# Patient Record
Sex: FEMALE | Race: WHITE | Employment: OTHER | URBAN - METROPOLITAN AREA
[De-identification: names, ages, dates, MRNs, and addresses within clinical notes are randomized per-mention and may not be internally consistent; named-entity substitution may affect disease eponyms.]

---

## 2017-04-25 PROBLEM — D31.40: Status: ACTIVE | Noted: 2017-04-25

## 2017-06-16 ENCOUNTER — APPOINTMENT (OUTPATIENT)
Dept: GENERAL RADIOLOGY | Age: 57
End: 2017-06-16
Attending: EMERGENCY MEDICINE
Payer: COMMERCIAL

## 2017-06-16 ENCOUNTER — TELEPHONE (OUTPATIENT)
Dept: FAMILY MEDICINE CLINIC | Age: 57
End: 2017-06-16

## 2017-06-16 ENCOUNTER — HOSPITAL ENCOUNTER (EMERGENCY)
Age: 57
Discharge: HOME | End: 2017-06-16
Attending: EMERGENCY MEDICINE | Admitting: EMERGENCY MEDICINE
Payer: COMMERCIAL

## 2017-06-16 VITALS
RESPIRATION RATE: 13 BRPM | HEIGHT: 70 IN | OXYGEN SATURATION: 98 % | SYSTOLIC BLOOD PRESSURE: 120 MMHG | TEMPERATURE: 98.5 F | HEART RATE: 71 BPM | DIASTOLIC BLOOD PRESSURE: 73 MMHG | WEIGHT: 165 LBS | BODY MASS INDEX: 23.62 KG/M2

## 2017-06-16 DIAGNOSIS — R00.2 PALPITATIONS: Primary | ICD-10-CM

## 2017-06-16 DIAGNOSIS — R06.09 DOE (DYSPNEA ON EXERTION): ICD-10-CM

## 2017-06-16 LAB
ALBUMIN SERPL BCP-MCNC: 3.9 G/DL (ref 3.5–5)
ALBUMIN/GLOB SERPL: 1.1 {RATIO} (ref 1.1–2.2)
ALP SERPL-CCNC: 92 U/L (ref 45–117)
ALT SERPL-CCNC: 38 U/L (ref 12–78)
ANION GAP BLD CALC-SCNC: 6 MMOL/L (ref 5–15)
AST SERPL W P-5'-P-CCNC: 23 U/L (ref 15–37)
ATRIAL RATE: 83 BPM
ATTENDING PHYSICIAN, CST07: NORMAL
BASOPHILS # BLD AUTO: 0 K/UL (ref 0–0.1)
BASOPHILS # BLD: 1 % (ref 0–1)
BILIRUB SERPL-MCNC: 0.4 MG/DL (ref 0.2–1)
BUN SERPL-MCNC: 15 MG/DL (ref 6–20)
BUN/CREAT SERPL: 16 (ref 12–20)
CALCIUM SERPL-MCNC: 8.8 MG/DL (ref 8.5–10.1)
CALCULATED P AXIS, ECG09: 85 DEGREES
CALCULATED R AXIS, ECG10: 86 DEGREES
CALCULATED T AXIS, ECG11: 65 DEGREES
CHLORIDE SERPL-SCNC: 106 MMOL/L (ref 97–108)
CK SERPL-CCNC: 174 U/L (ref 26–192)
CO2 SERPL-SCNC: 29 MMOL/L (ref 21–32)
CREAT SERPL-MCNC: 0.94 MG/DL (ref 0.55–1.02)
D DIMER PPP FEU-MCNC: 0.23 MG/L FEU (ref 0–0.65)
DIAGNOSIS, 93000: NORMAL
DIAGNOSIS, 93000: NORMAL
DUKE TM SCORE RESULT, CST14: NORMAL
DUKE TREADMILL SCORE, CST13: NORMAL
ECG INTERP BEFORE EX, CST11: NORMAL
ECG INTERP DURING EX, CST12: NORMAL
EOSINOPHIL # BLD: 0.1 K/UL (ref 0–0.4)
EOSINOPHIL NFR BLD: 1 % (ref 0–7)
ERYTHROCYTE [DISTWIDTH] IN BLOOD BY AUTOMATED COUNT: 12.7 % (ref 11.5–14.5)
FUNCTIONAL CAPACITY, CST17: NORMAL
GLOBULIN SER CALC-MCNC: 3.6 G/DL (ref 2–4)
GLUCOSE SERPL-MCNC: 103 MG/DL (ref 65–100)
HCT VFR BLD AUTO: 43.4 % (ref 35–47)
HGB BLD-MCNC: 14 G/DL (ref 11.5–16)
KNOWN CARDIAC CONDITION, CST08: NORMAL
LYMPHOCYTES # BLD AUTO: 29 % (ref 12–49)
LYMPHOCYTES # BLD: 1.4 K/UL (ref 0.8–3.5)
MAGNESIUM SERPL-MCNC: 2.3 MG/DL (ref 1.6–2.4)
MAX. DIASTOLIC BP, CST04: 80 MMHG
MAX. HEART RATE, CST05: 160 BPM
MAX. SYSTOLIC BP, CST03: 170 MMHG
MCH RBC QN AUTO: 29.5 PG (ref 26–34)
MCHC RBC AUTO-ENTMCNC: 32.3 G/DL (ref 30–36.5)
MCV RBC AUTO: 91.6 FL (ref 80–99)
MONOCYTES # BLD: 0.4 K/UL (ref 0–1)
MONOCYTES NFR BLD AUTO: 8 % (ref 5–13)
NEUTS SEG # BLD: 3 K/UL (ref 1.8–8)
NEUTS SEG NFR BLD AUTO: 61 % (ref 32–75)
OVERALL BP RESPONSE TO EXERCISE, CST16: NORMAL
OVERALL HR RESPONSE TO EXERCISE, CST15: NORMAL
P-R INTERVAL, ECG05: 124 MS
PEAK EX METS, CST10: 10.9 METS
PLATELET # BLD AUTO: 212 K/UL (ref 150–400)
POTASSIUM SERPL-SCNC: 3.9 MMOL/L (ref 3.5–5.1)
PROT SERPL-MCNC: 7.5 G/DL (ref 6.4–8.2)
PROTOCOL NAME, CST01: NORMAL
Q-T INTERVAL, ECG07: 370 MS
QRS DURATION, ECG06: 94 MS
QTC CALCULATION (BEZET), ECG08: 434 MS
RBC # BLD AUTO: 4.74 M/UL (ref 3.8–5.2)
SODIUM SERPL-SCNC: 141 MMOL/L (ref 136–145)
TEST INDICATION, CST09: NORMAL
TROPONIN I BLD-MCNC: <0.04 NG/ML (ref 0–0.08)
TROPONIN I SERPL-MCNC: <0.04 NG/ML
TSH SERPL DL<=0.05 MIU/L-ACNC: 1.47 UIU/ML (ref 0.36–3.74)
VENTRICULAR RATE, ECG03: 83 BPM
WBC # BLD AUTO: 4.9 K/UL (ref 3.6–11)

## 2017-06-16 PROCEDURE — 80053 COMPREHEN METABOLIC PANEL: CPT | Performed by: EMERGENCY MEDICINE

## 2017-06-16 PROCEDURE — 93005 ELECTROCARDIOGRAM TRACING: CPT

## 2017-06-16 PROCEDURE — 84443 ASSAY THYROID STIM HORMONE: CPT | Performed by: EMERGENCY MEDICINE

## 2017-06-16 PROCEDURE — 84484 ASSAY OF TROPONIN QUANT: CPT | Performed by: EMERGENCY MEDICINE

## 2017-06-16 PROCEDURE — 71020 XR CHEST PA LAT: CPT

## 2017-06-16 PROCEDURE — 93351 STRESS TTE COMPLETE: CPT

## 2017-06-16 PROCEDURE — 82550 ASSAY OF CK (CPK): CPT | Performed by: EMERGENCY MEDICINE

## 2017-06-16 PROCEDURE — 99285 EMERGENCY DEPT VISIT HI MDM: CPT

## 2017-06-16 PROCEDURE — 85379 FIBRIN DEGRADATION QUANT: CPT | Performed by: EMERGENCY MEDICINE

## 2017-06-16 PROCEDURE — 83735 ASSAY OF MAGNESIUM: CPT | Performed by: EMERGENCY MEDICINE

## 2017-06-16 PROCEDURE — 85025 COMPLETE CBC W/AUTO DIFF WBC: CPT | Performed by: EMERGENCY MEDICINE

## 2017-06-16 PROCEDURE — 36415 COLL VENOUS BLD VENIPUNCTURE: CPT | Performed by: EMERGENCY MEDICINE

## 2017-06-16 NOTE — DISCHARGE INSTRUCTIONS
Palpitations: Care Instructions  Your Care Instructions    Heart palpitations are the uncomfortable sensation that your heart is beating fast or irregularly. You might feel pounding or fluttering in your chest. It might feel like your heart is skipping a beat. Although palpitations may be caused by a heart problem, they also occur because of stress, fatigue, or use of alcohol, caffeine, or nicotine. Many medicines, including diet pills, antihistamines, decongestants, and some herbal products, can cause heart palpitations. Nearly everyone has palpitations from time to time. Depending on your symptoms, your doctor may need to do more tests to try to find the cause of your palpitations. Follow-up care is a key part of your treatment and safety. Be sure to make and go to all appointments, and call your doctor if you are having problems. It's also a good idea to know your test results and keep a list of the medicines you take. How can you care for yourself at home? · Avoid caffeine, nicotine, and excess alcohol. · Do not take illegal drugs, such as methamphetamines and cocaine. · Do not take weight loss or diet medicines unless you talk with your doctor first.  · Get plenty of sleep. · Do not overeat. · If you have palpitations again, take deep breaths and try to relax. This may slow a racing heart. · If you start to feel lightheaded, lie down to avoid injuries that might result if you pass out and fall down. · Keep a record of your palpitations and bring it to your next doctor's appointment. Write down:  ¨ The date and time. ¨ Your pulse. (If your heart is beating fast, it may be hard to count your pulse.)  ¨ What you were doing when the palpitations started. ¨ How long the palpitations lasted. ¨ Any other symptoms. · If an activity causes palpitations, slow down or stop. Talk to your doctor before you do that activity again. · Take your medicines exactly as prescribed.  Call your doctor if you think you are having a problem with your medicine. When should you call for help? Call 911 anytime you think you may need emergency care. For example, call if:  · You passed out (lost consciousness). · You have symptoms of a heart attack. These may include:  ¨ Chest pain or pressure, or a strange feeling in the chest.  ¨ Sweating. ¨ Shortness of breath. ¨ Pain, pressure, or a strange feeling in the back, neck, jaw, or upper belly or in one or both shoulders or arms. ¨ Lightheadedness or sudden weakness. ¨ A fast or irregular heartbeat. After you call 911, the  may tell you to chew 1 adult-strength or 2 to 4 low-dose aspirin. Wait for an ambulance. Do not try to drive yourself. · You have symptoms of a stroke. These may include:  ¨ Sudden numbness, tingling, weakness, or loss of movement in your face, arm, or leg, especially on only one side of your body. ¨ Sudden vision changes. ¨ Sudden trouble speaking. ¨ Sudden confusion or trouble understanding simple statements. ¨ Sudden problems with walking or balance. ¨ A sudden, severe headache that is different from past headaches. Call your doctor now or seek immediate medical care if:  · You have heart palpitations and:  ¨ Are dizzy or lightheaded, or you feel like you may faint. ¨ Have new or increased shortness of breath. Watch closely for changes in your health, and be sure to contact your doctor if:  · You continue to have heart palpitations. Where can you learn more? Go to http://lenny-gage.info/. Enter R508 in the search box to learn more about \"Palpitations: Care Instructions. \"  Current as of: January 27, 2016  Content Version: 11.2  © 2851-9663 [employer]. Care instructions adapted under license by [employer] (which disclaims liability or warranty for this information).  If you have questions about a medical condition or this instruction, always ask your healthcare professional. [employer] disclaims any warranty or liability for your use of this information. We hope that we have addressed all of your medical concerns. The examination and treatment you received in the Emergency Department were for an emergent problem and were not intended as complete care. It is important that you follow up with your healthcare provider(s) for ongoing care. If your symptoms worsen or do not improve as expected, and you are unable to reach your usual health care provider(s), you should return to the Emergency Department. Today's healthcare is undergoing tremendous change, and patient satisfaction surveys are one of the many tools to assess the quality of medical care. You may receive a survey from the [employer] regarding your experience in the Emergency Department. I hope that your experience has been completely positive, particularly the medical care that I provided. As such, please participate in the survey; anything less than excellent does not meet my expectations or intentions. [facility] and [address] [place] [address] participate in nationally recognized quality of care measures. If your blood pressure is greater than 120/80, as reported below, we urge that you seek medical care to address the potential of high blood pressure, commonly known as hypertension. Hypertension can be hereditary or can be caused by certain medical conditions, pain, stress, or \"white coat syndrome. \"       Please make an appointment with your health care provider(s) for follow up of your Emergency Department visit.        VITALS:   Patient Vitals for the past 8 hrs:   Temp Pulse Resp BP SpO2   06/16/17 1115 - 71 13 124/78 95 %   06/16/17 1100 - 70 15 115/68 98 %   06/16/17 1045 - 74 16 108/89 98 %   06/16/17 1030 - 67 18 (!) 85/67 99 %   06/16/17 1015 - 73 12 117/72 98 %   06/16/17 0915 - 81 19 118/70 99 %   06/16/17 0857 98.5 °F (36.9 °C) 80 16 126/75 98 % Thank you for allowing us to provide you with medical care today. We realize that you have many choices for your emergency care needs. Please choose us in the future for any continued health care needs. Amanda Logan, [address] [place], [place] [address]y [address].   Office: [phone]            Recent Results (from the past 24 hour(s))   EKG, 12 LEAD, INITIAL    Collection Time: 06/16/17  9:04 AM   Result Value Ref Range    Ventricular Rate 83 BPM    Atrial Rate 83 BPM    P-R Interval 124 ms    QRS Duration 94 ms    Q-T Interval 370 ms    QTC Calculation (Bezet) 434 ms    Calculated P Axis 85 degrees    Calculated R Axis 86 degrees    Calculated T Axis 65 degrees    Diagnosis       Normal sinus rhythm  When compared with ECG of 28-JAN-2014 10:29,  No significant change was found  Confirmed by Eben Sheppard MD, Cinda Welsh ([id number]) on 6/16/2017 11:38:57 AM     CBC WITH AUTOMATED DIFF    Collection Time: 06/16/17  9:12 AM   Result Value Ref Range    WBC 4.9 3.6 - 11.0 K/uL    RBC 4.74 3.80 - 5.20 M/uL    HGB 14.0 11.5 - 16.0 g/dL    HCT 43.4 35.0 - 47.0 %    MCV 91.6 80.0 - 99.0 FL    MCH 29.5 26.0 - 34.0 PG    MCHC 32.3 30.0 - 36.5 g/dL    RDW 12.7 11.5 - 14.5 %    PLATELET 892 857 - 274 K/uL    NEUTROPHILS 61 32 - 75 %    LYMPHOCYTES 29 12 - 49 %    MONOCYTES 8 5 - 13 %    EOSINOPHILS 1 0 - 7 %    BASOPHILS 1 0 - 1 %    ABS. NEUTROPHILS 3.0 1.8 - 8.0 K/UL    ABS. LYMPHOCYTES 1.4 0.8 - 3.5 K/UL    ABS. MONOCYTES 0.4 0.0 - 1.0 K/UL    ABS. EOSINOPHILS 0.1 0.0 - 0.4 K/UL    ABS.  BASOPHILS 0.0 0.0 - 0.1 K/UL   METABOLIC PANEL, COMPREHENSIVE    Collection Time: 06/16/17  9:12 AM   Result Value Ref Range    Sodium 141 136 - 145 mmol/L    Potassium 3.9 3.5 - 5.1 mmol/L    Chloride 106 97 - 108 mmol/L    CO2 29 21 - 32 mmol/L    Anion gap 6 5 - 15 mmol/L    Glucose 103 (H) 65 - 100 mg/dL    BUN 15 6 - 20 MG/DL    Creatinine 0.94 0.55 - 1.02 MG/DL    BUN/Creatinine ratio 16 12 - 20      GFR est AA >60 >60 ml/min/1.73m2    GFR est non-AA >60 >60 ml/min/1.73m2    Calcium 8.8 8.5 - 10.1 MG/DL    Bilirubin, total 0.4 0.2 - 1.0 MG/DL    ALT (SGPT) 38 12 - 78 U/L    AST (SGOT) 23 15 - 37 U/L    Alk. phosphatase 92 45 - 117 U/L    Protein, total 7.5 6.4 - 8.2 g/dL    Albumin 3.9 3.5 - 5.0 g/dL    Globulin 3.6 2.0 - 4.0 g/dL    A-G Ratio 1.1 1.1 - 2.2     TROPONIN I    Collection Time: 06/16/17  9:12 AM   Result Value Ref Range    Troponin-I, Qt. <0.04 <0.05 ng/mL   CK W/ REFLX CKMB    Collection Time: 06/16/17  9:12 AM   Result Value Ref Range     26 - 192 U/L   TSH 3RD GENERATION    Collection Time: 06/16/17  9:12 AM   Result Value Ref Range    TSH 1.47 0.36 - 3.74 uIU/mL   D DIMER    Collection Time: 06/16/17  9:12 AM   Result Value Ref Range    D-dimer 0.23 0.00 - 0.65 mg/L FEU   MAGNESIUM    Collection Time: 06/16/17  9:12 AM   Result Value Ref Range    Magnesium 2.3 1.6 - 2.4 mg/dL   ECHO TTE STRESS EXERCISE TREADMILL COMP    Collection Time: 06/16/17 11:40 AM   Result Value Ref Range    Diagnosis       NORMAL STRESS ECHO. Confirmed by Eris Yang M.D., Alice Graham ([id number]),  Teena Rousseau   ([id number]) on 6/16/2017 12:16:24 PM      Test indication Chest Discomfort     Functional capacity Normal     ECG Interp. Before Exercise Normal     ECG Interp. During Exercise none     Overall HR response to exercise appropriate     Overall BP response to exercise      Max. Systolic  mmHg    Max. Diastolic BP 80 mmHg    Max. Heart rate 160 BPM    Duke treadmill score      Goldman TM score result      Peak Ex METs 10.9 METS    Protocol name ANITRA                Known cardiac condition      Attending physician Jose Greco MD    POC TROPONIN-I    Collection Time: 06/16/17 12:50 PM   Result Value Ref Range    Troponin-I (POC) <0.04 0.00 - 0.08 ng/mL       Xr Chest Pa Lat    Result Date: 6/16/2017  Exam:  2 view chest Indication: Shortness of breath and rapid heart rate Comparison to 1/28/2014.  PA and lateral views demonstrate normal heart size. There is no acute process in the lung fields. The osseous structures are unremarkable.      Impression: No acute process or change compared to the prior exam.

## 2017-06-16 NOTE — TELEPHONE ENCOUNTER
----- Message from Eve Rasmussen sent at 6/16/2017  7:28 AM EDT -----  Regarding: Dr. salazar/ telephone  Contact: [phone]  Pt is requesting a call back regarding rapid heart beat, shortness of breath and heat palpitations. (no pain) Pt needs appt ASAP. Advised of on call physician and urgent care.  Pt's best contact number is [phone]

## 2017-06-16 NOTE — ED PROVIDER NOTES
HPI Comments: 56 y.o. female with no significant past medical history who presents from home by private vehicle with chief complaint of palpitations and SOB. Pt reports that yesterday she was mowing the lawn with a push mower yesterday and began to have palpitations that felt like her heart was racing and had associated SOB. She reports that the subsided in the evening, but then this morning while walking her dog she had a similar episode of palpitations and SOB that prompted her visit to the ED this morning. She reports that she had similar Sx 4 years ago that she was evaluated for in the ED and had subsequent stress test both with normal evaluations. Pt denies CP, nausea, vomiting, abdominal pain, fever, chills, cough. There are no other acute medical concerns at this time. PCP: Ned Cortez MD  Note written by edilson Cuenca, as dictated by Helen Rudolph MD 9:42 AM        The history is provided by the patient. Past Medical History:   Diagnosis Date    Advance directive discussed with patient 09/13/2016    has info    Endometriosis     Murmur     Tongue lesion 4/27/16    area removed per note from [facility]    Vertigo     Vitamin D deficiency     Vocal cord dysfunction     due to significant allergies       Past Surgical History:   Procedure Laterality Date    ABDOMEN SURGERY PROC UNLISTED  1989    cyst removed with right ovary    HX ABDOMINAL LAPAROSCOPY      x 2    HX COLONOSCOPY  2007    HX COLONOSCOPY  7/26/16    10 yr repeat Annamarie Longs    HX HYSTERECTOMY  2004    HX OTHER SURGICAL  4/27/16 path pend    lesion on left lateral border of tongue    HX OTHER SURGICAL      [facility] face-2005(in [facility])    HX RETINAL DETACHMENT REPAIR      HX RIGHT SALPINGO-OOPHORECTOMY  1980's    secondary to mass         History reviewed. No pertinent family history.     Social History     Social History    Marital status: SINGLE     Spouse name: N/A    Number of children: N/A    Years of education: N/A     Occupational History    Not on file. Social History Main Topics    Smoking status: Never Smoker    Smokeless tobacco: Not on file    Alcohol use No    Drug use: No    Sexual activity: Not on file     Other Topics Concern    Not on file     Social History Narrative         ALLERGIES: Morphine and Penicillins    Review of Systems   Constitutional: Negative for chills and fever. HENT: Negative for rhinorrhea and sore throat. Respiratory: Positive for shortness of breath. Negative for cough. Cardiovascular: Positive for palpitations. Negative for chest pain. Gastrointestinal: Negative for abdominal pain, diarrhea, nausea and vomiting. Genitourinary: Negative for dysuria and urgency. Musculoskeletal: Negative for arthralgias and back pain. Skin: Negative for rash. Neurological: Negative for dizziness, weakness and light-headedness. All other systems reviewed and are negative.       Vitals:    06/16/17 0857   BP: 126/75   Pulse: 80   Resp: 16   Temp: 98.5 °F (36.9 °C)   SpO2: 98%   Weight: 74.8 kg (165 lb)   Height: 5' 10\" (1.778 m)            Physical Exam   Const:  No acute distress, well developed, well nourished  Head:  Atraumatic, normocephalic  Eyes:  PERRL, conjunctiva normal, no scleral icterus  Neck:  Supple, trachea midline  Cardiovascular:  RRR, no murmurs, no gallops, no rubs  Resp:  No resp distress, no increased work of breathing, no wheezes, no rhonchi, no rales,  Abd:  Soft, non-tender, non-distended, no rebound, no guarding, no CVA tenderness  :  Deferred  MSK:  No pedal edema, normal ROM  Neuro:  Alert and oriented x3, no cranial nerve defect  Skin:  Warm, dry, intact  Psych: normal mood and affect, behavior is normal, judgement and thought content is normal  Note written by edilson Jack, as dictated by Jose Mon MD 10:16 AM      MDM  Number of Diagnoses or Management Options  GILLIS (dyspnea on exertion):   Palpitations:      Amount and/or Complexity of Data Reviewed  Clinical lab tests: ordered and reviewed  Tests in the radiology section of CPT®: ordered and reviewed  Review and summarize past medical records: yes    Patient Progress  Patient progress: stable    ED Course     Pt. Presents to the ER with palpitations and GILLIS. She is well appearing in the ER. Negative cardiac enzymes. No pneumonia on xray. Negative d-dimer (pt. Is low risk for PE). Pt. Seen by cardiology and had a negative stress test.  Pt. To f/u with her PCP and cardiology or return to the ER with worsening sx. Procedures      EKG interpretation: (Preliminary)  Rhythm: normal sinus rhythm; and regular .  Rate (approx.): 83; Axis: normal; P wave: normal; QRS interval: normal ; ST/T wave: normal; Other findings: normal.

## 2017-06-16 NOTE — CONSULTS
CARDIOLOGY CONSULT NOTE           [address] [facility], [place] [address]   [phone] fax [phone]    Name: Sahra Gautam  [id number]  6/19/2017 11:43 AM     Assessment/Plan:  1. Chest pain:  Troponin <0.04.  12 lead EKG:  NSR with no ST/T wave changes. No chest pain now. D-Dimer NL, CXR NL. TSH NL  -Stress echo this a.m.      2. Palpitations: Lytes WNL. TSH WNL No palpitations currently. 3. Hx of ?seasonal allergies per pt report:  F/u with PCP- if stress test negative, seasonal allergies may be contributing to symptoms. 4. Hx Vitamin D deficiency              Admit Date: 6/16/2017     Admit Diagnosis:   Primary Care Physician:German Angulo MD     Attending Provider: No att. providers found  Primary Cardiologist: Dr. Adelfo Chang MD  Consulting Cardiologist: Dr. Adelfo Chang MD    REASON FOR CONSULT: chest pain, palpitations  Requesting Physician: Dr. Sanket Streeter MD    Subjective: Ms. Lori Quijano is a 56 y.o. female with PMH of heart palpitations (last evaluated in 2013 and had NL stress test at that time), vit D deficiency, . She presented today to ER with chief complaint of palpitations and SOB. Pt reports that yesterday she was mowing the lawn and began to have palpitations that felt like her heart was racing and had associated SOB. She reports that the subsided in the evening, but then this morning while walking her dog she had a similar episode of palpitations and SOB that prompted her visit to the ED this morning. States she felt that her heart was \"pounding\" and the chest pain was more like bandlike pressure around chest, had difficulty taking a breath. Laying back seemed to improve the symptoms. . No c/o dizziness. Currently pt denies any chest pain, SOB, palpitations although she did experience palpitations earlier in ER when she ambulated to bathroom.   No recent illness but does report problems with seasonal allergies recently and inability to take Claritin as it caused palpitations. No nausea or recent hx of reflux, indigestion. She was evaluated 4 years ago for palpitations and dizziness, had NL nuclear stress test and advised to avoid caffeine, increase carbs. Pt states she has increased her carbohydrate intake recently. Review of Symptoms:  Pertinent items are noted in HPI./subjective    Previous treatment/evaluation includes nuclear stress test .  Cardiac risk factors: post-menopausal.    Past Medical History:   Diagnosis Date    Advance directive discussed with patient 09/13/2016    has info    Endometriosis     Murmur     Tongue lesion 4/27/16    area removed [facility] note from [facility]    Vertigo     Vitamin D deficiency     Vocal cord dysfunction     due to significant allergies     Past Surgical History:   Procedure Laterality Date    ABDOMEN SURGERY PROC UNLISTED  1989    cyst removed with right ovary    HX ABDOMINAL LAPAROSCOPY      x 2    HX COLONOSCOPY  2007    HX COLONOSCOPY  7/26/16    10 yr repeat Leonila High    HX HYSTERECTOMY  2004    HX OTHER SURGICAL  4/27/16 path pend    lesion on left lateral border of tongue    HX OTHER SURGICAL      bas[facility] cell face-2005(in [facility])    HX RETINAL DETACHMENT REPAIR      HX RIGHT SALPINGO-OOPHORECTOMY  1980's    secondary to mass     No current facility-administered medications for this encounter. Current Outpatient Prescriptions   Medication Sig    diphenhydrAMINE (BENADRYL) 25 mg capsule Take 12.5 mg by mouth as needed. Allergies   Allergen Reactions    Morphine Rash     Leg rash    Penicillins Hives      History reviewed. No pertinent family history.    Social History     Social History    Marital status: SINGLE     Spouse name: N/A    Number of children: N/A    Years of education: N/A     Social History Main Topics    Smoking status: Never Smoker    Smokeless tobacco: None    Alcohol use No    Drug use: No    Sexual activity: Not Asked     Other Topics Concern    None Social History Narrative   Family history:  father :HTN and CVA at age 80, mother passed from cancer at age 71,     Objective:      Physical Exam  Vitals:    06/16/17 1045 06/16/17 1100 06/16/17 1115 06/16/17 1300   BP: 108/89 115/68 124/78 120/73   Pulse: 74 70 71    Resp: 16 15 13    Temp:       SpO2: 98% 98% 95% 98%   Weight:       Height:           General:   Alert, cooperative, no distress, appears stated age. Eyes:      Conjunctivae/corneas clear. Ears:     Normal external ear canals both ears. Nose:  Nares normal. No drainage    Mouth/Throat:  Moist mucous membranes. Dentition normal.    Neck:  Supple, symmetrical, trachea midline, no carotid bruit and no JVD. Back:    Symmetric, no curvature. ROM normal.    Lungs:    Clear to auscultation bilaterally. No use of accessory muscles noted. Heart:   Regular rate and rhythm, S1, S2 normal, no murmur, click, rub or  gallop. Abdomen:    Soft, non-tender. Bowel sounds normal. No masses,  No  organomegaly. Extremities:  Extremities normal, atraumatic, no cyanosis or edema. Vascular:  2+ and symmetric all extremities. Skin:  Skin color normal. No rashes or lesions   Lymph nodes:  Not assessed   Neurologic:  CNII-XII grossly intact. CRISOSTOMO. Telemetry: normal sinus rhythm    ECG: NSR, no ST/T wave changee    Data Review:     No results for input(s): CPK, TROIQ in the last 72 hours. No lab exists for component: CKQMB, CPKMB, BMPP  No results for input(s): NA, K, CL, CO2, BUN, CREA, GLU, PHOS, CA in the last 72 hours. No results for input(s): WBC, HGB, HCT, PLT, HGBEXT, HCTEXT, PLTEXT, HGBEXT, HCTEXT, PLTEXT in the last 72 hours. No results for input(s): PTP, INR, GPT, SGOT, AP in the last 72 hours. No lab exists for component: PTTP, INREXT, INREXT  No results for input(s): CHOL, LDLC in the last 72 hours. No lab exists for component: TGL, HDLC,  HBA1C  No results for input(s): CRP, TSH, TSHEXT, TSHEXT in the last 72 hours.     No lab exists for component: ESR    Thank you very much for this referral. I appreciate the opportunity to participate in this patient's care. I will follow along with above stated plan.     Brandin Kee MD  [facility] [address] [place] [address], [address] [place] [address],[address]  [place], [facility]  [phone]    Osiel Schmitz MD

## 2017-06-16 NOTE — ED NOTES
Pt is refusing to be placed back on monitor, stating she is hungry and would like to know what the plan is. Dr Denise Merlos made aware.

## 2017-06-16 NOTE — TELEPHONE ENCOUNTER
I spoke to patient and advised to the ED this am for evaluation. Advised to have someone transport her-she is not to drive herself. Patient agrees with this plan.

## 2017-06-19 ENCOUNTER — TELEPHONE (OUTPATIENT)
Dept: FAMILY MEDICINE CLINIC | Age: 57
End: 2017-06-19

## 2017-06-19 NOTE — TELEPHONE ENCOUNTER
Patient is requesting a return call to discuss a medication she is taking since her visit to the er. Her contact # is [phone].

## 2017-06-19 NOTE — TELEPHONE ENCOUNTER
I spoke to patient and she was seen in the ED by her Cardio (Dr Aminata Bae supervised her stress test) and she doesn't need follow up with her.

## 2017-06-19 NOTE — TELEPHONE ENCOUNTER
----- Message from Jitendra Garcia sent at 6/19/2017  8:16 AM EDT -----  Regarding: Reads/Telephone  Pt called requesting a appt for today 06/19/2017 . Pt has been experiencing heart palpitation and some shortness of breath. Pt visit the ER at [facility] and ER did not find any heart issues. Pt best contact number is the cell [phone].

## 2017-06-19 NOTE — TELEPHONE ENCOUNTER
Spoke to ID verified patient and she had a negative cardiac work up. She was seen by Dr Atiya Nash in the hospital during her stress test and since this was negative she was told did not have to follow up with Dr Atiya Nash in her office. Patient read info on line about silent reflux and several of her sx fit the bill. She has been taking Pepcid with good benefit. Patient wonders if needs to see GI. Advised to keep follow up appt here this week and she can discuss with MD face to face. She does have a pinpoint spot of soreness on her chest at the area her bra band would touch-no rash but can not tolerate pressure to this spot. Advised Dr Luis Marcelo will check out at her appt.

## 2017-06-21 ENCOUNTER — OFFICE VISIT (OUTPATIENT)
Dept: FAMILY MEDICINE CLINIC | Age: 57
End: 2017-06-21

## 2017-06-21 VITALS
WEIGHT: 168.3 LBS | DIASTOLIC BLOOD PRESSURE: 79 MMHG | HEART RATE: 78 BPM | BODY MASS INDEX: 24.09 KG/M2 | HEIGHT: 70 IN | TEMPERATURE: 99.1 F | SYSTOLIC BLOOD PRESSURE: 101 MMHG | RESPIRATION RATE: 16 BRPM | OXYGEN SATURATION: 98 %

## 2017-06-21 DIAGNOSIS — K21.9 GASTROESOPHAGEAL REFLUX DISEASE, ESOPHAGITIS PRESENCE NOT SPECIFIED: Primary | ICD-10-CM

## 2017-06-21 RX ORDER — FAMOTIDINE 20 MG/1
20 TABLET, FILM COATED ORAL
COMMUNITY
End: 2019-03-28

## 2017-06-21 NOTE — PROGRESS NOTES
Chief Complaint   Patient presents with   [facility] Follow Up     6/16/17 [facility]. Patient was tested for heart problems and found none. Patient thinks that she has silent reflux and started Pepcid on Saturday twice a day and that has helped some of the symptoms. Painful to have anything tight on her abdomen. 1. Have you been to the ER, urgent care clinic since your last visit? Hospitalized since your last visit? Yes When: 6/16/17 Where: [facility] Reason for visit: SOB and palpitations. 2. Have you seen or consulted any other health care providers outside of the [address] [place] [address] since your last visit? Include any pap smears or colon screening. No       The patient was counseled on the dangers of tobacco use, and Patient is a non smoker. Reviewed strategies to maximize success, including Continue not to smoke. I have reviewed Health Maintenance with the patient and updated. Advance Care Planning information reviewed and given to the patient at previous visit.

## 2017-06-21 NOTE — PROGRESS NOTES
Subxiphoid pain with palpation. Thinks sxs going on for years. Coughing spells if bends over sink for years. Feels like vocal cords go into spasm causing cough, also sneezing. Knows has allergies from prior testing. Last Thursday mowing lawn chest felt tight. Next AM felt like rapid palp but pulse was in 70's, felt SOB. Also had diarrhea that AM.  Went to ER b/o sxs. Card told issues allergy related. Sat AM felt heart pounding, SOB while eating breakfast.  Googled and came up with \"silent reflux\". Started on Pepcid BID with benefit. Changed diet. Told in past had vocal cord dysfcn but told no by ENT. Visit Vitals    /79 (BP 1 Location: Left arm, BP Patient Position: Sitting)    Pulse 78    Temp 99.1 °F (37.3 °C) (Oral)    Resp 16    Ht 5' 10\" (1.778 m)    Wt 168 lb 4.8 oz (76.3 kg)    SpO2 98%    BMI 24.15 kg/m2       Patient alert and cooperative. Reviewed above. Assessment:  Symptoms likely related to GERD as benefit from H2 blocker. Plan:  1. Because of chronicity will set up GI referral for upper endoscopy to be sure no evidence of Tan's. 2. Advised to use liquid Tums if breakthrough symptoms on Pepcid. 3. Recommended to check pulse if episodes of recurrent heart pounding. 4. Follow otherwise here prn.

## 2017-06-21 NOTE — MR AVS SNAPSHOT
Visit Information Date & Time Provider Department Dept. Phone Encounter #  
 6/21/2017 11:00 AM Héctor Sanchez MD [facility] [phone] [id number] Follow-up Instructions Return if symptoms worsen or fail to improve. Upcoming Health Maintenance Date Due DTaP/Tdap/Td series (1 - Tdap) 1/27/1983 BREAST CANCER SCRN MAMMOGRAM 2/4/2016 INFLUENZA AGE 9 TO ADULT 8/1/2017 COLONOSCOPY 7/26/2026 Allergies as of 6/21/2017  Review Complete On: 6/21/2017 By: Gage Chisholm RN Severity Noted Reaction Type Reactions Morphine  06/27/2016   Side Effect Rash Leg rash Penicillins  09/03/2013   Side Effect Hives Current Immunizations  Reviewed on 6/21/2017 No immunizations on file. Reviewed by Gage Chisholm RN on 6/21/2017 at 11:10 AM  
You Were Diagnosed With   
  
 Codes Comments Gastroesophageal reflux disease, esophagitis presence not specified    -  Primary ICD-10-CM: K21.9 ICD-9-CM: 530.81 Vitals BP Pulse Temp Resp Height(growth percentile) Weight(growth percentile) 101/79 (BP 1 Location: Left arm, BP Patient Position: Sitting) 78 99.1 °F (37.3 °C) (Oral) 16 5' 10\" (1.778 m) 168 lb 4.8 oz (76.3 kg) SpO2 BMI OB Status Smoking Status 98% 24.15 kg/m2 Hysterectomy Never Smoker Vitals History BMI and BSA Data Body Mass Index Body Surface Area  
 24.15 kg/m 2 1.94 m 2 Preferred Pharmacy Pharmacy Name Phone [facility] [phone] Your Updated Medication List  
  
   
This list is accurate as of: 6/21/17 11:34 AM.  Always use your most recent med list.  
  
  
  
  
 diphenhydrAMINE 25 mg capsule Commonly known as:  BENADRYL Take 12.5 mg by mouth as needed. PEPCID 20 mg tablet Generic drug:  famotidine Take 20 mg by mouth two (2) times a day. We Performed the Following REFERRAL TO GASTROENTEROLOGY [XAR55 Custom] Comments:  
 Please evaluate patient for chronic reflux. Follow-up Instructions Return if symptoms worsen or fail to improve. Referral Information Referral ID Referred By Referred To  
  
 [id number] Leti BELLA MD   
   [address] [place] [address] [address] Romie Lanza Phone: [phone] Fax: [phone] Visits Status Start Date End Date 1 New Request 6/21/17 6/21/18 If your referral has a status of pending review or denied, additional information will be sent to support the outcome of this decision. Introducing hospitals & HEALTH SERVICES! Dear Prachi Carolina: Thank you for requesting a whoactually account. Our records indicate that you already have an active whoactually account. You can access your account anytime at [email or web address] Did you know that you can access your hospital and ER discharge instructions at any time in whoactually? You can also review all of your test results from your hospital stay or ER visit. Additional Information If you have questions, please visit the Frequently Asked Questions section of the whoactually website at [email or web address] Remember, whoactually is NOT to be used for urgent needs. For medical emergencies, dial 911. Now available from your iPhone and Android! Please provide this summary of care documentation to your next provider. Your primary care clinician is listed as [id number] If you have any questions after today's visit, please call [phone].

## 2017-09-30 NOTE — ED TRIAGE NOTES
Pt reports having rapid HR that began yesterday while mowing the grass. Pt states she felt like she could not catch her breath and her bra felt tight around her chest.  Pt denies chest pain. good

## 2018-10-04 ENCOUNTER — OFFICE VISIT (OUTPATIENT)
Dept: FAMILY MEDICINE CLINIC | Age: 58
End: 2018-10-04

## 2018-10-04 VITALS
BODY MASS INDEX: 25.21 KG/M2 | HEART RATE: 75 BPM | SYSTOLIC BLOOD PRESSURE: 116 MMHG | TEMPERATURE: 97.7 F | WEIGHT: 176.1 LBS | DIASTOLIC BLOOD PRESSURE: 78 MMHG | HEIGHT: 70 IN | OXYGEN SATURATION: 97 % | RESPIRATION RATE: 18 BRPM

## 2018-10-04 DIAGNOSIS — Z00.00 WELL ADULT HEALTH CHECK: Primary | ICD-10-CM

## 2018-10-04 DIAGNOSIS — Z00.00 LABORATORY EXAM ORDERED AS PART OF ROUTINE GENERAL MEDICAL EXAMINATION: ICD-10-CM

## 2018-10-04 DIAGNOSIS — Z12.31 BREAST CANCER SCREENING BY MAMMOGRAM: ICD-10-CM

## 2018-10-04 DIAGNOSIS — E55.9 VITAMIN D DEFICIENCY: ICD-10-CM

## 2018-10-04 RX ORDER — CLINDAMYCIN PHOSPHATE 10 MG/ML
SOLUTION TOPICAL
Refills: 2 | COMMUNITY
Start: 2018-10-01 | End: 2019-02-04

## 2018-10-04 NOTE — MR AVS SNAPSHOT
[facility] 
 
 
 [address] [facility] [place] [place] 
[address] [place] [address] 
[phone] Patient: Aaliyah Christina MRN: [id number] :1960 Visit Information Date & Time Provider Department Dept. Phone Encounter #  
 10/4/2018  4:00 PM Kelsi May MD [facility] [phone] [id number] Follow-up Instructions Return if symptoms worsen or fail to improve. Upcoming Health Maintenance Date Due  
 BREAST CANCER SCRN MAMMOGRAM 2016 DTaP/Tdap/Td series (1 - Tdap) 3/31/2019* Shingrix Vaccine Age 50> (1 of 2) 3/31/2019* Influenza Age 5 to Adult 3/31/2019* COLONOSCOPY 2026 *Topic was postponed. The date shown is not the original due date. Allergies as of 10/4/2018  Review Complete On: 10/4/2018 By: Kelsi May MD  
  
 Severity Noted Reaction Type Reactions Morphine  2016   Side Effect Rash Leg rash Penicillins  2013   Side Effect Hives Current Immunizations  Reviewed on 2017 No immunizations on file. Not reviewed this visit You Were Diagnosed With   
  
 Codes Comments Well adult health check    -  Primary ICD-10-CM: Z00.00 ICD-9-CM: V70.0 Breast cancer screening by mammogram     ICD-10-CM: Z12.31 
ICD-9-CM: V76.12 Vitamin D deficiency     ICD-10-CM: E55.9 ICD-9-CM: 268.9 Laboratory exam ordered as part of routine general medical examination     ICD-10-CM: Z00.00 ICD-9-CM: V72.62 Vitals BP Pulse Temp Resp Height(growth percentile) Weight(growth percentile) 116/78 (BP 1 Location: Right arm, BP Patient Position: Sitting) 75 97.7 °F (36.5 °C) (Oral) 18 5' 9.75\" (1.772 m) 176 lb 1.6 oz (79.9 kg) SpO2 BMI OB Status Smoking Status 97% 25.45 kg/m2 Hysterectomy Never Smoker BMI and BSA Data Body Mass Index Body Surface Area  
 25.45 kg/m 2 1.98 m 2 Your Updated Medication List  
  
   
 This list is accurate as of 10/4/18  4:45 PM.  Always use your most recent med list.  
  
  
  
  
 clindamycin phosphate 1 % Swab  
USE 1 SWAB TO AFFECTED AREA 3 TIMES A DAY AS DIRECTED PEPCID 20 mg tablet Generic drug:  famotidine Take 20 mg by mouth two (2) times a day. We Performed the Following CBC WITH AUTOMATED DIFF [74127 CPT(R)] LIPID PANEL [74975 CPT(R)] METABOLIC PANEL, COMPREHENSIVE [11142 CPT(R)] TSH 3RD GENERATION [67924 CPT(R)] URINALYSIS W/ RFLX MICROSCOPIC [94232 CPT(R)] VITAMIN D, 25 HYDROXY [id number] CPT(R)] Follow-up Instructions Return if symptoms worsen or fail to improve. To-Do List   
 12/04/2018 Imaging:  SHE MAMMO BI SCREENING INCL CAD Introducing [facility] [place] [facility]! Dear Estefania Bangura: Thank you for requesting a "[employer] account. Our records indicate that you already have an active "[employer] account. You can access your account anytime at [email or web address] Did you know that you can access your hospital and ER discharge instructions at any time in "[employer]? You can also review all of your test results from your hospital stay or ER visit. Additional Information If you have questions, please visit the Frequently Asked Questions section of the "[employer] website at [email or web address] Remember, "[employer] is NOT to be used for urgent needs. For medical emergencies, dial 911. Now available from your iPhone and Android! Please provide this summary of care documentation to your next provider. Your primary care clinician is listed as [id number]. If you have any questions after today's visit, please call [phone].

## 2018-10-04 NOTE — PROGRESS NOTES
Breana Burden  Identified pt with two pt identifiers(name and ). Chief Complaint Patient presents with  
 Other  
  referral for mammogram/  
 
 
1. Have you been to the ER, urgent care clinic since your last visit? no Hospitalized since your last visit? no 
 
2. Have you seen or consulted any other health care providers outside of the [address] [place] [address] since your last visit? Include any pap smears or colon screening. no 
 
 
Advance Care Planning In the event something were to happen to you and you were unable to speak on your behalf, do you have an Advance Directive/ Living Will in place stating your wishes? no If yes, do we have a copy on file? no 
 
If no, would you like information? yes Medication reconciliation up to date and corrected with patient at this time. Today's provider has been notified of reason for visit, vitals and flowsheets obtained on patients. Reviewed record in preparation for visit, huddled with provider and have obtained necessary documentation. Health Maintenance Due Topic  BREAST CANCER SCRN MAMMOGRAM   
 
 
Wt Readings from Last 3 Encounters:  
10/04/18 176 lb 1.6 oz (79.9 kg) 17 168 lb 4.8 oz (76.3 kg) 17 165 lb (74.8 kg) Temp Readings from Last 3 Encounters:  
10/04/18 97.7 °F (36.5 °C) (Oral) 17 99.1 °F (37.3 °C) (Oral) 17 98.5 °F (36.9 °C) BP Readings from Last 3 Encounters:  
10/04/18 116/78  
17 101/79  
17 120/73 Pulse Readings from Last 3 Encounters:  
10/04/18 75  
17 78  
17 71 Vitals:  
 10/04/18 1619 BP: 116/78 Pulse: 75 Resp: 18 Temp: 97.7 °F (36.5 °C) TempSrc: Oral  
SpO2: 97% Weight: 176 lb 1.6 oz (79.9 kg) Height: 5' 9.75\" (1.772 m) PainSc:   0 - No pain Learning Assessment: 
:  
 
Learning Assessment 2016 PRIMARY LEARNER Patient PRIMARY LANGUAGE ENGLISH  
LEARNER PREFERENCE PRIMARY OTHER (COMMENT) ANSWERED BY patient RELATIONSHIP SELF Depression Screening: 
:  
 
PHQ over the last two weeks 10/4/2018 Little interest or pleasure in doing things Not at all Feeling down, depressed, irritable, or hopeless Not at all Total Score PHQ 2 0 Fall Risk Assessment: 
:  
 
No flowsheet data found. Abuse Screening: 
:  
 
Abuse Screening Questionnaire 10/4/2018 Do you ever feel afraid of your partner? Mitesh Distance Are you in a relationship with someone who physically or mentally threatens you? Mitesh Distance Is it safe for you to go home? Y  
 
 
ADL Screening: 
:  
 
No flowsheet data found. Patient declines influenza, and tdap at this time. Medication reconciliation up to date and corrected with patient at this time.

## 2018-10-04 NOTE — PROGRESS NOTES
Addicted to carbs. Lives alone. Wants to weigh 155. Declines having CHP. Wants to do mamm, annual labs. Declines immun. Visit Vitals  /78 (BP 1 Location: Right arm, BP Patient Position: Sitting)  Pulse 75  Temp 97.7 °F (36.5 °C) (Oral)  Resp 18  Ht 5' 9.75\" (1.772 m)  Wt 176 lb 1.6 oz (79.9 kg)  SpO2 97%  BMI 25.45 kg/m2 Patient alert and cooperative. Reviewed above. Assessment: 1. Here for annual health check. Plan: 1. Ordered annual labs to do elsewhere. 2. Ordered mammogram. 
3. Follow up here prn. 
4. Discussed routine immunizations.

## 2018-10-12 ENCOUNTER — HOSPITAL ENCOUNTER (OUTPATIENT)
Dept: MAMMOGRAPHY | Age: 58
Discharge: HOME | End: 2018-10-12
Attending: FAMILY MEDICINE
Payer: COMMERCIAL

## 2018-10-12 DIAGNOSIS — Z12.31 BREAST CANCER SCREENING BY MAMMOGRAM: ICD-10-CM

## 2018-10-12 PROCEDURE — 77067 SCR MAMMO BI INCL CAD: CPT

## 2018-10-23 ENCOUNTER — HOSPITAL ENCOUNTER (OUTPATIENT)
Dept: MAMMOGRAPHY | Age: 58
Discharge: HOME | End: 2018-10-23
Attending: FAMILY MEDICINE
Payer: COMMERCIAL

## 2018-10-23 DIAGNOSIS — R92.8 ABNORMAL MAMMOGRAM OF LEFT BREAST: ICD-10-CM

## 2018-10-23 PROCEDURE — 76642 ULTRASOUND BREAST LIMITED: CPT

## 2018-10-23 PROCEDURE — 77065 DX MAMMO INCL CAD UNI: CPT

## 2018-10-31 NOTE — PROGRESS NOTES
Verified two paint identifiers, name and . Patient stated that she had additional mammogram views done on 10/23/2018.

## 2018-12-13 ENCOUNTER — OFFICE VISIT (OUTPATIENT)
Dept: FAMILY MEDICINE CLINIC | Age: 58
End: 2018-12-13

## 2018-12-13 VITALS
BODY MASS INDEX: 25.43 KG/M2 | HEIGHT: 70 IN | RESPIRATION RATE: 16 BRPM | DIASTOLIC BLOOD PRESSURE: 73 MMHG | WEIGHT: 177.6 LBS | TEMPERATURE: 98.2 F | OXYGEN SATURATION: 100 % | SYSTOLIC BLOOD PRESSURE: 113 MMHG | HEART RATE: 72 BPM

## 2018-12-13 DIAGNOSIS — M25.511 ACUTE PAIN OF RIGHT SHOULDER: Primary | ICD-10-CM

## 2018-12-13 DIAGNOSIS — M79.671 RIGHT FOOT PAIN: ICD-10-CM

## 2018-12-13 RX ORDER — DIPHENHYDRAMINE HCL 25 MG
25 CAPSULE ORAL
COMMUNITY
End: 2019-02-04

## 2018-12-13 RX ORDER — MOMETASONE FUROATE 50 UG/1
2 SPRAY, METERED NASAL
COMMUNITY
End: 2019-12-17

## 2018-12-13 RX ORDER — LORATADINE 10 MG/1
10 TABLET ORAL
COMMUNITY

## 2018-12-13 NOTE — PROGRESS NOTES
Breana Burden  Identified pt with two pt identifiers(name and ). Chief Complaint   Patient presents with    Shoulder Pain     right    Foot Pain     right       1. Have you been to the ER, urgent care clinic since your last visit? Hospitalized since your last visit? No    2. Have you seen or consulted any other health care providers outside of the [address] [place] [address] since your last visit? Include any pap smears or colon screening. No    In the event something were to happen to you and you were unable to speak on your behalf, do you have an Advance Directive/ Living Will in place stating your wishes? NO    If yes, do we have a copy on file NO    If no, would you like information:            Would you like to sign up for MyChart today, if you have not already done so? Patient has a mychart  If not, would you like information on MyChart, and how to sign up at a later time? No      Medication reconciliation up to date and corrected with patient at this time. Today's provider has been notified of reason for visit, vitals and flowsheets obtained on patients. Reviewed record in preparation for visit, huddled with provider and have obtained necessary documentation. There are no preventive care reminders to display for this patient.     Wt Readings from Last 3 Encounters:   10/04/18 176 lb 1.6 oz (79.9 kg)   17 168 lb 4.8 oz (76.3 kg)   17 165 lb (74.8 kg)     Temp Readings from Last 3 Encounters:   10/04/18 97.7 °F (36.5 °C) (Oral)   17 99.1 °F (37.3 °C) (Oral)   17 98.5 °F (36.9 °C)     BP Readings from Last 3 Encounters:   10/04/18 116/78   17 101/79   17 120/73     Pulse Readings from Last 3 Encounters:   10/04/18 75   17 78   17 71     Vitals:    18 1313   BP: 113/73   Pulse: 72   Resp: 16   Temp: 98.2 °F (36.8 °C)   TempSrc: Oral   SpO2: 100%   Weight: 177 lb 9.6 oz (80.6 kg)   Height: 5' 9.69\" (1.77 m)   PainSc:   3   PainLoc: Generalized         Learning Assessment:  :     Learning Assessment 6/27/2016   PRIMARY LEARNER Patient   PRIMARY LANGUAGE ENGLISH   LEARNER PREFERENCE PRIMARY OTHER (COMMENT)   ANSWERED BY patient   RELATIONSHIP SELF       Depression Screening:  :     PHQ over the last two weeks 10/4/2018   Little interest or pleasure in doing things Not at all   Feeling down, depressed, irritable, or hopeless Not at all   Total Score PHQ 2 0       Fall Risk Assessment:  :     Fall Risk Assessment, last 12 mths 12/13/2018   Able to walk? Yes   Fall in past 12 months? No       Abuse Screening:  :     Abuse Screening Questionnaire 10/4/2018   Do you ever feel afraid of your partner? N   Are you in a relationship with someone who physically or mentally threatens you? N   Is it safe for you to go home?  Y       ADL Screening:  :     ADL Assessment 12/13/2018   Feeding yourself No Help Needed   Getting from bed to chair No Help Needed   Getting dressed No Help Needed   Bathing or showering No Help Needed   Walk across the room (includes cane/walker) No Help Needed   Using the telphone No Help Needed   Taking your medications No Help Needed   Preparing meals No Help Needed   Managing money (expenses/bills) No Help Needed   Moderately strenuous housework (laundry) No Help Needed   Shopping for personal items (toiletries/medicines) No Help Needed   Shopping for groceries No Help Needed   Driving No Help Needed   Climbing a flight of stairs No Help Needed   Getting to places beyond walking distances No Help Needed

## 2018-12-13 NOTE — PROGRESS NOTES
Right shoulder sxs for over a year. Has been to chiro. Never been to ortho. Had 2 episodes of sharp pain right post thoracic area triggered by reaching over head. Tried different car seat positions. Worse in past month related to more long distance driving. Right foot sxs began month ago. No recalled injury. 3rd toe occas numbness. Gets right arm numbness if laying on back holding book down to 5th digit. Father passed last week. Visit Vitals  /73 (BP 1 Location: Left arm, BP Patient Position: Sitting)   Pulse 72   Temp 98.2 °F (36.8 °C) (Oral)   Resp 16   Ht 5' 9.69\" (1.77 m)   Wt 177 lb 9.6 oz (80.6 kg)   SpO2 100%   BMI 25.71 kg/m²     Patient alert and cooperative. Right shoulder with guarded motion. Unable to put arm behind head. Unable to get arm to 180 degrees. Unable to take arm behind the back. Guarded horizontal arm across the chest.  Has palpation tenderness and spasticity of the thoracic paraspinals around the shoulder musculature. Right foot with no swelling, warmth, erythema appreciated. Has moderate tenderness overlying the mid foot dorsum. No symptoms with compression of the metatarsal heads. Assessment:  1. Right shoulder pain, decreased motion. Set up ortho referral for further evaluation. May eventually need PT for the tight musculature. 2. Right foot pain, question Benavides's neuroma. Plan:  1. Ice, moist heat, shoe insert. If does not improve over the next two to three weeks follow up for podiatrist versus ortho referral.  2. Recheck here otherwise prn.

## 2019-02-04 ENCOUNTER — HOSPITAL ENCOUNTER (EMERGENCY)
Age: 59
Discharge: HOME | End: 2019-02-04
Attending: EMERGENCY MEDICINE
Payer: COMMERCIAL

## 2019-02-04 ENCOUNTER — TELEPHONE (OUTPATIENT)
Dept: FAMILY MEDICINE CLINIC | Age: 59
End: 2019-02-04

## 2019-02-04 VITALS
WEIGHT: 170 LBS | TEMPERATURE: 98.2 F | DIASTOLIC BLOOD PRESSURE: 77 MMHG | RESPIRATION RATE: 14 BRPM | HEART RATE: 96 BPM | OXYGEN SATURATION: 95 % | HEIGHT: 70 IN | BODY MASS INDEX: 24.34 KG/M2 | SYSTOLIC BLOOD PRESSURE: 124 MMHG

## 2019-02-04 DIAGNOSIS — I48.92 ATRIAL FLUTTER WITH RAPID VENTRICULAR RESPONSE (HCC): Primary | ICD-10-CM

## 2019-02-04 LAB
ALBUMIN SERPL-MCNC: 3.8 G/DL (ref 3.5–5)
ALBUMIN/GLOB SERPL: 1 {RATIO} (ref 1.1–2.2)
ALP SERPL-CCNC: 88 U/L (ref 45–117)
ALT SERPL-CCNC: 34 U/L (ref 12–78)
ANION GAP SERPL CALC-SCNC: 8 MMOL/L (ref 5–15)
AST SERPL-CCNC: 25 U/L (ref 15–37)
BASOPHILS # BLD: 0 K/UL (ref 0–0.1)
BASOPHILS NFR BLD: 0 % (ref 0–1)
BILIRUB SERPL-MCNC: 0.2 MG/DL (ref 0.2–1)
BNP SERPL-MCNC: 55 PG/ML (ref 0–125)
BUN SERPL-MCNC: 16 MG/DL (ref 6–20)
BUN/CREAT SERPL: 17 (ref 12–20)
CALCIUM SERPL-MCNC: 9.4 MG/DL (ref 8.5–10.1)
CHLORIDE SERPL-SCNC: 107 MMOL/L (ref 97–108)
CO2 SERPL-SCNC: 27 MMOL/L (ref 21–32)
COMMENT, HOLDF: NORMAL
CREAT SERPL-MCNC: 0.95 MG/DL (ref 0.55–1.02)
DIFFERENTIAL METHOD BLD: NORMAL
EOSINOPHIL # BLD: 0.1 K/UL (ref 0–0.4)
EOSINOPHIL NFR BLD: 1 % (ref 0–7)
ERYTHROCYTE [DISTWIDTH] IN BLOOD BY AUTOMATED COUNT: 12.2 % (ref 11.5–14.5)
GLOBULIN SER CALC-MCNC: 3.8 G/DL (ref 2–4)
GLUCOSE SERPL-MCNC: 119 MG/DL (ref 65–100)
HCT VFR BLD AUTO: 42 % (ref 35–47)
HGB BLD-MCNC: 13.5 G/DL (ref 11.5–16)
IMM GRANULOCYTES # BLD AUTO: 0 K/UL (ref 0–0.04)
IMM GRANULOCYTES NFR BLD AUTO: 0 % (ref 0–0.5)
LYMPHOCYTES # BLD: 2.5 K/UL (ref 0.8–3.5)
LYMPHOCYTES NFR BLD: 35 % (ref 12–49)
MAGNESIUM SERPL-MCNC: 2.3 MG/DL (ref 1.6–2.4)
MCH RBC QN AUTO: 30.3 PG (ref 26–34)
MCHC RBC AUTO-ENTMCNC: 32.1 G/DL (ref 30–36.5)
MCV RBC AUTO: 94.2 FL (ref 80–99)
MONOCYTES # BLD: 0.6 K/UL (ref 0–1)
MONOCYTES NFR BLD: 9 % (ref 5–13)
NEUTS SEG # BLD: 4 K/UL (ref 1.8–8)
NEUTS SEG NFR BLD: 55 % (ref 32–75)
NRBC # BLD: 0 K/UL (ref 0–0.01)
NRBC BLD-RTO: 0 PER 100 WBC
PLATELET # BLD AUTO: 200 K/UL (ref 150–400)
PMV BLD AUTO: 10.7 FL (ref 8.9–12.9)
POTASSIUM SERPL-SCNC: 3.6 MMOL/L (ref 3.5–5.1)
PROT SERPL-MCNC: 7.6 G/DL (ref 6.4–8.2)
RBC # BLD AUTO: 4.46 M/UL (ref 3.8–5.2)
SAMPLES BEING HELD,HOLD: NORMAL
SODIUM SERPL-SCNC: 142 MMOL/L (ref 136–145)
TROPONIN I SERPL-MCNC: <0.05 NG/ML
TSH SERPL DL<=0.05 MIU/L-ACNC: 2.67 UIU/ML (ref 0.36–3.74)
WBC # BLD AUTO: 7.3 K/UL (ref 3.6–11)

## 2019-02-04 PROCEDURE — 93005 ELECTROCARDIOGRAM TRACING: CPT

## 2019-02-04 PROCEDURE — 83735 ASSAY OF MAGNESIUM: CPT

## 2019-02-04 PROCEDURE — 85025 COMPLETE CBC W/AUTO DIFF WBC: CPT

## 2019-02-04 PROCEDURE — 84484 ASSAY OF TROPONIN QUANT: CPT

## 2019-02-04 PROCEDURE — 36415 COLL VENOUS BLD VENIPUNCTURE: CPT

## 2019-02-04 PROCEDURE — 99285 EMERGENCY DEPT VISIT HI MDM: CPT

## 2019-02-04 PROCEDURE — 83880 ASSAY OF NATRIURETIC PEPTIDE: CPT

## 2019-02-04 PROCEDURE — 84443 ASSAY THYROID STIM HORMONE: CPT

## 2019-02-04 PROCEDURE — 80053 COMPREHEN METABOLIC PANEL: CPT

## 2019-02-04 PROCEDURE — 74011250637 HC RX REV CODE- 250/637: Performed by: EMERGENCY MEDICINE

## 2019-02-04 RX ORDER — METOPROLOL SUCCINATE 50 MG/1
50 TABLET, EXTENDED RELEASE ORAL
Status: COMPLETED | OUTPATIENT
Start: 2019-02-04 | End: 2019-02-04

## 2019-02-04 RX ORDER — METOPROLOL SUCCINATE 50 MG/1
50 TABLET, EXTENDED RELEASE ORAL DAILY
Qty: 30 TAB | Refills: 0 | Status: SHIPPED | OUTPATIENT
Start: 2019-02-04 | End: 2019-02-05

## 2019-02-04 RX ADMIN — METOPROLOL SUCCINATE 50 MG: 50 TABLET, EXTENDED RELEASE ORAL at 18:28

## 2019-02-04 NOTE — ED TRIAGE NOTES
Pt arrives from home with c/o of chest palpitations that started this evening. Patient has no history of a.fibb and states that she was SOB.

## 2019-02-04 NOTE — TELEPHONE ENCOUNTER
Patient called office, verified two patient identifiers, name and , patient stated that her heart feels like it is playing leap frog in her chest, she can look at her chest and see her heart actually beating, patient denies numbness or tingling in arm/jaws, denies visual disturbances/dizziness/or sweating. Patient stated that it is not skipping beats just pounding, no SOB. Writer advised patient to have someone either drive her to ER or call EMS to transport, Patient asked could she just go to patient first writer advised ER to eval and treat, patient understands not to drive herself.

## 2019-02-04 NOTE — ED PROVIDER NOTES
64 y.o. female with past medical history significant for endometriosis, murmur, vocal cord dysfunction, vitamin D deficiency, tongue lesion, vertigo, tick bite, GERD, reflux esophagitis, and menopause who presents from home with chief complaint of palpitations. Patient states that she began experiencing palpitations about 1 hour ago. She notes that her heart began \"pounding hard and fast.\"  She adds that she sat down after onset of her symptoms and could feel her heart \"flip flopping in her chest.\"  Patient claims that she has had similar episodes in the past but notes that her symptoms typically resolve shortly after onset. This episode, however, patient states that her symptoms have persisted longer than usual.  She denies any episodes within the past month but states that she was evaluated for similar symptoms about 2 years ago. She also states that she has been evaluated at the ED for SOB but claims that her symptoms at that time may have been related to [place]. \"  Patient reports that she has taken turmeric for right shoulder inflammation and claritin but has not used these medications within the past abdullahi weeks. She does admit to taking pepcid. Of note, patient's sister states that she has had similar symptoms of palpitations in the past and notes that her symptoms were resolved with \"beta blocker medications. \"  Patient denies chest pain, SOB, recent travel, and past h/o blood clots, diabetes, and HTN. There are no other acute medical concerns at this time. Social hx: negative tobacco, alcohol, and drug use Cardiologist: Dr. Bridgett Patel PCP: Chitra Grant MD 
 
Note written by Kaitlynn Barry, as dictated by Aj Bains MD 5:07 PM 
 
 
 
The history is provided by the patient and a relative. No  was used. Past Medical History:  
Diagnosis Date  Advance directive discussed with patient 09/13/2016  
 has info  Endometriosis  GERD (gastroesophageal reflux disease) 07/17/2017 GI eval note Bob Thomas post visit to ED for atypical chest pains  Menopause FBP-  Murmur  Reflux esophagitis 07/27/2017 initial eval note 7/27/17 M[facility] Degree note and 8/1/17 EGD report  Tick bite 06/19/2017 Removed with no problems.  Tongue lesion 4/27/16  
 area removed per note from [facility]  Vertigo  Vitamin D deficiency  Vocal cord dysfunction   
 due to significant allergies Past Surgical History:  
Procedure Laterality Date  ABDOMEN SURGERY PROC UNLISTED  1989  
 cyst removed with right ovary  HX ABDOMINAL LAPAROSCOPY    
 x 2  
 HX COLONOSCOPY  2007  HX COLONOSCOPY  7/26/16  
 10 yr repeat Seabron Gourd  HX ENDOSCOPY  08/01/2017 Seabron Gourd  path report rec'd  HX HYSTERECTOMY  2004  HX OTHER SURGICAL  4/27/16 path pend  
 lesion on left lateral border of tongue  HX OTHER SURGICAL    
 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    
 HX RIGHT SALPINGO-OOPHORECTOMY  1980's  
 secondary to mass No family history on file. Social History Socioeconomic History  Marital status: SINGLE Spouse name: Not on file  Number of children: Not on file  Years of education: Not on file  Highest education level: Not on file Social Needs  Financial resource strain: Not on file  Food insecurity - worry: Not on file  Food insecurity - inability: Not on file  Transportation needs - medical: Not on file  Transportation needs - non-medical: Not on file Occupational History  Not on file Tobacco Use  Smoking status: Never Smoker  Smokeless tobacco: Never Used Substance and Sexual Activity  Alcohol use: No  
 Drug use: No  
 Sexual activity: Not on file Other Topics Concern  Not on file Social History Narrative  Not on file ALLERGIES: Morphine and Penicillins Review of Systems Constitutional: Negative for chills and fever. Respiratory: Negative for chest tightness and shortness of breath. Cardiovascular: Positive for palpitations. Negative for chest pain. Gastrointestinal: Negative for abdominal pain, diarrhea, nausea and vomiting. Neurological: Negative for headaches. All other systems reviewed and are negative. Vitals:  
 02/04/19 1718 BP: 125/89 Pulse: (!) 105 Resp: 17 Temp: 97.9 °F (36.6 °C) SpO2: 98% Weight: 77.1 kg (170 lb) Height: 5' 10\" (1.778 m) Physical Exam  
Constitutional: She is oriented to person, place, and time. She appears well-developed and well-nourished. HENT:  
Head: Normocephalic and atraumatic. Eyes: Conjunctivae are normal.  
Neck: Normal range of motion. Cardiovascular: Regular rhythm, normal heart sounds and intact distal pulses. No murmur heard. Tachycardic Pulmonary/Chest: Effort normal and breath sounds normal. No stridor. No respiratory distress. She has no wheezes. Abdominal: Soft. Bowel sounds are normal. She exhibits no distension and no mass. There is no tenderness. There is no guarding. Musculoskeletal: Normal range of motion. She exhibits no edema. Neurological: She is alert and oriented to person, place, and time. No cranial nerve deficit. Coordination normal.  
Skin: Skin is warm and dry. Nursing note and vitals reviewed. MDM Number of Diagnoses or Management Options Atrial flutter with rapid ventricular response [facility]):  
Diagnosis management comments: Atrial flutter- new onset- check electrolytes, tsh, monitor Amount and/or Complexity of Data Reviewed Clinical lab tests: ordered and reviewed Discuss the patient with other providers: yes (cardiology) Independent visualization of images, tracings, or specimens: yes (ekg) Patient Progress Patient progress: stable Procedures ED EKG interpretation: Rhythm: atrial flutter; and irregular. Rate (approx.): 150; Axis: normal;QRS interval: normal ; ST/T wave: non-specific changes;  
EKG documented by Linh Iglesias MD, scribblanca, as interpreted by Ab Hancock MD, ED MD. 
 
 ED EKG interpretation: 
Rhythm: sinus tach; and regular . Rate (approx.): 100; Axis: normal; P wave: normal; QRS interval: normal ; ST/T wave: non-specific changes EKG documented by Linh Iglesias MD, edilson, as interpreted by Ab Hancock MD, ED MD. Pt converted on own CONSULT NOTE: 
6:00 PM Ab Hancock MD spoke with Dr. Alexandrea Molina. Venkat, Consult for Cardiology. Discussed available diagnostic tests and clinical findings. Dr. Laurel Soria recommends starting the patient on 48 of toprol XL and 81 of aspirin. Patient can follow up in the office. Spoke with pt she is aware to take baby aspirin daily and follow up with cardiology. She will return [facility]

## 2019-02-04 NOTE — DISCHARGE INSTRUCTIONS
Patient Education               We hope that we have addressed all of your medical concerns. The examination and treatment you received in the Emergency Department were for an emergent problem and were not intended as complete care. It is important that you follow up with your healthcare provider(s) for ongoing care. If your symptoms worsen or do not improve as expected, and you are unable to reach your usual health care provider(s), you should return to the Emergency Department. Today's healthcare is undergoing tremendous change, and patient satisfaction surveys are one of the many tools to assess the quality of medical care. You may receive a survey from the [employer] organization regarding your experience in the Emergency Department. I hope that your experience has been completely positive, particularly the medical care that I provided. As such, please participate in the survey; anything less than excellent does not meet my expectations or intentions. [facility] and [facility] participate in nationally recognized quality of care measures. If your blood pressure is greater than 120/80, as reported below, we urge that you seek medical care to address the potential of high blood pressure, commonly known as hypertension. Hypertension can be hereditary or can be caused by certain medical conditions, pain, stress, or \"white coat syndrome. \"       Please make an appointment with your health care provider(s) for follow up of your Emergency Department visit. VITALS:   Patient Vitals for the past 8 hrs:   Temp Pulse Resp BP SpO2   02/04/19 1745 98 °F (36.7 °C) 96 15 131/84 96 %   02/04/19 1730 -- (!) 106 11 126/90 94 %   02/04/19 1718 97.9 °F (36.6 °C) (!) 105 17 125/89 98 %          Thank you for allowing us to provide you with medical care today. We realize that you have many choices for your emergency care needs.   Please choose us in the future for any continued health care needs. Alexis Szymanski MD    [facility], [facility].   Office: [phone]            Recent Results (from the past 24 hour(s))   CBC WITH AUTOMATED DIFF    Collection Time: 02/04/19  5:12 PM   Result Value Ref Range    WBC 7.3 3.6 - 11.0 K/uL    RBC 4.46 3.80 - 5.20 M/uL    HGB 13.5 11.5 - 16.0 g/dL    HCT 42.0 35.0 - 47.0 %    MCV 94.2 80.0 - 99.0 FL    MCH 30.3 26.0 - 34.0 PG    MCHC 32.1 30.0 - 36.5 g/dL    RDW 12.2 11.5 - 14.5 %    PLATELET 069 437 - 430 K/uL    MPV 10.7 8.9 - 12.9 FL    NRBC 0.0 0  WBC    ABSOLUTE NRBC 0.00 0.00 - 0.01 K/uL    NEUTROPHILS 55 32 - 75 %    LYMPHOCYTES 35 12 - 49 %    MONOCYTES 9 5 - 13 %    EOSINOPHILS 1 0 - 7 %    BASOPHILS 0 0 - 1 %    IMMATURE GRANULOCYTES 0 0.0 - 0.5 %    ABS. NEUTROPHILS 4.0 1.8 - 8.0 K/UL    ABS. LYMPHOCYTES 2.5 0.8 - 3.5 K/UL    ABS. MONOCYTES 0.6 0.0 - 1.0 K/UL    ABS. EOSINOPHILS 0.1 0.0 - 0.4 K/UL    ABS. BASOPHILS 0.0 0.0 - 0.1 K/UL    ABS. IMM. GRANS. 0.0 0.00 - 0.04 K/UL    DF AUTOMATED     METABOLIC PANEL, COMPREHENSIVE    Collection Time: 02/04/19  5:12 PM   Result Value Ref Range    Sodium 142 136 - 145 mmol/L    Potassium 3.6 3.5 - 5.1 mmol/L    Chloride 107 97 - 108 mmol/L    CO2 27 21 - 32 mmol/L    Anion gap 8 5 - 15 mmol/L    Glucose 119 (H) 65 - 100 mg/dL    BUN 16 6 - 20 MG/DL    Creatinine 0.95 0.55 - 1.02 MG/DL    BUN/Creatinine ratio 17 12 - 20      GFR est AA >60 >60 ml/min/1.73m2    GFR est non-AA >60 >60 ml/min/1.73m2    Calcium 9.4 8.5 - 10.1 MG/DL    Bilirubin, total 0.2 0.2 - 1.0 MG/DL    ALT (SGPT) 34 12 - 78 U/L    AST (SGOT) 25 15 - 37 U/L    Alk.  phosphatase 88 45 - 117 U/L    Protein, total 7.6 6.4 - 8.2 g/dL    Albumin 3.8 3.5 - 5.0 g/dL    Globulin 3.8 2.0 - 4.0 g/dL    A-G Ratio 1.0 (L) 1.1 - 2.2     TROPONIN I    Collection Time: 02/04/19  5:12 PM   Result Value Ref Range    Troponin-I, Qt. <0.05 <0.05 ng/mL   NT-PRO BNP    Collection Time: 02/04/19  5:12 PM   Result Value Ref Range    NT pro-BNP 55 0 - 125 PG/ML   MAGNESIUM    Collection Time: 02/04/19  5:12 PM   Result Value Ref Range    Magnesium 2.3 1.6 - 2.4 mg/dL   TSH 3RD GENERATION    Collection Time: 02/04/19  5:12 PM   Result Value Ref Range    TSH 2.67 0.36 - 3.74 uIU/mL   SAMPLES BEING HELD    Collection Time: 02/04/19  5:12 PM   Result Value Ref Range    SAMPLES BEING HELD 1RED 1BLU     COMMENT        Add-on orders for these samples will be processed based on acceptable specimen integrity and analyte stability, which may vary by analyte. EKG, 12 LEAD, SUBSEQUENT    Collection Time: 02/04/19  5:31 PM   Result Value Ref Range    Ventricular Rate 101 BPM    Atrial Rate 101 BPM    P-R Interval 128 ms    QRS Duration 90 ms    Q-T Interval 344 ms    QTC Calculation (Bezet) 446 ms    Calculated P Axis 87 degrees    Calculated R Axis 83 degrees    Calculated T Axis 46 degrees    Diagnosis       Sinus tachycardia  When compared with ECG of 16-JUN-2017 09:04,  No significant change was found         No results found. Atrial Flutter: Care Instructions  Your Care Instructions    Atrial flutter is a type of heartbeat problem (arrhythmia) that usually causes a fast heart rate. In atrial flutter, a problem with the heart's electrical system causes the two upper parts of the heart (the right atrium and the left atrium) to flutter, or beat very fast. Atrial flutter might be diagnosed using an an electrocardiogram (EKG). An EKG translates the heart's electrical activity into line tracings on paper. Treating atrial flutter is important for several reasons. The change in heartbeat can cause blood clots. The clots can travel from your heart to your brain and cause a stroke. A fast heartbeat can make you feel lightheaded, dizzy, and weak. And over time, it can also increase your risk for heart failure.   Atrial flutter is often the result of another heart condition, such as coronary artery disease or some other heart rhythm problems. Making changes to improve your heart health will help you stay healthy and active. Your doctor may prescribe medicines to help slow down your heartbeat. You may also take medicine to help prevent a stroke. In some cases, a procedure called catheter ablation is done to stop atrial flutter. Follow-up care is a key part of your treatment and safety. Be sure to make and go to all appointments, and call your doctor if you are having problems. It's also a good idea to know your test results and keep a list of the medicines you take. How can you care for yourself at home? Medicines    · Take your medicines exactly as prescribed. Call your doctor if you think you are having a problem with your medicine. You will get more details on the specific medicines your doctor prescribes.     · If your doctor has given you a blood thinner to prevent a stroke, be sure you get instructions about how to take your medicine safely. Blood thinners can cause serious bleeding problems.     · Do not take any vitamins, over-the-counter drugs, or herbal products without talking to your doctor first.    Lifestyle changes    · Do not smoke. Smoking can increase your chance of a stroke and heart attack. If you need help quitting, talk to your doctor about stop-smoking programs and medicines. These can increase your chances of quitting for good.     · Eat a heart-healthy diet.     · Stay at a healthy weight. Lose weight if you need to.     · Limit alcohol to 2 drinks a day for men and 1 drink a day for women. Too much alcohol can cause health problems.     · Avoid colds and flu. Get a pneumococcal vaccine shot. If you have had one before, ask your doctor whether you need another dose. Get a flu shot every year. If you must be around people with colds or flu, wash your hands often. Activity    · Talk to your doctor about what type and level of exercise is safe for you. Start light exercise if your doctor says it is okay. Walking is a good choice. Try for at least 30 minutes on most days of the week. You also may want to swim, bike, or do other activities.     · When you exercise, watch for signs that your heart is working too hard. You are pushing too hard if you can't talk while you exercise. If you become short of breath or dizzy or have chest pain, sit down and rest right away. When should you call for help? Call 911 anytime you think you may need emergency care. For example, call if:    · You have symptoms of a stroke. These may include:  ? Sudden numbness, tingling, weakness, or loss of movement in your face, arm, or leg, especially on only one side of your body. ? Sudden vision changes. ? Sudden trouble speaking. ? Sudden confusion or trouble understanding simple statements. ? Sudden problems with walking or balance. ? A sudden, severe headache that is different from past headaches.     · You passed out (lost consciousness).    Call your doctor now or seek immediate medical care if:    · You have new or increased shortness of breath.     · You feel dizzy or lightheaded, or you feel like you may faint.     · Your heart rate becomes irregular.     · You can feel your heart flutter in your chest or skip heartbeats. Tell your doctor if these symptoms are new or worse.    Watch closely for changes in your health, and be sure to contact your doctor if you have any problems. Where can you learn more? Go to http://lenny-gage.info/. Enter Y957 in the search box to learn more about \"Atrial Flutter: Care Instructions. \"  Current as of: July 22, 2018  Content Version: 11.9  © 2981-1754 [employer]wise, Incorporated. Care instructions adapted under license by [employer] (which disclaims liability or warranty for this information).  If you have questions about a medical condition or this instruction, always ask your healthcare professional. [place] [address] any warranty or liability for your use of this information.

## 2019-02-05 ENCOUNTER — OFFICE VISIT (OUTPATIENT)
Dept: CARDIOLOGY CLINIC | Age: 59
End: 2019-02-05

## 2019-02-05 ENCOUNTER — TELEPHONE (OUTPATIENT)
Dept: CARDIOLOGY CLINIC | Age: 59
End: 2019-02-05

## 2019-02-05 VITALS
OXYGEN SATURATION: 96 % | HEIGHT: 70 IN | RESPIRATION RATE: 12 BRPM | SYSTOLIC BLOOD PRESSURE: 100 MMHG | DIASTOLIC BLOOD PRESSURE: 70 MMHG | BODY MASS INDEX: 24.77 KG/M2 | HEART RATE: 68 BPM | WEIGHT: 173 LBS

## 2019-02-05 DIAGNOSIS — I48.0 PAROXYSMAL ATRIAL FIBRILLATION (HCC): Primary | ICD-10-CM

## 2019-02-05 DIAGNOSIS — I48.91 ATRIAL FIBRILLATION, UNSPECIFIED TYPE (HCC): ICD-10-CM

## 2019-02-05 LAB
ATRIAL RATE: 101 BPM
ATRIAL RATE: 359 BPM
CALCULATED P AXIS, ECG09: 87 DEGREES
CALCULATED R AXIS, ECG10: 83 DEGREES
CALCULATED R AXIS, ECG10: 83 DEGREES
CALCULATED T AXIS, ECG11: -38 DEGREES
CALCULATED T AXIS, ECG11: 46 DEGREES
DIAGNOSIS, 93000: NORMAL
DIAGNOSIS, 93000: NORMAL
P-R INTERVAL, ECG05: 128 MS
Q-T INTERVAL, ECG07: 304 MS
Q-T INTERVAL, ECG07: 344 MS
QRS DURATION, ECG06: 90 MS
QRS DURATION, ECG06: 92 MS
QTC CALCULATION (BEZET), ECG08: 446 MS
QTC CALCULATION (BEZET), ECG08: 494 MS
VENTRICULAR RATE, ECG03: 101 BPM
VENTRICULAR RATE, ECG03: 159 BPM

## 2019-02-05 RX ORDER — GUAIFENESIN 100 MG/5ML
81 LIQUID (ML) ORAL DAILY
COMMUNITY
End: 2019-03-28

## 2019-02-05 RX ORDER — METOPROLOL SUCCINATE 25 MG/1
25 TABLET, EXTENDED RELEASE ORAL DAILY
Qty: 30 TAB | Refills: 11 | Status: SHIPPED | OUTPATIENT
Start: 2019-02-05 | End: 2019-02-13 | Stop reason: SINTOL

## 2019-02-05 RX ORDER — MELATONIN
DAILY
COMMUNITY
End: 2019-11-21

## 2019-02-05 NOTE — PROGRESS NOTES
HISTORY OF PRESENT ILLNESS  Breana Santos is a 64 y.o. female. She is seen in follow up for paroxysmal atrial fibrillation/flutter. She has had palpitations in the past and saw Dr. Caleb Alfaro in 2017 in the emergency department for shortness of breath. A stress echocardiogram was apparently normal at that time although I cannot find a record of the echocardiographic images. Yesterday she was cleaning some windows and then had a feeling of a rapid heart rate. She went to the emergency department where an EKG showed atrial fibrillation/flutter with a heart rate of 159 beats per minute. She was given medication and she had returned to sinus rhythm. It was discussed with Dr. Itzel Suarez who was on call who recommended Toprol XL 50 mg a day. She was given a dose there, but she has not filled the prescription yet. Her blood pressure in the office today is only 100/70 and she feels somewhat weak and has some shortness of breath when walking up stairs. She has no prior history of treated hypertension. She normally can walk 4 miles without difficulty. She does not smoke cigarettes. Her labs done in the ER were reviewed by me and were unremarkable including a TSH and magnesium. 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  TURMERIC PO Take  by mouth daily.  aspirin 81 mg chewable tablet Take 81 mg by mouth daily.  metoprolol succinate (TOPROL-XL) 25 mg XL tablet Take 1 Tab by mouth daily. 30 Tab 11    loratadine (CLARITIN) 10 mg tablet Take 10 mg by mouth daily as needed.  famotidine (PEPCID) 20 mg tablet Take 20 mg by mouth two (2) times daily as needed.  mometasone (NASONEX) 50 mcg/actuation nasal spray 2 Sprays daily as needed. Past Medical History:   Diagnosis Date    Advance directive discussed with patient 09/13/2016    has info    Endometriosis     GERD (gastroesophageal reflux disease) 07/17/2017    GI eval note Lissette Robles post visit to ED for atypical chest pains    Menopause     LMP-    Murmur     Reflux esophagitis 07/27/2017 initial eval note    7/27/17 Sophia Gum note and 8/1/17 EGD report    Tick bite 06/19/2017    Removed with no problems.  Tongue lesion 4/27/16    area removed per note from [facility]    Vertigo     Vitamin D deficiency     Vocal cord dysfunction     due to significant allergies       Past Surgical History:   Procedure Laterality Date    ABDOMEN SURGERY PROC UNLISTED  1989    cyst removed with right ovary    HX ABDOMINAL LAPAROSCOPY      x 2    HX COLONOSCOPY  2007    HX COLONOSCOPY  7/26/16    10 yr repeat Yokasta Snyder    HX ENDOSCOPY  08/01/2017    Yokasta Snyder  path report rec'd    HX HYSTERECTOMY  2004    HX OTHER SURGICAL  4/27/16 path pend    lesion on left lateral border of tongue    HX OTHER SURGICAL      basal cell face-2005(in [facility])    HX RETINAL DETACHMENT REPAIR      HX RIGHT SALPINGO-OOPHORECTOMY  1980's    secondary to mass       Review of Systems   Cardiovascular: Positive for palpitations. Neurological: Positive for weakness. All other systems reviewed and are negative. Visit Vitals  /70 (BP 1 Location: Right arm, BP Patient Position: Sitting)   Pulse 68   Resp 12   Ht 5' 10\" (1.778 m)   Wt 173 lb (78.5 kg)   SpO2 96%   BMI 24.82 kg/m²       Physical Exam   Constitutional: She is oriented to person, place, and time. She appears well-nourished. HENT:   Head: Atraumatic. Eyes: Conjunctivae are normal.   Neck: Neck supple. Cardiovascular: Normal rate, regular rhythm and normal heart sounds. Exam reveals no gallop and no friction rub. No murmur heard. Pulmonary/Chest: Breath sounds normal. She has no wheezes. Abdominal: Bowel sounds are normal.   Musculoskeletal: She exhibits no edema. Neurological: She is oriented to person, place, and time. Skin: Skin is dry. Psychiatric: Her behavior is normal.   Nursing note and vitals reviewed. ASSESSMENT and PLAN  She is in normal sinus rhythm to exam today although another EKG was not repeated it was sinus rhythm before she left the ER. I do not think she can tolerate the Toprol XL 50 mg a day but I do think that she should take something to try to prevent recurrence of the arrhythmia. I do not think she qualifies for any long term anticoagulant given her [id number] score of 0. I am not sure that aspirin would be beneficial. I will, however, send her a prescription for Toprol XL only 25 mg to take once daily. I will have her return to see Dr. Clarence Mayo in several weeks and have an echocardiogram done in the office at that time with further disposition to follow. If she has any recurrence of the atrial fibrillation, she will call us. She might have to see Dr. Kemi Urias for consideration of ablation if that is the case. She does apparently have a family history of atrial fibrillation.

## 2019-02-05 NOTE — PROGRESS NOTES
Chief Complaint   Patient presents with    Irregular Heart Beat     Previous patient of Dr. Nydia Carrillo. Seen in ER last night for a-fib. Denies chest pain/swelling. Complaints of shortness of breath/dizziness (vertigo). Visit Vitals  /70 (BP 1 Location: Right arm, BP Patient Position: Sitting)   Pulse 68   Resp 12   Ht 5' 10\" (1.778 m)   Wt 173 lb (78.5 kg)   SpO2 96%   BMI 24.82 kg/m²   .

## 2019-02-05 NOTE — TELEPHONE ENCOUNTER
Returned patient's call, 2 pt identifiers used  Advised patient she was last seen 2013. She would be a new patient now. Scheduled patient to be seen with an available provider.     Future Appointments   Date Time Provider Albin Amaral   2/5/2019 11:00 AM Clinton Leal  [address]

## 2019-02-05 NOTE — TELEPHONE ENCOUNTER
Pt called insisting on being seen today by Dr. Adi Foss, following an ER visit she had on yesterday for AFIB.   Phone # [phone]  Thanks

## 2019-02-08 ENCOUNTER — TELEPHONE (OUTPATIENT)
Dept: CARDIOLOGY CLINIC | Age: 59
End: 2019-02-08

## 2019-02-08 ENCOUNTER — HOSPITAL ENCOUNTER (EMERGENCY)
Age: 59
Discharge: HOME | End: 2019-02-08
Attending: EMERGENCY MEDICINE
Payer: COMMERCIAL

## 2019-02-08 VITALS
SYSTOLIC BLOOD PRESSURE: 116 MMHG | HEART RATE: 71 BPM | WEIGHT: 173 LBS | OXYGEN SATURATION: 100 % | RESPIRATION RATE: 20 BRPM | TEMPERATURE: 97.4 F | BODY MASS INDEX: 24.77 KG/M2 | HEIGHT: 70 IN | DIASTOLIC BLOOD PRESSURE: 74 MMHG

## 2019-02-08 DIAGNOSIS — I48.0 PAROXYSMAL ATRIAL FIBRILLATION (HCC): ICD-10-CM

## 2019-02-08 DIAGNOSIS — R55 NEAR SYNCOPE: Primary | ICD-10-CM

## 2019-02-08 LAB
ANION GAP SERPL CALC-SCNC: 9 MMOL/L (ref 5–15)
BUN SERPL-MCNC: 16 MG/DL (ref 6–20)
BUN/CREAT SERPL: 17 (ref 12–20)
CALCIUM SERPL-MCNC: 9.1 MG/DL (ref 8.5–10.1)
CHLORIDE SERPL-SCNC: 107 MMOL/L (ref 97–108)
CO2 SERPL-SCNC: 27 MMOL/L (ref 21–32)
COMMENT, HOLDF: NORMAL
CREAT SERPL-MCNC: 0.92 MG/DL (ref 0.55–1.02)
GLUCOSE SERPL-MCNC: 123 MG/DL (ref 65–100)
POTASSIUM SERPL-SCNC: 3.9 MMOL/L (ref 3.5–5.1)
SAMPLES BEING HELD,HOLD: NORMAL
SODIUM SERPL-SCNC: 143 MMOL/L (ref 136–145)
TROPONIN I SERPL-MCNC: <0.05 NG/ML

## 2019-02-08 PROCEDURE — 99285 EMERGENCY DEPT VISIT HI MDM: CPT

## 2019-02-08 PROCEDURE — 84484 ASSAY OF TROPONIN QUANT: CPT

## 2019-02-08 PROCEDURE — 36415 COLL VENOUS BLD VENIPUNCTURE: CPT

## 2019-02-08 PROCEDURE — 93005 ELECTROCARDIOGRAM TRACING: CPT

## 2019-02-08 PROCEDURE — 80048 BASIC METABOLIC PNL TOTAL CA: CPT

## 2019-02-08 NOTE — ED TRIAGE NOTES
Seen in ED on Monday and dx with new onset A-Fib.  While at work, developed dizziness and felt near syncopal.

## 2019-02-08 NOTE — TELEPHONE ENCOUNTER
Patient called. Verified patient's identity with two identifiers. Patient was seen by Dr. Vianney Marshall this past Tuesday 2/5. She stated she has been experiencing SOB and \"still feels bad. \" She stated her BP is 110/70, HR 68. She is concerned she may have something going on causing her SOB. I asked if she had any new sxs because Dr. Vianney Marshall mentioned her having SOB when walking up the stairs in his note. Patient denied new sxs, except stated SOB is now sometimes at rest. Patient asked if Dr. Vianney Marshall was in the office and she would like to be seen. Scheduled patient to come in today. We will do an EKG and she can discuss her concerns further with Dr. Vianney Marshall. Discussed signs and symptoms qualifying urgent care or call to office. Patient verbalized understanding and denied further questions or concerns.       Future Appointments   Date Time Provider Albin Amaral   2/8/2019  1:20 PM Sara Burnham  [address]   3/6/2019 10:00 AM Gogo HOWARD   3/6/2019 10:40 AM Sara Burnham  [address]

## 2019-02-08 NOTE — PROGRESS NOTES
Cardiology Consult Note Patient Name: Crystal Quintana  : 1960 MRN: [id number] Date: 2019  Time: 12:15 PM 
 
Admit Diagnosis: No admission diagnoses are documented for this encounter. Primary Cardiologist: Yojana Castillo. Viral Dawn M.D. Consulting Cardiologist: Raven Walker M.D. 
 
Reason for Consult: Near Syncope with, paroxysmal atrial fibrillation Requesting MD: Jose Carlos Gonzalez HPI: 
Crystal Quintana is a 64 y.o. female admitted on 2019  for No admission diagnoses are documented for this encounter. .   has a past medical history of Advance directive discussed with patient (2016), Endometriosis, GERD (gastroesophageal reflux disease) (2017), Menopause, Murmur, Reflux esophagitis (2017 initial eval note), Tick bite (2017), Tongue lesion (16), Vertigo, Vitamin D deficiency, and Vocal cord dysfunction. Subjective: Patient presents to the ED after having what she calls \" just not feeling right\". She was at work and noticed that she felt like she was going to pass out, but says she never did. She took her BP and states that it was running 110/74, which she says is where she normally sits. She has recently saw Dr. Shimon Russell in the off, Monday for a new set of Afib, and was placed on Metoprolol 50 mg once a day. She was having an increase in headaches, felt weak and fatigued, so her metoprolol was changed to Metoprolol 25 mg once a day. She was to be seen in the office by Dr. Shimon Russell today. While present in the room, MD noticed change in T waves, for possible PVC's. Patient states that she, \"could feel something coming on and she did not feel right, but slowing she started to feel okay\". States that she felt this way, prior to coming in too. She denies any chest discomfort, fevers, and chills. Assessment and Plan 1. Near Syncope with paroxysmal atrial fibrillation  - Stop BB 
 - PVC noted on telemetry - d/w Dr. Piyush Mercedes - ok to discharge and stop BB. 
 - Follow up in office next week for echo. - stop all caffeine use. Future Appointments Date Time Provider Albin Amaral 2/13/2019  8:00 AM VASCULAR, [address]  
2/13/2019  9:00 AM Joseph Baca  [address] Patient Active Problem List  
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  
 Defect, retinal H33.309  Non-allergic rhinitis J31.0  Multiple allergies Z88.9  Osteopenia M85.80  
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 Review of Systems: 
 
 GENERAL Recent weight loss - no Fever -----------------   no 
 Chills -----------------   no 
 
 EYES, VISION Visual Changes - no 
 
 EARS, NOSE, THROAT Hearing loss ----------- no 
 Swallowing difficulties - no CARDIOVASCULAR Chest pain/pressure ---- no 
 Arrhythmia/palpitations - yes RESPIRATORY Cough ------------------ no Shortness of breath - no Wheezing -------------- no  GASTROINTESTINAL Abdominal pain - no Heartburn -------- no 
 Bloody stool ----- no 
 
 GENITOURINARY Frequent urination - no Urgency -------------- no 
 MUSCULOSKELETAL Joint pain/swelling ---- no 
 Musculoskeletal pain - no 
 
 SKIN & INTEGUMENTARY Rashes - no Sores --- no 
 
 
   NEUROLOGICAL Numbness/tingling - no Sensation loss ------ no 
 
 PSYCHIATRIC Nervousness/anxiety - no Depression -------------- no 
 
 ENDOCRINE Heat/cold intolerance - no Excessive thirst -------- no 
 
 HEMATOLOGIC/LYMPHATIC Abnormal bleeding - no ALL/IMMUN Allergic reaction ------ no 
 Recurrent infections - no Previous treatment/evaluation includes None  . Cardiac risk factors: family history, stress, post-menopausal. 
 
Past Medical History:  
Diagnosis Date  Advance directive discussed with patient 09/13/2016  
 has info  Endometriosis  GERD (gastroesophageal reflux disease) 07/17/2017 GI eval note Lindsey Gonzalez post visit to ED for atypical chest pains  Menopause D-  Murmur  Reflux esophagitis 07/27/2017 initial eval note 7/27/17 [place] [place] note and 8/1/17 EGD report  Tick bite 06/19/2017 Removed with no problems.  Tongue lesion 4/27/16  
 area removed per note from [facility]  Vertigo  Vitamin D deficiency  Vocal cord dysfunction   
 due to significant allergies Past Surgical History:  
Procedure Laterality Date  ABDOMEN SURGERY PROC UNLISTED  1989  
 cyst removed with right ovary  HX ABDOMINAL LAPAROSCOPY    
 x 2  
 HX COLONOSCOPY  2007  HX COLONOSCOPY  7/26/16  
 10 yr repeat [facility] [place]  HX ENDOSCOPY  08/01/2017 [facility] [place]  path report rec'd  HX HYSTERECTOMY  2004  HX OTHER SURGICAL  4/27/16 path pend  
 lesion on left lateral border of tongue  HX OTHER SURGICAL    
 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    
 HX RIGHT SALPINGO-OOPHORECTOMY  1980's  
 secondary to mass No current facility-administered medications for this encounter. Current Outpatient Medications Medication Sig  cholecalciferol (VITAMIN D3) 1,000 unit tablet Take  by mouth daily.  TURMERIC PO Take  by mouth daily.  aspirin 81 mg chewable tablet Take 81 mg by mouth daily.  metoprolol succinate (TOPROL-XL) 25 mg XL tablet Take 1 Tab by mouth daily.  loratadine (CLARITIN) 10 mg tablet Take 10 mg by mouth daily as needed.  mometasone (NASONEX) 50 mcg/actuation nasal spray 2 Sprays daily as needed.  famotidine (PEPCID) 20 mg tablet Take 20 mg by mouth two (2) times daily as needed. Allergies Allergen Reactions  Morphine Rash Leg rash  Penicillins Hives History reviewed. No pertinent family history. Social History Socioeconomic History  Marital status: SINGLE Spouse name: Not on file  Number of children: Not on file  Years of education: Not on file  Highest education level: Not on file Tobacco Use  Smoking status: Never Smoker  Smokeless tobacco: Never Used Substance and Sexual Activity  Alcohol use: No  
 Drug use: No  
 
 
Objective: 
  Physical Exam 
 
Vitals:  
Vitals:  
 02/08/19 1014 02/08/19 1016 BP: 124/77 Pulse: 67 Resp: 15 Temp: 97.8 °F (36.6 °C) SpO2: 100% Weight:  173 lb (78.5 kg) Height:  5' 10\" (1.778 m) General:    Alert, cooperative, appears anxious, appears stated age. Neck:   Supple Back:     Symmetric Lungs:     Clear to auscultation bilaterally. Heart[de-identified]    Regular rate and rhythm, S1, S2 normal, no murmur, click, rub or gallop. Abdomen:     Soft, non-tender. Bowel sounds normal.   
Extremities:   Extremities normal, atraumatic, no cyanosis or edema. Vascular:   Pulses - +2 Skin:   Skin color normal. No rashes or lesions Neurologic:   CN II-XII grossly intact. Telemetry: normal sinus rhythm ECG:  
EKG Results Procedure 720 Value Units Date/Time EKG 12 LEAD INITIAL [[id number]] Collected:  02/08/19 1002 Order Status:  Completed Updated:  02/08/19 1005 Ventricular Rate 64 BPM   
  Atrial Rate 64 BPM   
  P-R Interval 126 ms   
  QRS Duration 96 ms   
  Q-T Interval 396 ms QTC Calculation (Bezet) 408 ms Calculated P Axis 84 degrees Calculated R Axis 85 degrees Calculated T Axis 63 degrees Diagnosis --  
  Normal sinus rhythm When compared with ECG of 04-FEB-2019 17:31, 
Vent. rate has decreased BY  37 BPM 
  
  
 
normal EKG, normal sinus rhythm Data Review:  
 
Radiology: XR Results (most recent): 
Results from Hospital Encounter encounter on 06/16/17 XR CHEST PA LAT Narrative Exam:  2 view chest 
 
Indication: Shortness of breath and rapid heart rate Comparison to 1/28/2014. PA and lateral views demonstrate normal heart size. There is no acute process in 
the lung fields. The osseous structures are unremarkable. Impression Impression: No acute process or change compared to the prior exam. 
   
 
 
 
Recent Labs 02/08/19 
1018 TROIQ <0.05 Recent Labs 02/08/19 
1018   
K 3.9  CO2 27 BUN 16  
CREA 0.92  
* CA 9.1 No results for input(s): WBC, HGB, HCT, PLT, HGBEXT, HCTEXT, PLTEXT in the last 72 hours. No results for input(s): PTP, INR, GPT, SGOT, AP in the last 72 hours. No lab exists for component: PTTP, INREXT No results for input(s): CHOL, LDLC in the last 72 hours. No lab exists for component: TGL, HDLC,  HBA1C No results for input(s): CRP, TSH, TSHEXT in the last 72 hours. No lab exists for component: ESR Merryl Conquest. TACHO RichardsC Cardiology Attending:Patient seen and examined. I agree with NP assessment and plans. No evidence for recurrent AF, she seems to be symptomatic with each PVC. Also may feel badly on Toprol. OK to release off beta blocker will restrict caffeine. Yanira Armas MD 2/8/2019 4:14 PM  
 
 
Yanira Armas M.D. [facility], [address] [facility] 
   [phone] Layla Fernandez MD

## 2019-02-08 NOTE — ED PROVIDER NOTES
64 y.o. female with past medical history significant for endometriosis, vertigo, and GERD who presents from work via EMS with chief complaint of lightheadedness. Patient was seen here 4 days ago for palpitations x 1 hour with some SOB, with h/o similar episodes recently. Patient was found to be in new onset atrial flutter with RVR, cardiology recommended starting the patient on 50mg toprol XL and 81 mg ASA. Patient had a F/U office visit the following day with Dr. Hortensia Bauman, who changed the patient's medication to 25mg toprol daily, did not think she was a good candidate for long term anticoagulant given her [id number] score of 0. It is noted the patient should have an ECHO done in the next few weeks, and may see Dr. Tonya Beth for possible ablation if her arrhythmia reoccured. Patient reports she skipped her dose of toprol one day this week, however, took her dose yesterday and today. Patient states yesterday she felt normal, without SOB. Patient states this morning, she had a coughing fit, which is fairly normal for her, but notes her SOB continued while getting to work. Patient called and made a cardiology appointment this afternoon to discuss this reoccurrence of sx. At work, patient was sitting talking with coworkers when she suddenly felt lightheaded, flushed, and had a near syncopal event. Patient reports noting a \"pinching\" sensation to her left chest, otherwise denies any significant pain. Per EMS, the patient has been in NSR en route, blood sugar was 156. Patient denies LOC. There are no other acute medical concerns at this time. Social hx: Nonsmoker; No EtOH use PCP: Valentino Sniff, MD 
Cardiologist: Melanie Bosch MD  
 
Note written by Corrinne Alto, Scribe, as dictated by Mary Butt MD 10:13 AM 
 
 
The history is provided by the patient and medical records. No  was used. Past Medical History:  
Diagnosis Date  Advance directive discussed with patient 09/13/2016  
 has info  Endometriosis  GERD (gastroesophageal reflux disease) 07/17/2017 GI eval note Americo Rubinstein post visit to ED for atypical chest pains  Menopause I-  Murmur  Reflux esophagitis 07/27/2017 initial eval note 7/27/17 [facility] note and 8/1/17 EGD report  Tick bite 06/19/2017 Removed with no problems.  Tongue lesion 4/27/16  
 area removed per note from [facility]  Vertigo  Vitamin D deficiency  Vocal cord dysfunction   
 due to significant allergies Past Surgical History:  
Procedure Laterality Date  ABDOMEN SURGERY PROC UNLISTED  1989  
 cyst removed with right ovary  HX ABDOMINAL LAPAROSCOPY    
 x 2  
 HX COLONOSCOPY  2007  HX COLONOSCOPY  7/26/16  
 10 yr repeat [facility]  HX ENDOSCOPY  08/01/2017 [facility]  path report rec'd  HX HYSTERECTOMY  2004  HX OTHER SURGICAL  4/27/16 path pend  
 lesion on left lateral border of tongue  HX OTHER SURGICAL    
 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    
 HX RIGHT SALPINGO-OOPHORECTOMY  1980's  
 secondary to mass History reviewed. No pertinent family history. Social History Socioeconomic History  Marital status: SINGLE Spouse name: Not on file  Number of children: Not on file  Years of education: Not on file  Highest education level: Not on file Social Needs  Financial resource strain: Not on file  Food insecurity - worry: Not on file  Food insecurity - inability: Not on file  Transportation needs - medical: Not on file  Transportation needs - non-medical: Not on file Occupational History  Not on file Tobacco Use  Smoking status: Never Smoker  Smokeless tobacco: Never Used Substance and Sexual Activity  Alcohol use: No  
 Drug use: No  
 Sexual activity: Not on file Other Topics Concern  Not on file Social History Narrative  Not on file ALLERGIES: Morphine and Penicillins Review of Systems Constitutional: Negative for chills and fever. HENT: Negative for ear pain and sore throat. Eyes: Negative for photophobia and pain. Respiratory: Positive for shortness of breath. Negative for chest tightness. Cardiovascular: Positive for chest pain (\"Pinching\"). Negative for leg swelling. Gastrointestinal: Negative for abdominal pain, nausea and vomiting. Genitourinary: Negative for dysuria and flank pain. Musculoskeletal: Negative for back pain and neck pain. Skin: Negative for rash and wound. Neurological: Positive for light-headedness. Negative for dizziness, syncope and headaches. All other systems reviewed and are negative. Vitals:  
 02/08/19 1014 02/08/19 1016 BP: 124/77 Pulse: 67 Resp: 15 Temp: 97.8 °F (36.6 °C) SpO2: 100% Weight:  78.5 kg (173 lb) Height:  5' 10\" (1.778 m) Physical Exam  
Constitutional: She is oriented to person, place, and time. She appears well-developed and well-nourished. No distress. HENT:  
Head: Normocephalic and atraumatic. Mouth/Throat: Oropharynx is clear and moist.  
Eyes: Conjunctivae and EOM are normal.  
Neck: Normal range of motion. Cardiovascular: Normal rate, regular rhythm, normal heart sounds and intact distal pulses. No murmur heard. Pulmonary/Chest: Effort normal and breath sounds normal. No stridor. No respiratory distress. She has no decreased breath sounds. She has no wheezes. Abdominal: Soft. Bowel sounds are normal. There is no tenderness. Musculoskeletal: Normal range of motion. She exhibits no edema or tenderness. Neurological: She is alert and oriented to person, place, and time. No cranial nerve deficit. Skin: Skin is warm and dry. She is not diaphoretic. Psychiatric: She has a normal mood and affect. Nursing note and vitals reviewed. MDM Number of Diagnoses or Management Options Near syncope: Paroxysmal atrial fibrillation [facility]):  
Diagnosis management comments: Near syncope with palpitations - check labs, contact patient's cardiologist for eval and disposition planning. Symptoms c/w either frequent PVC's or paroxysmal A-fib with RVR Amount and/or Complexity of Data Reviewed Clinical lab tests: ordered and reviewed (Neg trop and normal chemistry) Procedures ED EKG interpretation: 
Rhythm: normal sinus rhythm; and regular . Rate (approx.): 64 bpm; Axis: normal; ST/T wave: normal; No STEMI. Note written by Kaitlynn Moura, as dictated by Nicole Blanton MD 10:15 AM 
  
10:37 AM 
Estevan Leyden, PA-C, Cardiology, in the ED to see the patient. 11:09 AM 
Dr. Eleanor Tavares in the ED, to see the patient. 11:19 AM  
Spoke with Dr. Eleanor Tavares, who would like Dr. Cornelio Lu to evaluate the patient in the ED. 12:49 PM 
Spoke with Estevan Leyden, PA-C, who has been in contact with Dr. Cornelio Lu. Plan is for discharge home with outpatient work up and ECHO.  
 
1:06 PM 
Discussed plan and results with the patient, who is agreeable with plan and discharge.

## 2019-02-08 NOTE — ED NOTES
Patient discharged home by ER MD. Patient verbalized understanding of discharge instructions. Patient denies any chest pain or dizziness upon discharge. Patient ambulatory to the waiting room with friend.

## 2019-02-08 NOTE — DISCHARGE INSTRUCTIONS
Patient Education        Learning About Atrial Fibrillation  What is atrial fibrillation? Atrial fibrillation (say \"AY-tree-marly vsd-medp-GUK-shun\") is the most common type of irregular heartbeat (arrhythmia). Normally, the heart beats in a strong, steady rhythm. In atrial fibrillation, a problem with the heart's electrical system causes the two upper parts of the heart (the atria) to quiver, or fibrillate. Your heart rate also may be faster than normal.  Atrial fibrillation can be dangerous because if the heartbeat isn't strong and steady, blood can collect, or pool, in the atria. And pooled blood is more likely to form clots. Clots can travel to the brain, block blood flow, and cause a stroke. Atrial fibrillation can also lead to heart failure. Treatment for atrial fibrillation helps prevent stroke and heart failure. It also helps relieve symptoms. Atrial fibrillation is often caused by another heart problem. It may happen after heart surgery. It may also be caused by other problems, such as an overactive thyroid gland or lung disease. Many people with atrial fibrillation are able to live full and active lives. What are the symptoms? Some people feel symptoms when they have episodes of atrial fibrillation. But other people don't notice any symptoms. If you have symptoms, you may feel:  · A fluttering, racing, or pounding feeling in your chest called palpitations. · Weak or tired. · Dizzy or lightheaded. · Short of breath. · Chest pain. · Confused. You may notice signs of atrial fibrillation when you check your pulse. Your pulse may seem uneven or fast.  What can you expect when you have atrial fibrillation? At first, spells of atrial fibrillation may come on suddenly and last a short time. It may go away on its own or it goes away after treatment. This is called paroxysmal atrial fibrillation. Over time, the spells may last longer and occur more often. They often don't go away on their own.   How is it treated? Treatments can help you feel better and prevent future problems, especially stroke and heart failure. The main types of treatment slow the heart rate, control the heart rhythm, and help prevent stroke. Your treatment will depend on the cause of your atrial fibrillation, your symptoms, and your risk for stroke. · Heart rate treatment. Medicine may be used to slow your heart rate. Your heartbeat may still be irregular. But these medicines keep your heart from beating too fast. They may also help relieve your symptoms. · Heart rhythm treatment. Different treatments may be used to try to stop atrial fibrillation and keep it from returning. They can also relieve symptoms. These treatments include medicine, electrical cardioversion to shock the heart back to a normal rhythm, a procedure called catheter ablation, and heart surgery. · Stroke prevention. You and your doctor can decide how to lower your risk. You may decide to take a blood-thinning medicine, such as aspirin or an anticoagulant. How can you live well with it? You can live well and help manage atrial fibrillation by having a heart-healthy lifestyle. To have a heart-healthy lifestyle:  · Don't smoke. · Eat heart-healthy foods. · Be active. Talk to your doctor about what type and level of exercise is safe for you. · Stay at a healthy weight. Lose weight if you need to. · Manage stress. · Avoid alcohol if it triggers symptoms. · Manage other health problems such as high blood pressure, high cholesterol, and diabetes. · Avoid getting sick from the flu. Get a flu shot every year. Where can you learn more? Go to http://lenny-gage.info/. Enter 702-949-6697 in the search box to learn more about \"Learning About Atrial Fibrillation. \"  Current as of: July 22, 2018  Content Version: 11.9  © 8640-9775 [employer].  Care instructions adapted under license by [employer] (which disclaims liability or warranty for this information). If you have questions about a medical condition or this instruction, always ask your healthcare professional. [place] [address] any warranty or liability for your use of this information.

## 2019-02-09 LAB
ATRIAL RATE: 64 BPM
CALCULATED P AXIS, ECG09: 84 DEGREES
CALCULATED R AXIS, ECG10: 85 DEGREES
CALCULATED T AXIS, ECG11: 63 DEGREES
DIAGNOSIS, 93000: NORMAL
P-R INTERVAL, ECG05: 126 MS
Q-T INTERVAL, ECG07: 396 MS
QRS DURATION, ECG06: 96 MS
QTC CALCULATION (BEZET), ECG08: 408 MS
VENTRICULAR RATE, ECG03: 64 BPM

## 2019-02-11 ENCOUNTER — TELEPHONE (OUTPATIENT)
Dept: CARDIOLOGY CLINIC | Age: 59
End: 2019-02-11

## 2019-02-11 ENCOUNTER — TELEPHONE (OUTPATIENT)
Dept: FAMILY MEDICINE CLINIC | Age: 59
End: 2019-02-11

## 2019-02-11 DIAGNOSIS — K21.9 GASTROESOPHAGEAL REFLUX DISEASE WITHOUT ESOPHAGITIS: Primary | ICD-10-CM

## 2019-02-11 NOTE — TELEPHONE ENCOUNTER
Writer called patient back regarding heart related concerns. Patient did not answer. Writer left  requesting phone call back.

## 2019-02-11 NOTE — TELEPHONE ENCOUNTER
Called patient. Verified patient's identity with two identifiers. Patient discussed her ER visit Friday. She said she did not mention to Dr. Melissa Ramos her hx of hiatal hernia, possible allergies, etc. She said she would like to speak with one of them. I told her I would call her back. Called patient back. I reminded her she can send Dr. Melissa Ramos a [employer] message, but otherwise she needs to discuss GI issues with GI doctor or PCP. She asked if Dr. Melissa Ramos would be able to tell if she has any GI issues or if she should make an appointment with a GI doctor. I told her Dr. Melissa Ramos is a cardiologist so he is checking her heart. I said he most likely will not diagnose any gastrointestinal problems, but will diagnose and/or rule out cardiac problems. Patient said she will wait until Wednesday to discuss further with Dr. Melissa Ramos and denied further questions or concerns.

## 2019-02-11 NOTE — TELEPHONE ENCOUNTER
Pt called in stated that she wanted to talk to Dr. Abeba Velasco. Informed patient that I was a nurse and could help her. She stated that she was in the emergency room twice last week and in the office and she still isnt feeling any better. She stated that she had one of these episodes about 3 years ago and saw a GI doctor who told her that she has a hiatal hernia. Pt also stated that she takes pepcid for acid reflux because that is what helped last time but she is still on pepcid. Pt stated she is unsure if the SOB is from afib or the allergies that she has. Pt would like I call back from Dr. Abeba Velasco or his nurse.

## 2019-02-11 NOTE — TELEPHONE ENCOUNTER
Pt called and would like to talk to Dr Rendell Saint her heart issues.  Best contact number is [phone]

## 2019-02-11 NOTE — TELEPHONE ENCOUNTER
Patient called back into office. Writer spoke with patient. Patient verified . Patient stated that she has been to ER twice for heart related issues and SOB. Has followed up with Cardiology, and has called them as well. Patient states that she may have heartburn. Stated that her symptoms are worse in the AM, started taking Pepcid about 3 years ago when it started, and it does make them better. Saw Dr. Andreas Ashford around that time was told that she does not have acid reflux but was told that she has a slight hiatal hernia. Has slight cramping in her upper ABD. Patient would like to know if she should follow up with Dr. Cecy Knox about that, or if she can be referred to Dr. Arianne Chow since Dr. Andreas Ashford is now retired. Writer stated that message would be sent into Dr. Cecy Knox for recommendations, and she will be given a phone call back once he has done so. Patient verbalized understanding and appreciation.

## 2019-02-12 ENCOUNTER — TELEPHONE (OUTPATIENT)
Dept: FAMILY MEDICINE CLINIC | Age: 59
End: 2019-02-12

## 2019-02-12 NOTE — TELEPHONE ENCOUNTER
----- Message from Tiny Izquierdo sent at 2/12/2019  9:16 AM EST -----  Regarding: Read/Telephone  Pt requesting a call back regarding discussing further treatment options (decline stating more details).  Best [phone]

## 2019-02-12 NOTE — TELEPHONE ENCOUNTER
Writer called patient back regarding referral. Leonor Espana spoke with patient. Patient verified . Patient stated that she just read writer's [employer] message regarding the referral. Patient asked if there was any other information from Dr. Cb Burnham. Writer verbalized that he did not give any other information, just wants her to follow up with Dr. Bigg Arechiga regarding her symptoms. Referral was sent to [facility], and when she is done working on it and has gotten patient an appointment. Patient will be given a phone call. Patient verbalized understanding and appreciation.

## 2019-02-13 ENCOUNTER — OFFICE VISIT (OUTPATIENT)
Dept: CARDIOLOGY CLINIC | Age: 59
End: 2019-02-13

## 2019-02-13 ENCOUNTER — CLINICAL SUPPORT (OUTPATIENT)
Dept: CARDIOLOGY CLINIC | Age: 59
End: 2019-02-13

## 2019-02-13 VITALS
BODY MASS INDEX: 25.17 KG/M2 | OXYGEN SATURATION: 98 % | WEIGHT: 175.8 LBS | HEIGHT: 70 IN | RESPIRATION RATE: 18 BRPM | SYSTOLIC BLOOD PRESSURE: 120 MMHG | DIASTOLIC BLOOD PRESSURE: 72 MMHG | HEART RATE: 75 BPM

## 2019-02-13 DIAGNOSIS — K21.9 GASTROESOPHAGEAL REFLUX DISEASE WITHOUT ESOPHAGITIS: ICD-10-CM

## 2019-02-13 DIAGNOSIS — R00.2 PALPITATIONS: ICD-10-CM

## 2019-02-13 DIAGNOSIS — I48.0 PAROXYSMAL ATRIAL FIBRILLATION (HCC): ICD-10-CM

## 2019-02-13 DIAGNOSIS — I48.0 PAROXYSMAL ATRIAL FIBRILLATION (HCC): Primary | ICD-10-CM

## 2019-02-13 DIAGNOSIS — Z88.9 MULTIPLE ALLERGIES: ICD-10-CM

## 2019-02-13 DIAGNOSIS — T50.905D DRUG SIDE EFFECTS, SUBSEQUENT ENCOUNTER: ICD-10-CM

## 2019-02-13 LAB
ECHO AO ASC DIAM: 2.91 CM
ECHO AO ROOT DIAM: 3.33 CM
ECHO LA AREA 4C: 14.9 CM2
ECHO LA MAJOR AXIS: 3.07 CM
ECHO LA TO AORTIC ROOT RATIO: 0.92
ECHO LA VOL 2C: 31.61 ML (ref 22–52)
ECHO LA VOL 4C: 33.55 ML (ref 22–52)
ECHO LA VOL BP: 38.03 ML (ref 22–52)
ECHO LA VOL/BSA BIPLANE: 19.25 ML/M2 (ref 16–28)
ECHO LA VOLUME INDEX A2C: 16 ML/M2 (ref 16–28)
ECHO LA VOLUME INDEX A4C: 16.98 ML/M2 (ref 16–28)
ECHO LV E' LATERAL VELOCITY: 13.34 CM/S
ECHO LV E' SEPTAL VELOCITY: 9.62 CM/S
ECHO LV INTERNAL DIMENSION DIASTOLIC: 4.43 CM (ref 3.9–5.3)
ECHO LV INTERNAL DIMENSION SYSTOLIC: 2.99 CM
ECHO LV IVSD: 1.14 CM (ref 0.6–0.9)
ECHO LV MASS 2D: 165.2 G (ref 67–162)
ECHO LV MASS INDEX 2D: 83.6 G/M2 (ref 43–95)
ECHO LV POSTERIOR WALL DIASTOLIC: 0.8 CM (ref 0.6–0.9)
ECHO LVOT DIAM: 2.08 CM
ECHO PULMONARY ARTERY SYSTOLIC PRESSURE (PASP): 20 MMHG
ECHO RV INTERNAL DIMENSION: 2.91 CM
ECHO TV REGURGITANT MAX VELOCITY: 203.08 CM/S
ECHO TV REGURGITANT PEAK GRADIENT: 16.5 MMHG

## 2019-02-13 NOTE — PROGRESS NOTES
HISTORY OF PRESENT ILLNESS Andra Adrian is a 64 y.o. female. She recently had an episode of paroxysmal atrial fibrillation which resolved with medical treatment in a rapid fashion. She went to the ER but was seen in the office and started on low dose Toprol XL 25 mg. Plans were made for her to return for an echocardiogram, but she subsequently went back to the ER with palpitations. She was in sinus rhythm but there had been occasional ventricular ectopic beats which she apparently perceived as a pounding. She had been drinking a fair amount of caffeine. It was decided she had side effects from the beta-blocker and could not tolerate it. She felt quite fatigued so the Toprol was stopped. She also runs a low blood pressure. She has restricted her caffeine. She also has symptoms of reflux for which she takes OTC Pepcid. She has seen a gastroenterologist in the past. She prefers a more natural approach with less pharmaceutical medications. An echocardiogram in the office today was normal. She is starting to feel better since her last trip to the ER. HPI Patient Active Problem List  
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  
 Defect, retinal H33.309  Non-allergic rhinitis J31.0  Multiple allergies Z88.9  Osteopenia M85.80  
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  TURMERIC PO Take  by mouth daily.  aspirin 81 mg chewable tablet Take 81 mg by mouth daily.  loratadine (CLARITIN) 10 mg tablet Take 10 mg by mouth daily as needed.  mometasone (NASONEX) 50 mcg/actuation nasal spray 2 Sprays daily as needed.  famotidine (PEPCID) 20 mg tablet Take 20 mg by mouth two (2) times daily as needed. Past Medical History: Diagnosis Date  Advance directive discussed with patient 09/13/2016  
 has info  Endometriosis  GERD (gastroesophageal reflux disease) 07/17/2017 GI eval note [facility] post visit to ED for atypical chest pains  Menopause YNQ-  Murmur  Reflux esophagitis 07/27/2017 initial eval note 7/27/17 [facility] note and 8/1/17 EGD report  Tick bite 06/19/2017 Removed with no problems.  Tongue lesion 4/27/16  
 area removed per note from [facility]  Vertigo  Vitamin D deficiency  Vocal cord dysfunction   
 due to significant allergies Past Surgical History:  
Procedure Laterality Date  ABDOMEN SURGERY PROC UNLISTED  1989  
 cyst removed with right ovary  HX ABDOMINAL LAPAROSCOPY    
 x 2  
 HX COLONOSCOPY  2007  HX COLONOSCOPY  7/26/16  
 10 yr repeat [facility]  HX ENDOSCOPY  08/01/2017 [facility]  path report rec'd  HX HYSTERECTOMY  2004  HX OTHER SURGICAL  4/27/16 path pend  
 lesion on left lateral border of tongue  HX OTHER SURGICAL    
 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    
 HX RIGHT SALPINGO-OOPHORECTOMY  1980's  
 secondary to mass Review of Systems Constitutional: Positive for malaise/fatigue. Cardiovascular: Positive for palpitations. All other systems reviewed and are negative. Visit Vitals /72 (BP 1 Location: Right arm, BP Patient Position: Sitting) Pulse 75 Resp 18 Ht 5' 10\" (1.778 m) Wt 175 lb 12.8 oz (79.7 kg) SpO2 98% BMI 25.22 kg/m² Physical Exam  
Constitutional: She is oriented to person, place, and time. She appears well-nourished. HENT:  
Head: Atraumatic. Eyes: Conjunctivae are normal.  
Neck: Neck supple. Cardiovascular: Normal rate, regular rhythm and normal heart sounds. Exam reveals no gallop and no friction rub. No murmur heard. Pulmonary/Chest: Breath sounds normal. She has no wheezes.   
Abdominal: Bowel sounds are normal.  
 Musculoskeletal: She exhibits no edema. Neurological: She is oriented to person, place, and time. No cranial nerve deficit. Skin: Skin is dry. Psychiatric: Her behavior is normal.  
Nursing note and vitals reviewed. ASSESSMENT and PLAN She remains in sinus rhythm and seems to be doing better. I do not believe she can tolerate even a low dose of beta-blocker. However since this was her first episode of atrial fibrillation, I also do not think she needs to be considered for ablation at this point. I had suggested several natural remedies to her that may improve her reflux as well as her tendency to have atrial and/or ventricular arrhythmias. She is at no risk for coronary disease since she can walk 4 miles without difficulty. She will try these approaches but if the Afib occurs, then she will be referred to our electrophysiologist. I will plan to see her back in follow up in 3 months.

## 2019-02-13 NOTE — PROGRESS NOTES
Chief Complaint Patient presents with  
[facility] Follow Up 1. Have you been to the ER, urgent care clinic since your last visit? Hospitalized since your last visit? No 
 
2. Have you seen or consulted any other health care providers outside of the [address] [place] [address] since your last visit? Include any pap smears or colon screening. No 
 
3) Do you have an Advance Directive on file? no 
 
Patient is accompanied by self I have received verbal consent from Rickey Shannon to discuss any/all medical information while they are present in the room.

## 2019-02-14 ENCOUNTER — TELEPHONE (OUTPATIENT)
Dept: FAMILY MEDICINE CLINIC | Age: 59
End: 2019-02-14

## 2019-02-14 NOTE — TELEPHONE ENCOUNTER
Patient called into the office stating that she has spoken with the nurse a few days ago and was advised that a GI referral would be placed and the referral coordinator would call the patient to inform the patient when the appointment will be for the specialist. Fitz De La Vegach to the patient and stated that I do not see where a conversation was routed to the referral coordinator but a message will be sent today. Informed patient that the referral coordinator will call the specialist office and schedule an appointment and will give the patient a call back. Patient verbally understood. Asked patient if there were any days or times the patient could not do and patient stated no. Patient's call back number is [phone].

## 2019-02-26 ENCOUNTER — TELEPHONE (OUTPATIENT)
Dept: CARDIOLOGY CLINIC | Age: 59
End: 2019-02-26

## 2019-02-28 ENCOUNTER — CLINICAL SUPPORT (OUTPATIENT)
Dept: CARDIOLOGY CLINIC | Age: 59
End: 2019-02-28

## 2019-02-28 DIAGNOSIS — I49.9 IRREGULAR HEART BEATS: ICD-10-CM

## 2019-03-08 ENCOUNTER — TELEPHONE (OUTPATIENT)
Dept: CARDIOLOGY CLINIC | Age: 59
End: 2019-03-08

## 2019-03-25 NOTE — TELEPHONE ENCOUNTER
Dr. Bryce Garcia said monitor showed some fast and some slow beats, but nothing dangerous. Patient has an appointment with Dr Emmy Torres Thursday and report was given to his nurse. Mogi message was sent to patient with result note.

## 2019-03-28 ENCOUNTER — OFFICE VISIT (OUTPATIENT)
Dept: CARDIOLOGY CLINIC | Age: 59
End: 2019-03-28

## 2019-03-28 VITALS
RESPIRATION RATE: 16 BRPM | BODY MASS INDEX: 24.48 KG/M2 | HEIGHT: 70 IN | HEART RATE: 81 BPM | SYSTOLIC BLOOD PRESSURE: 110 MMHG | OXYGEN SATURATION: 98 % | DIASTOLIC BLOOD PRESSURE: 60 MMHG | WEIGHT: 171 LBS

## 2019-03-28 DIAGNOSIS — I49.9 IRREGULAR HEART BEATS: ICD-10-CM

## 2019-03-28 DIAGNOSIS — K21.9 GASTROESOPHAGEAL REFLUX DISEASE WITHOUT ESOPHAGITIS: ICD-10-CM

## 2019-03-28 DIAGNOSIS — R00.2 PALPITATIONS: ICD-10-CM

## 2019-03-28 DIAGNOSIS — I48.0 PAROXYSMAL ATRIAL FIBRILLATION (HCC): Primary | ICD-10-CM

## 2019-03-28 RX ORDER — OMEPRAZOLE 20 MG/1
10 TABLET, DELAYED RELEASE ORAL DAILY
COMMUNITY
End: 2020-04-16

## 2019-03-29 NOTE — PROGRESS NOTES
Cardiac Electrophysiology OFFICE Consultation Note     Subjective:      Jeanette Samuel is a 64 y.o. patient who is seen for evaluation of Paroxysmal atrial fibrillation. She is kindly referred from Dr Paco Velasquez  The patient also has been treated for [facility] and PVCs. She said that she cannot tolerate much of the Prilosec for her acid reflux. In the past, she felt like acid reflux had triggered more of her palpitations and therefore she underwent EGD, but said there was only mild hiatal hernia. The patient feels like if she takes half a tablet of Prilosec, it controlled her symptoms better. She does not like to take the Toprol regularly. She takes it as needed. So far, she thinks that her symptoms have been improved. Echo 3/2019 normal LVEF and atria. 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  cholecalciferol (VITAMIN D3) 1,000 unit tablet Take  by mouth daily.  TURMERIC PO Take  by mouth daily.  loratadine (CLARITIN) 10 mg tablet Take 10 mg by mouth daily as needed.  mometasone (NASONEX) 50 mcg/actuation nasal spray 2 Sprays daily as needed.        Allergies   Allergen Reactions    Morphine Rash     Leg rash    Penicillins Hives     Past Medical History:   Diagnosis Date    Advance directive discussed with patient 09/13/2016    has info    Endometriosis     GERD (gastroesophageal reflux disease) 07/17/2017    GI eval note [place] post visit to ED for atypical chest pains    Menopause     LMP-    Murmur     Reflux esophagitis 07/27/2017 initial eval note    7/27/17 [place] [facility] note and 8/1/17 EGD report    Tick bite 06/19/2017    Removed with no problems.  Tongue lesion 4/27/16    [place] [facility]    Vertigo     Vitamin D deficiency     Vocal cord dysfunction     due to significant allergies     Past Surgical History:   Procedure Laterality Date    ABDOMEN SURGERY PROC UNLISTED  1989    cyst removed with right ovary    HX ABDOMINAL LAPAROSCOPY      x 2    HX COLONOSCOPY  2007    HX COLONOSCOPY  7/26/16    10 yr repeat [facility]    HX ENDOSCOPY  08/01/2017    [facility]  path report rec'd    HX HYSTERECTOMY  2004    HX OTHER SURGICAL  4/27/16 path pend    lesion on left lateral border of tongue    HX OTHER SURGICAL      basal cell face-2005(in [facility])    HX RETINAL DETACHMENT REPAIR      HX RIGHT SALPINGO-OOPHORECTOMY  1980's    secondary to mass     History reviewed. No arrhythmia or sudden death in family history. Social History     Tobacco Use    Smoking status: Never Smoker    Smokeless tobacco: Never Used   Substance Use Topics    Alcohol use: No        Review of Systems:   Constitutional: Negative for fever, chills, weight loss, malaise/fatigue. HEENT: Negative for nosebleeds, vision changes. Respiratory: Negative for cough, hemoptysis  Cardiovascular: Negative for chest pain, + palpitations, no orthopnea, claudication, leg swelling, syncope, and PND. Gastrointestinal: Negative for nausea, vomiting, diarrhea, blood in stool and melena. +GERD  Genitourinary: Negative for dysuria, and hematuria. Musculoskeletal: Negative for myalgias, arthralgia. Skin: Negative for rash. Heme: Does not bleed or bruise easily.    Neurological: Negative for speech change and focal weakness     Objective:     Visit Vitals  /60 (BP 1 Location: Left arm, BP Patient Position: Sitting)   Pulse 81   Resp 16   Ht 5' 10\" (1.778 m)   Wt 171 lb (77.6 kg)   SpO2 98%   BMI 24.54 kg/m²      Physical Exam: Constitutional: well-developed and well-nourished. No respiratory distress. Head: Normocephalic and atraumatic. Eyes: Pupils are equal, round  ENT: hearing normal  Neck: supple. No JVD present. Cardiovascular: Normal rate, regular rhythm. Exam reveals no gallop and no friction rub. No murmur heard. Pulmonary/Chest: Effort normal and breath sounds normal. No wheezes. Abdominal: Soft, no tenderness. Musculoskeletal: no edema. Neurological: alert, oriented. Skin: Skin is warm and dry  Psychiatric: normal mood and affect. Behavior is normal. Judgment and thought content normal.      Assessment/Plan:       ICD-10-CM ICD-9-CM    1. Paroxysmal atrial fibrillation (HCC) I48.0 427.31    2. Gastroesophageal reflux disease without esophagitis K21.9 530.81    3. Palpitations R00.2 785.1    4. Irregular heart beats I49.9 427.9      reviewed diet, exercise and weight control  reviewed medications and side effects in detail   1/2 dose prilosec causes less palpitation to her  holter 3/4/2019 11 PVC, 16 PAC  HR  bpm  Hx of PAF  Thought toprol could be side effects before but now she said she can use it  If her symptoms are not controllable by beta-blocker, we may have to consider antiarrhythmic medications, such as Multaq or Amiodarone or Tikosyn. She can also consider Sotalol. However I do think sinus bradycardia may cause problem with dizziness and fatigue  Otherwise, EP study and ablation would be indicated. The patient said she is not ready for an ablation at this time. We talked about it    Future Appointments   Date Time Provider Albin Amaral   5/15/2019  9:20 AM Clinton Leal  [address]     Call me if symptoms are worse again or if she cannot tolerate beta blocker      Thank you for involving me in this patient's care and please call with further concerns or questions. Jackeline Smith M.D.   Electrophysiology/Cardiology  [address] [place] [address] , [facility]  [address] [facility], [address]                             [address] [place] [address]  [phone]

## 2019-11-11 ENCOUNTER — OFFICE VISIT (OUTPATIENT)
Dept: FAMILY MEDICINE CLINIC | Age: 59
End: 2019-11-11

## 2019-11-11 VITALS
HEIGHT: 70 IN | OXYGEN SATURATION: 100 % | DIASTOLIC BLOOD PRESSURE: 65 MMHG | TEMPERATURE: 97.5 F | SYSTOLIC BLOOD PRESSURE: 111 MMHG | WEIGHT: 173 LBS | HEART RATE: 75 BPM | BODY MASS INDEX: 24.77 KG/M2 | RESPIRATION RATE: 16 BRPM

## 2019-11-11 DIAGNOSIS — J06.9 VIRAL UPPER RESPIRATORY TRACT INFECTION: ICD-10-CM

## 2019-11-11 DIAGNOSIS — Z12.31 BREAST CANCER SCREENING BY MAMMOGRAM: ICD-10-CM

## 2019-11-11 DIAGNOSIS — D17.21 LIPOMA OF RIGHT UPPER EXTREMITY: ICD-10-CM

## 2019-11-11 DIAGNOSIS — Z23 NEED FOR SHINGLES VACCINE: ICD-10-CM

## 2019-11-11 DIAGNOSIS — Z00.00 WELL WOMAN EXAM (NO GYNECOLOGICAL EXAM): Primary | ICD-10-CM

## 2019-11-11 DIAGNOSIS — M25.512 ACUTE PAIN OF BOTH SHOULDERS: ICD-10-CM

## 2019-11-11 DIAGNOSIS — M25.511 ACUTE PAIN OF BOTH SHOULDERS: ICD-10-CM

## 2019-11-11 DIAGNOSIS — M79.641 PAIN IN BOTH HANDS: ICD-10-CM

## 2019-11-11 DIAGNOSIS — I48.91 ATRIAL FIBRILLATION, UNSPECIFIED TYPE (HCC): ICD-10-CM

## 2019-11-11 DIAGNOSIS — M79.642 PAIN IN BOTH HANDS: ICD-10-CM

## 2019-11-11 NOTE — PATIENT INSTRUCTIONS
Have fasting labs done at [facility] in about 2 weeks (when better from the cold). Schedule a nurse visit for routine flu shot and tetanus update, again in about 2 weeks once over your cold. Have hand xray done at a [address] You should get a call to schedule your mammogram, the scheduling number if you need it is [phone]. Schedule to see your cardiologist about the intermittent Afib, either Dr. Abigail Arzola or Dr. Carmen Mcdermott. Well Visit, Women 48 to 72: Care Instructions  Your Care Instructions    Physical exams can help you stay healthy. Your doctor has checked your overall health and may have suggested ways to take good care of yourself. He or she also may have recommended tests. At home, you can help prevent illness with healthy eating, regular exercise, and other steps. Follow-up care is a key part of your treatment and safety. Be sure to make and go to all appointments, and call your doctor if you are having problems. It's also a good idea to know your test results and keep a list of the medicines you take. How can you care for yourself at home? · Reach and stay at a healthy weight. This will lower your risk for many problems, such as obesity, diabetes, heart disease, and high blood pressure. · Get at least 30 minutes of exercise on most days of the week. Walking is a good choice. You also may want to do other activities, such as running, swimming, cycling, or playing tennis or team sports. · Do not smoke. Smoking can make health problems worse. If you need help quitting, talk to your doctor about stop-smoking programs and medicines. These can increase your chances of quitting for good. · Protect your skin from too much sun. When you're outdoors from 10 a.m. to 4 p.m., stay in the shade or cover up with clothing and a hat with a wide brim. Wear sunglasses that block UV rays. Even when it's cloudy, put broad-spectrum sunscreen (SPF 30 or higher) on any exposed skin.   · See a dentist one or two times a year for checkups and to have your teeth cleaned. · Wear a seat belt in the car. Follow your doctor's advice about when to have certain tests. These tests can spot problems early. · Cholesterol. Your doctor will tell you how often to have this done based on your age, family history, or other things that can increase your risk for heart attack and stroke. · Blood pressure. Have your blood pressure checked during a routine doctor visit. Your doctor will tell you how often to check your blood pressure based on your age, your blood pressure results, and other factors. · Mammogram. Ask your doctor how often you should have a mammogram, which is an X-ray of your breasts. A mammogram can spot breast cancer before it can be felt and when it is easiest to treat. · Pap test and pelvic exam. Ask your doctor how often you should have a Pap test. You may not need to have a Pap test as often as you used to. · Vision. Have your eyes checked every year or two or as often as your doctor suggests. Some experts recommend that you have yearly exams for glaucoma and other age-related eye problems starting at age 48. · Hearing. Tell your doctor if you notice any change in your hearing. You can have tests to find out how well you hear. · Diabetes. Ask your doctor whether you should have tests for diabetes. · Colorectal cancer. Your risk for colorectal cancer gets higher as you get older. Some experts say that adults should start regular screening at age 48 and stop at age 76. Others say to start before age 48 or continue after age 76. Talk with your doctor about your risk and when to start and stop screening. · Thyroid disease. Talk to your doctor about whether to have your thyroid checked as part of a regular physical exam. Women have an increased chance of a thyroid problem. · Osteoporosis. You should begin tests for bone density at age 72.  If you are younger than 72, ask your doctor whether you have factors that may increase your risk for this disease. You may want to have this test before age 72. · Heart attack and stroke risk. At least every 4 to 6 years, you should have your risk for heart attack and stroke assessed. Your doctor uses factors such as your age, blood pressure, cholesterol, and whether you smoke or have diabetes to show what your risk for a heart attack or stroke is over the next 10 years. When should you call for help? Watch closely for changes in your health, and be sure to contact your doctor if you have any problems or symptoms that concern you. Where can you learn more? Go to http://lennySiriusXM Canadagage.info/. Enter G505 in the search box to learn more about \"Well Visit, Women 50 to 72: Care Instructions. \"  Current as of: December 13, 2018  Content Version: 12.2  © 9295-4059 [employer]. Care instructions adapted under license by [employer] (which disclaims liability or warranty for this information). If you have questions about a medical condition or this instruction, always ask your healthcare professional. [place] [address] any warranty or liability for your use of this information. Learning About the [address] Diet  What is the Mediterranean diet? The Mediterranean diet is a style of eating rather than a diet plan. It features foods eaten in [place], [place], [place] and [place], and other countries along the [facility]. It emphasizes eating foods like fish, fruits, vegetables, beans, high-fiber breads and whole grains, nuts, and olive oil. This style of eating includes limited red meat, cheese, and sweets. Why choose the Mediterranean diet? A Mediterranean-style diet may improve heart health. It contains more fat than other heart-healthy diets.  But the fats are mainly from nuts, unsaturated oils (such as fish oils and olive oil), and certain nut or seed oils (such as canola, soybean, or flaxseed oil). These fats may help protect the heart and blood vessels. How can you get started on the Mediterranean diet? Here are some things you can do to switch to a more Mediterranean way of eating. What to eat  · Eat a variety of fruits and vegetables each day, such as grapes, blueberries, tomatoes, broccoli, peppers, figs, olives, spinach, eggplant, beans, lentils, and chickpeas. · Eat a variety of whole-grain foods each day, such as oats, brown rice, and whole wheat bread, pasta, and couscous. · Eat fish at least 2 times a week. Try tuna, salmon, mackerel, lake trout, herring, or sardines. · Eat moderate amounts of low-fat dairy products, such as milk, cheese, or yogurt. · Eat moderate amounts of poultry and eggs. · Choose healthy (unsaturated) fats, such as nuts, olive oil, and certain nut or seed oils like canola, soybean, and flaxseed. · Limit unhealthy (saturated) fats, such as butter, palm oil, and coconut oil. And limit fats found in animal products, such as meat and dairy products made with whole milk. Try to eat red meat only a few times a month in very small amounts. · Limit sweets and desserts to only a few times a week. This includes sugar-sweetened drinks like soda. The Mediterranean diet may also include red wine with your meal--1 glass each day for women and up to 2 glasses a day for men. Tips for eating at home  · Use herbs, spices, garlic, lemon zest, and citrus juice instead of salt to add flavor to foods. · Add avocado slices to your sandwich instead of dumas. · Have fish for lunch or dinner instead of red meat. Brush the fish with olive oil, and broil or grill it. · Sprinkle your salad with seeds or nuts instead of cheese. · Cook with olive or canola oil instead of butter or oils that are high in saturated fat. · Switch from 2% milk or whole milk to 1% or fat-free milk.   · Dip raw vegetables in a vinaigrette dressing or hummus instead of dips made from mayonnaise or sour cream.  · Have a piece of fruit for dessert instead of a piece of cake. Try baked apples, or have some dried fruit. Tips for eating out  · Try broiled, grilled, baked, or poached fish instead of having it fried or breaded. · Ask your  to have your meals prepared with olive oil instead of butter. · Order dishes made with marinara sauce or sauces made from olive oil. Avoid sauces made from cream or mayonnaise. · Choose whole-grain breads, whole wheat pasta and pizza crust, brown rice, beans, and lentils. · Cut back on butter or margarine on bread. Instead, you can dip your bread in a small amount of olive oil. · Ask for a side salad or grilled vegetables instead of french fries or chips. Where can you learn more? Go to http://lenny-gage.info/. Enter 104-297-8800 in the search box to learn more about \"Learning About the Mediterranean Diet. \"  Current as of: November 7, 2018  Content Version: 12.2  © 8999-0256 [employer]. Care instructions adapted under license by [employer] (which disclaims liability or warranty for this information). If you have questions about a medical condition or this instruction, always ask your healthcare professional. [address] any warranty or liability for your use of this information.

## 2019-11-11 NOTE — PROGRESS NOTES
Subjective:   64 y.o. female for Well Woman Check. Her gyne and breast care is done elsewhere by her Ob-Gyne physician. She normally sees Dr. Mohan Bradshaw and is new to me. Health Maintenance   Topic Date Due    DTaP/Tdap/Td series (1 - Tdap) 01/27/1983    Shingrix Vaccine Age 50> (1 of 2) 01/27/2012    Influenza Age 5 to Adult  08/01/2019    BREAST CANCER SCRN MAMMOGRAM  10/23/2020    COLONOSCOPY  07/26/2026    Hepatitis C Screening  Completed    Pneumococcal 0-64 years  Aged Out       She has PAF, and over the last 2 weeks, she has been feeling some \"A-fib sensations\", as in palpitations, lasting a few seconds. She sees cardiology Dr Taylor Hubre and Dr. Susanna Millard. She uses metoprolol 25 mg, but she felt more palpitations when on it daily, so she only takes 1/2 tab if she feels Afib lasting a couple minutes. She has had a cold since last week, so she takes her claritin and ASA OTC remedies that she can tolerate. She knows to avoid decongestants and can't tolerate Allegra or Zyrtec. She retired in June 2019 from being a  working in [address] [place] [address]. She had bilateral shoulder pain that she attributed to her positioning at her desk all day working at computer. She sees a chiropractor/massage therapist who felt a lump on her right arm that may be a fatty cyst, but suggested that she have her inflammatory markers checked, an \"extended thyroid panel\" and \"extended cholesterol panel\". She c/o left 3rd PIP is often painful and swollen, had a flare a few weeks ago that lasted about 1 week. No redness or warmth. Denies h/o gout, but has had a randomly painful and swollen right knee one time a few years ago without injury, states was checked for gout then and told she didn't have it. 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 This is very physically active and may have aggravated her shoulders.     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 Metoprolol Succ. ER 25 mg. Patient reports taking PRN.  omeprazole (PRILOSEC OTC) 20 mg tablet Take 20 mg by mouth daily.  cholecalciferol (VITAMIN D3) 1,000 unit tablet Take  by mouth daily.  loratadine (CLARITIN) 10 mg tablet Take 10 mg by mouth daily as needed.  mometasone (NASONEX) 50 mcg/actuation nasal spray 2 Sprays daily as needed.  TURMERIC PO Take  by mouth daily. Allergies   Allergen Reactions    Morphine Rash     Leg rash    Penicillins Hives     Past Medical History:   Diagnosis Date    Advance directive discussed with patient 09/13/2016    has info    Endometriosis     GERD (gastroesophageal reflux disease) 07/17/2017    GI eval note Deepa Rouse post visit to ED for atypical chest pains    Menopause     LMP-    Murmur     Reflux esophagitis 07/27/2017 initial eval note    7/27/17 Vanessa Heritage note and 8/1/17 EGD report    Tick bite 06/19/2017    Removed with no problems.     Tongue lesion 4/27/16    [place] [facility]    Vertigo     Vitamin D deficiency     Vocal cord dysfunction     due to significant allergies     Past Surgical History:   Procedure Laterality Date    ABDOMEN SURGERY PROC UNLISTED  1989    cyst removed with right ovary    HX ABDOMINAL LAPAROSCOPY      x 2    HX COLONOSCOPY  2007    HX COLONOSCOPY  7/26/16    10 yr repeat Breanne Revel HX ENDOSCOPY  08/01/2017    Ángela Hurley  path report rec'd    HX HYSTERECTOMY  2004    HX OTHER SURGICAL  4/27/16 path pend    lesion on left lateral border of tongue    HX OTHER SURGICAL      basal cell face-2005(in [facility])    HX RETINAL DETACHMENT REPAIR      HX RIGHT SALPINGO-OOPHORECTOMY  1980's    secondary to mass     History reviewed. No pertinent family history.   Social History     Tobacco Use    Smoking status: Never Smoker    Smokeless tobacco: Never Used   Substance Use Topics    Alcohol use: No        Lab Results   Component Value Date/Time    WBC 7.3 02/04/2019 05:12 PM    HGB 13.5 02/04/2019 05:12 PM    HCT 42.0 02/04/2019 05:12 PM    PLATELET 820 95/93/4672 05:12 PM    MCV 94.2 02/04/2019 05:12 PM     Lab Results   Component Value Date/Time    Cholesterol, total 205 (H) 09/01/2016 07:58 AM    HDL Cholesterol 81 09/01/2016 07:58 AM    LDL, calculated 115 (H) 09/01/2016 07:58 AM    Triglyceride 47 09/01/2016 07:58 AM     Lab Results   Component Value Date/Time    TSH 2.67 02/04/2019 05:12 PM      Lab Results   Component Value Date/Time    Sodium 143 02/08/2019 10:18 AM    Potassium 3.9 02/08/2019 10:18 AM    Chloride 107 02/08/2019 10:18 AM    CO2 27 02/08/2019 10:18 AM    Anion gap 9 02/08/2019 10:18 AM    Glucose 123 (H) 02/08/2019 10:18 AM    BUN 16 02/08/2019 10:18 AM    Creatinine 0.92 02/08/2019 10:18 AM    BUN/Creatinine ratio 17 02/08/2019 10:18 AM    GFR est AA >60 02/08/2019 10:18 AM    GFR est non-AA >60 02/08/2019 10:18 AM    Calcium 9.1 02/08/2019 10:18 AM      Lab Results   Component Value Date/Time    Sodium 143 02/08/2019 10:18 AM    Potassium 3.9 02/08/2019 10:18 AM    Chloride 107 02/08/2019 10:18 AM    CO2 27 02/08/2019 10:18 AM    Anion gap 9 02/08/2019 10:18 AM    Glucose 123 (H) 02/08/2019 10:18 AM    BUN 16 02/08/2019 10:18 AM    Creatinine 0.92 02/08/2019 10:18 AM    BUN/Creatinine ratio 17 02/08/2019 10:18 AM    GFR est AA >60 02/08/2019 10:18 AM    GFR est non-AA >60 02/08/2019 10:18 AM    Calcium 9.1 02/08/2019 10:18 AM    Bilirubin, total 0.2 02/04/2019 05:12 PM    ALT (SGPT) 34 02/04/2019 05:12 PM    AST (SGOT) 25 02/04/2019 05:12 PM    Alk. phosphatase 88 02/04/2019 05:12 PM    Protein, total 7.6 02/04/2019 05:12 PM    Albumin 3.8 02/04/2019 05:12 PM    Globulin 3.8 02/04/2019 05:12 PM    A-G Ratio 1.0 (L) 02/04/2019 05:12 PM      No results found for: HBA1C, UFL0VUTZ, HGBE8, HJD6HYLY, UBJ1CAKS      ROS: Feeling generally well. No TIA's or unusual headaches, no dysphagia. No prolonged cough. No dyspnea or chest pain on exertion. No abdominal pain, change in bowel habits, black or bloody stools. No urinary tract symptoms. No new or unusual musculoskeletal symptoms. Objective: The patient appears well, alert, oriented x 3, in no distress. Visit Vitals  /65 (BP 1 Location: Left arm, BP Patient Position: Sitting)   Pulse 75   Temp 97.5 °F (36.4 °C) (Oral)   Resp 16   Ht 5' 10\" (1.778 m)   Wt 173 lb (78.5 kg)   SpO2 100%   BMI 24.82 kg/m²     ENT normal.  Neck supple. No adenopathy or thyromegaly. ANNE. Lungs are clear, good air entry, no wheezes, rhonchi or rales. S1 and S2 normal, no murmurs, regular rate and rhythm. Abdomen soft without tenderness, guarding, mass or organomegaly. Extremities show no edema, normal peripheral pulses. Bilateral shoulders with slight tenderness posteriorly, but comfortable FROM and strength intact 5/5 B. Bilateral hands with Bouchards nodes. Left hand 3rd PIP tender and swollen. Neurological is normal, no focal findings. Skin: Lipoma right upper arm and right thigh  Breast and Pelvic exams are deferred. EKG: NSR, no acute changes, no significant change from 2/08/2019    Assessment/Plan:       ICD-10-CM ICD-9-CM    1.  Well woman exam (no gynecological exam) - recovering from URI, return in 2 weeks for flu vaccine and Tdap; RX given for Shingrix and mammogram, she is up to date on colonscopy, she sees gyn for pap and breast exams B46.68 O94.8 METABOLIC PANEL, COMPREHENSIVE      CBC WITH AUTOMATED DIFF      VITAMIN D, 25 HYDROXY      LIPID PANEL      TSH 3RD GENERATION      UA WITH REFLEX MICRO AND CULTURE      HEMOGLOBIN A1C WITH EAG   2. Atrial fibrillation, unspecified type ([facility] [address] - on metoprolol, followed by cardiology, has noted an increase in palpitations over the last 2 weeks, EKG showing NSR and without change today, will check labs, I have advised her to follow up with cardiology                     I48.91 427.31 AMB POC EKG ROUTINE W/ 12 LEADS, INTER & REP      METABOLIC PANEL, COMPREHENSIVE      CBC WITH AUTOMATED DIFF      TSH 3RD GENERATION      T4, FREE   3. Need for shingles vaccine - RX given Z23 V04.89 varicella-zoster recombinant, PF, (SHINGRIX, PF,) 50 mcg/0.5 mL susr injection   4. Lipoma of right upper extremity D17.21 214.8    5. Pain in both hands - left 3rd PIP swollen, reports intermittent flares, had had flare of knee pain and swelling without injury; current bilateral shoulder pain likely from overuse; will check inflammatory markers, serologies and radiographs B98.624 086.3 METABOLIC PANEL, COMPREHENSIVE    M79.642  CBC WITH AUTOMATED DIFF      CYCLIC CITRUL PEPTIDE AB, IGG      RHEUMATOID FACTOR, QL      SED RATE (ESR)      C REACTIVE PROTEIN, QT      URIC ACID      XR HAND LT MIN 3 V   6. Breast cancer screening by mammogram Z12.31 V76.12 [facility] MAMMO BI SCREENING INCL CAD   7. Viral upper respiratory tract infection - resolving, conitnue symptomatic treatment J06.9 465.9      8. Acute pain of both shoulders - acute on chronic, likely recently exacerbated by [place]; rest, perform ROM exercises M25.511 719.41     M25.512       Have fasting labs done at [facility] in about 2 weeks (when better from the cold). Schedule a nurse visit for routine flu shot and tetanus update, again in about 2 weeks once over your cold. Have hand xray done at a [address] You should get a call to schedule your mammogram, the scheduling number if you need it is [phone].     Schedule to see your cardiologist about the intermittent Afib, either Dr. Tolu Turner or Dr. Shelley Mallory.

## 2019-11-11 NOTE — PROGRESS NOTES
1. Have you been to the ER, urgent care clinic since your last visit? Hospitalized since your last visit? Yes When: 2/2019 Where: [facility] Reason for visit: Dizziness, SOB    2. Have you seen or consulted any other health care providers outside of the [address] [place] [address] Pedro since your last visit? Include any pap smears or colon screening. No    Chief Complaint   Patient presents with    Irregular Heart Beat     65 y/o reports on and off. Patient denies any chest pain at this time. Patient reports currently taking beta-blocker to control A-Fib. Metoprolol.      Cyst     Right arm    Annual Wellness Visit       Visit Vitals  /65 (BP 1 Location: Left arm, BP Patient Position: Sitting)   Pulse 75   Temp 97.5 °F (36.4 °C) (Oral)   Resp 16   Ht 5' 10\" (1.778 m)   Wt 173 lb (78.5 kg)   SpO2 100%   BMI 24.82 kg/m²

## 2019-11-12 DIAGNOSIS — M79.641 PAIN IN BOTH HANDS: ICD-10-CM

## 2019-11-12 DIAGNOSIS — M79.642 PAIN IN BOTH HANDS: ICD-10-CM

## 2019-11-12 DIAGNOSIS — I48.91 ATRIAL FIBRILLATION, UNSPECIFIED TYPE (HCC): ICD-10-CM

## 2019-11-12 DIAGNOSIS — Z00.00 WELL WOMAN EXAM (NO GYNECOLOGICAL EXAM): ICD-10-CM

## 2019-11-16 ENCOUNTER — DOCUMENTATION ONLY (OUTPATIENT)
Dept: CARDIOLOGY CLINIC | Age: 59
End: 2019-11-16

## 2019-11-16 NOTE — PROGRESS NOTES
afib again per her.   She called me while I am on call Saturday  She takes toprol prn   prilosec causes afib to her  She takes pepcid and now says she has dizziness but not noticing low blood pressure    I explained to her that GERD can trigger asthma and AFIB and prilosec may not control her GERD and it seems she needs to see Dr Govind Rodriguez and GI to discuss about different treatment  She seems to not tolerate PRN toprol due to either low bp and or low HR so I recommend her make appt to discuss ablation with me

## 2019-11-18 NOTE — PROGRESS NOTES
[employer] message sent to patient with MD recommendations and possible follow up appointment date and time.

## 2019-11-19 ENCOUNTER — TELEPHONE (OUTPATIENT)
Dept: CARDIOLOGY CLINIC | Age: 59
End: 2019-11-19

## 2019-11-19 RX ORDER — PROPAFENONE HYDROCHLORIDE 150 MG/1
150 TABLET, FILM COATED ORAL 2 TIMES DAILY
Qty: 60 TAB | Refills: 1 | Status: SHIPPED | OUTPATIENT
Start: 2019-11-19 | End: 2020-03-04

## 2019-11-19 RX ORDER — PROPAFENONE HYDROCHLORIDE 150 MG/1
150 TABLET, FILM COATED ORAL EVERY 8 HOURS
Qty: 60 TAB | Refills: 1 | Status: SHIPPED | OUTPATIENT
Start: 2019-11-19 | End: 2019-11-19

## 2019-11-19 NOTE — TELEPHONE ENCOUNTER
Pt called Stating she has been in Afib since this morning. Pt stated that HR is 92 bpm.Pt stated she took Half a toprol this morning when she it started and took the other half on the while on the phone. Pt stated she would like to know if she should go to the ER. Notified pt that Dr. Dina Beckman will probably put you on an antiarrythmic medication until you have ablation done.  Will ask MD/NP for recommendations

## 2019-11-19 NOTE — TELEPHONE ENCOUNTER
Called Pt. Two patient identifiers confirmed. Notified pt about taking Propafenone 150 mg BID. Scheduled Pt for EP study and ablation on 1/29/2020  Pt stated she is concerned about a blood clot and is wondering if she should take a baby Aspirin.  Will ask MD/NP

## 2019-11-19 NOTE — TELEPHONE ENCOUNTER
Cardiologist: Dr. Edie Gong    Last appt: 3/28/2019  Future Appointments   Date Time Provider Albin Lagunai   11/20/2019 10:00 AM [address]   11/25/2019  9:30 AM Magda Palomino PA-C [facility] SCHED       Requested Prescriptions     Signed Prescriptions Disp Refills    propafenone (RYTHMOL) 150 mg tablet 60 Tab 1     Sig: Take 1 Tab by mouth two (2) times a day. Authorizing Provider: LIDIA MCCLENDON     Ordering User: Roxanna Tyson per Dr. Edie Gong.

## 2019-11-20 ENCOUNTER — HOSPITAL ENCOUNTER (OUTPATIENT)
Dept: MAMMOGRAPHY | Age: 59
Discharge: HOME | End: 2019-11-20
Payer: COMMERCIAL

## 2019-11-20 DIAGNOSIS — Z12.31 VISIT FOR SCREENING MAMMOGRAM: ICD-10-CM

## 2019-11-20 PROCEDURE — 77063 BREAST TOMOSYNTHESIS BI: CPT

## 2019-11-20 NOTE — TELEPHONE ENCOUNTER
Patient states her pharmacy did not get prescription for propafenone until today. She states she picked it up but saw a warning on the bottle that it could cause irregular heart beat and that she might need to be monitored when taking this just in case she needs emergency treatment. She would also like to know why this medication wasn't offered earlier if it is what she needed? Please advise.      Phone:  [phone]

## 2019-11-20 NOTE — TELEPHONE ENCOUNTER
Called Pt. Two patient identifiers confirmed. Notified pt about what the medication should do for her. Pt stated she would like to know if this medication is really what Dr. Anne Braun would like her to be on. Notified Pt about what Dr. Anne Braun had stated in last note and scheduled pt to be seen on 11/21/2019 @11:40 to discuss medication. Pt verbalized understanding of information discussed w/ no further questions at this time.

## 2019-11-21 ENCOUNTER — OFFICE VISIT (OUTPATIENT)
Dept: CARDIOLOGY CLINIC | Age: 59
End: 2019-11-21

## 2019-11-21 VITALS
WEIGHT: 172 LBS | BODY MASS INDEX: 24.62 KG/M2 | RESPIRATION RATE: 16 BRPM | SYSTOLIC BLOOD PRESSURE: 100 MMHG | HEART RATE: 69 BPM | OXYGEN SATURATION: 98 % | DIASTOLIC BLOOD PRESSURE: 60 MMHG | HEIGHT: 70 IN

## 2019-11-21 DIAGNOSIS — Z01.812 PRE-PROCEDURE LAB EXAM: ICD-10-CM

## 2019-11-21 DIAGNOSIS — R00.2 PALPITATIONS: ICD-10-CM

## 2019-11-21 DIAGNOSIS — I48.0 PAROXYSMAL ATRIAL FIBRILLATION (HCC): Primary | ICD-10-CM

## 2019-11-21 DIAGNOSIS — K21.9 GASTROESOPHAGEAL REFLUX DISEASE WITHOUT ESOPHAGITIS: ICD-10-CM

## 2019-11-21 NOTE — PATIENT INSTRUCTIONS
Your EP Study and Atrial Fibrillation Ablation procedure has been scheduled for 1/29/20 at 1:00 pm, at [facility].    Please report to Admitting Department by 11:00 am, or 2 hours prior to your scheduled procedure. Please bring a list of your current medications and medication bottles, if able, to the hospital on this day. You will be unable to drive after your procedure so please make sure to bring someone with you to your procedure. You will need to have nothing to eat or drink after midnight, the night prior to your procedure. You may have small sips of water, if needed, to take with your medication. You will need labs drawn prior to your procedure. Please go to [facility] to have this done no sooner than 12/29/19 and no later than 1/22/20. You will be scheduled for a Chest CTA to map out your pulmonary veins. Please call central scheduling at [phone] to schedule your test.    You will also need to see Dr. Yg Coello Nurse Practitioner, [facility], in office prior to your procedure. An appointment has been scheduled for 1/3/20 at 10:20 am.    You should NOT stop your medication prior to your scheduled procedure. After your procedure, you will need to follow up with Dr. Dominick Stratton.  Your follow-up appointment has been scheduled for 2/13/20 at 1:00 pm.

## 2019-11-21 NOTE — PROGRESS NOTES
Cardiac Electrophysiology OFFICE Note Subjective:  
  
Alyson Trejo is a 64 y.o. patient who is seen for follow up of atrial fibrillation. Propafenone 150 mg po bid was ordered on 11/19/2019, but she has not yet started it. She had called earlier that day reporting AF since that morning with HR 92 bpm.  It did not resolve with 2 separate doses of Toprol XL 12.5 mg. 
 
States episodes have been occurring mainly with eating, may last for hours thereafter. She's also noted increased epigastric tenderness. For the past 2 days, she has taken a full dose of Toprol XL 25 mg po daily, states palpitations have been a bit better controlled, but still feels weak, dizzy, & has some mild dyspnea with moderate exertion. BP today 100/60, did take Toprol XL this morning. ECG shows NSR with narrow QRS. [id number] 1 (female), no OAC. Previous: 
Holter (03/29/2019): HR  bpm, average 75 bpm.  Sinus dang to sinus tach. Rare PACs, rare PVCs. Echo (02/13/2019): LVEF 61-65%, no RWMA. Trace MR. Mild AR. Mild TR. Stress echo (06/16/2017): Normal. 
 
Also treated for PACs & PVCs previously. Nuclear stress (05/03/2013): LVEF 68%, no ischemia. Significant GERD, known hiatal hernia. Sees Dr. Silvio Hicks (GI). Primary cardiologist is Dr. Arianna Amaro. Patient Active Problem List  
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  
 Defect, retinal H33.309  Non-allergic rhinitis J31.0  Multiple allergies Z88.9  Osteopenia M85.80  
 History of kidney stones Z87.442  Benign neoplasm of iris D31.40  Gastroesophageal reflux disease K21.9 Current Outpatient Medications Medication Sig Dispense Refill  METOPROLOL SUCCINATE PO Take 25 mg by mouth. Metoprolol Succ. ER 25 mg. Patient reports taking PRN.  omeprazole (PRILOSEC OTC) 20 mg tablet Take 20 mg by mouth daily.  loratadine (CLARITIN) 10 mg tablet Take 10 mg by mouth daily as needed.  mometasone (NASONEX) 50 mcg/actuation nasal spray 2 Sprays daily as needed.  propafenone (RYTHMOL) 150 mg tablet Take 1 Tab by mouth two (2) times a day. 60 Tab 1  cholecalciferol (VITAMIN D3) 1,000 unit tablet Take  by mouth daily.  TURMERIC PO Take  by mouth daily. Allergies Allergen Reactions  Morphine Rash Leg rash  Penicillins Hives Past Medical History:  
Diagnosis Date  Advance directive discussed with patient 09/13/2016  
 has info  Endometriosis  GERD (gastroesophageal reflux disease) 07/17/2017 GI eval note Garry Holt post visit to ED for atypical chest pains  Menopause LMP-Dec. 2004  Murmur  Reflux esophagitis 07/27/2017 initial eval note 7/27/17 Khai Awe note and 8/1/17 EGD report  Tick bite 06/19/2017 Removed with no problems.  Tongue lesion 4/27/16  
 area removed per note from [facility]  Vertigo  Vitamin D deficiency  Vocal cord dysfunction   
 due to significant allergies Past Surgical History:  
Procedure Laterality Date  ABDOMEN SURGERY PROC UNLISTED  1989  
 cyst removed with right ovary  HX ABDOMINAL LAPAROSCOPY    
 x 2  
 HX COLONOSCOPY  2007  HX COLONOSCOPY  7/26/16  
 10 yr repeat Nichole Griffin  HX ENDOSCOPY  08/01/2017 Nichole Griffin  path report rec'd  HX HYSTERECTOMY  12/2004  HX OTHER SURGICAL  4/27/16 path pend  
 lesion on left lateral border of tongue  HX OTHER SURGICAL    
 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    
 HX RIGHT SALPINGO-OOPHORECTOMY  1980's  
 secondary to mass History reviewed. No arrhythmia or sudden death in family history. Social History Tobacco Use  Smoking status: Never Smoker  Smokeless tobacco: Never Used Substance Use Topics  Alcohol use: No  
  
 
Review of Systems:  
Constitutional: Negative for fever, chills, weight loss, + malaise/fatigue, + weakness. HEENT: Negative for nosebleeds, vision changes. Respiratory: Negative for cough, hemoptysis Cardiovascular: Negative for chest pain, + palpitations, no orthopnea, claudication, leg swelling, syncope, and PND. + lightheadedness, + occasional GILLIS. Gastrointestinal: Negative for nausea, vomiting, diarrhea, blood in stool and melena. +GERD, epigastric tenderness. Genitourinary: Negative for dysuria, and hematuria. Musculoskeletal: Negative for myalgias, arthralgia. Skin: Negative for rash. Heme: Does not bleed or bruise easily. Neurological: Negative for speech change and focal weakness. Objective:  
 
Visit Vitals /60 (BP 1 Location: Left arm, BP Patient Position: Sitting) Pulse 69 Resp 16 Ht 5' 10\" (1.778 m) Wt 172 lb (78 kg) SpO2 98% BMI 24.68 kg/m² Physical Exam:  
Constitutional: well-developed and well-nourished. No respiratory distress. Head: Normocephalic and atraumatic. Eyes: Pupils are equal, round. ENT: Hearing grossly normal. 
Neck: Supple. No JVD present. Cardiovascular: Normal rate, regular rhythm. Exam reveals no gallop and no friction rub. No murmur heard. Pulmonary/Chest: Effort normal and breath sounds normal. No wheezes. Abdominal: Soft, no tenderness. Musculoskeletal: No edema. Neurological: Alert, oriented. Skin: Skin is warm and dry Psychiatric: Appears mildly anxious, otherwise normal mood and affect. Behavior is normal. Judgment and thought content normal.   
 
ECG: NSR, QRSd 90 ms. Assessment/Plan: ICD-10-CM ICD-9-CM 1. Paroxysmal atrial fibrillation (HCC) I48.0 427.31   
2. Palpitations R00.2 785.1 AMB POC EKG ROUTINE W/ 12 LEADS, INTER & REP 3. Gastroesophageal reflux disease without esophagitis K21.9 530.81   
 
Ms. Pablo Plaza has had recent increase in AF, typically with onset after eating, is symptomatic with palpitations, weakness, fatigue, lightheadedness, & GILLIS. Previously used Toprol XL as pill in the pocket, recently started taking 25 mg po daily. Dosing limited by BP. Discussed rationale for propafenone, discussed what Tikosyn induction would involve. She agrees to trial propafenone 150 mg po bid, will take Toprol XL midday. If medications control arrhythmia well & she tolerates without difficulty, will continue. However, after risks/benefits of ablation were discussed, she would like to tentatively schedule ablation of AF in case meds are ineffective or poorly tolerated. When she comes in for H&P update, will discuss further & she will decide whether to proceed with ablation. States she had labs done earlier this morning, but results not in yet. [id number] 1 (female), no OAC. Future Appointments Date Time Provider Albin Amaral 11/25/2019  9:30 AM Evelin Palomino PA-C [facility] HOWARD Thank you for involving me in this patient's care and please call with further concerns or questions. Melvern Apley, M.D. Electrophysiology/Cardiology [address] [facility] [address], [place] [address] [place] [address] 
[phone]

## 2019-11-22 ENCOUNTER — TELEPHONE (OUTPATIENT)
Dept: CARDIOLOGY CLINIC | Age: 59
End: 2019-11-22

## 2019-11-22 ENCOUNTER — HOSPITAL ENCOUNTER (OUTPATIENT)
Dept: MAMMOGRAPHY | Age: 59
Discharge: HOME | End: 2019-11-22
Attending: FAMILY MEDICINE
Payer: COMMERCIAL

## 2019-11-22 DIAGNOSIS — R92.8 ABNORMAL MAMMOGRAM OF LEFT BREAST: ICD-10-CM

## 2019-11-22 PROCEDURE — 76642 ULTRASOUND BREAST LIMITED: CPT

## 2019-11-22 NOTE — TELEPHONE ENCOUNTER
[id number] 1 (female), no known history of CAD or MI.  ASA isn't necessary, especially if she's having problems with GERD.

## 2019-11-22 NOTE — PROGRESS NOTES
Cardiac Electrophysiology OFFICE Note     Subjective:      Breana Garcia is a 64 y.o. patient who is seen for follow up of atrial fibrillation. Propafenone 150 mg po bid was ordered on 11/19/2019, but she has not yet started it. She had called earlier that day reporting AF since that morning with HR 92 bpm.  It did not resolve with 2 separate doses of Toprol XL 12.5 mg.    States episodes have been occurring mainly with eating, may last for hours thereafter. She's also noted increased epigastric tenderness. She is setting up visit with GI again  For the past 2 days, she has taken a full dose of Toprol XL 25 mg po daily, states palpitations have been a bit better controlled, but still feels weak, dizzy, & has some mild dyspnea with moderate exertion. BP today 100/60, did take Toprol XL this morning. ECG shows NSR with narrow QRS. [id number] 1 (female), no OAC. Previous:  Holter (03/29/2019): HR  bpm, average 75 bpm.  Sinus dang to sinus tach. Rare PACs, rare PVCs. Echo (02/13/2019): LVEF 61-65%, no RWMA. Trace MR. Mild AR. Mild TR. Stress echo (06/16/2017): Normal.    Also treated for PACs & PVCs previously. Nuclear stress (05/03/2013): LVEF 68%, no ischemia. Significant GERD, known hiatal hernia. Sees Dr. Elizabeth Bolaños (GI). Primary cardiologist is Dr. Gateano Rose. 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 Metoprolol Succ. ER 25 mg. Patient reports taking PRN.  omeprazole (PRILOSEC OTC) 20 mg tablet Take 20 mg by mouth daily.       loratadine (CLARITIN) 10 mg tablet Take 10 mg by mouth daily as needed.  mometasone (NASONEX) 50 mcg/actuation nasal spray 2 Sprays daily as needed.  propafenone (RYTHMOL) 150 mg tablet Take 1 Tab by mouth two (2) times a day. 60 Tab 1     Allergies   Allergen Reactions    Morphine Rash     Leg rash    Penicillins Hives     Past Medical History:   Diagnosis Date    Advance directive discussed with patient 09/13/2016    has info    Endometriosis     GERD (gastroesophageal reflux disease) 07/17/2017    GI eval note Herrera Flies post visit to ED for atypical chest pains    Menopause     LMP-Dec. 2004    Murmur     Reflux esophagitis 07/27/2017 initial eval note    7/27/17 [place] note and 8/1/17 EGD report    Tick bite 06/19/2017    Removed with no problems.  Tongue lesion 4/27/16    area removed [facility]    Vertigo     Vitamin D deficiency     Vocal cord dysfunction     due to significant allergies     Past Surgical History:   Procedure Laterality Date    ABDOMEN SURGERY PROC UNLISTED  1989    cyst removed with right ovary    HX ABDOMINAL LAPAROSCOPY      x 2    HX COLONOSCOPY  2007    HX COLONOSCOPY  7/26/16    10 yr repeat [place] [facility]    HX ENDOSCOPY  08/01/2017    [place] [facility]  path report rec'd    HX HYSTERECTOMY  12/2004    HX OTHER SURGICAL  4/27/16 path pend    lesion on left lateral border of tongue    HX OTHER SURGICAL      basal cell face-2005(in [place] [facility])    HX RETINAL DETACHMENT REPAIR      HX RIGHT SALPINGO-OOPHORECTOMY  1980's    secondary to mass     History reviewed. No arrhythmia or sudden death in family history. Social History     Tobacco Use    Smoking status: Never Smoker    Smokeless tobacco: Never Used   Substance Use Topics    Alcohol use: No        Review of Systems:   Constitutional: Negative for fever, chills, weight loss, + malaise/fatigue, + weakness. HEENT: Negative for nosebleeds, vision changes.    Respiratory: Negative for cough, hemoptysis  Cardiovascular: Negative for chest pain, + palpitations, no orthopnea, claudication, leg swelling, syncope, and PND. + lightheadedness, + occasional GILLIS. Gastrointestinal: Negative for nausea, vomiting, diarrhea, blood in stool and melena. +GERD, epigastric tenderness. Genitourinary: Negative for dysuria, and hematuria. Musculoskeletal: Negative for myalgias, arthralgia. Skin: Negative for rash. Heme: Does not bleed or bruise easily. Neurological: Negative for speech change and focal weakness. Objective:     Visit Vitals  /60 (BP 1 Location: Left arm, BP Patient Position: Sitting)   Pulse 69   Resp 16   Ht 5' 10\" (1.778 m)   Wt 172 lb (78 kg)   SpO2 98%   BMI 24.68 kg/m²      Physical Exam:   Constitutional: well-developed and well-nourished. No respiratory distress. Head: Normocephalic and atraumatic. Eyes: Pupils are equal, round. ENT: Hearing grossly normal.  Neck: Supple. No JVD present. Cardiovascular: Normal rate, regular rhythm. Exam reveals no gallop and no friction rub. No murmur heard. Pulmonary/Chest: Effort normal and breath sounds normal. No wheezes. Abdominal: Soft, no tenderness. Musculoskeletal: No edema. Neurological: Alert, oriented. Skin: Skin is warm and dry  Psychiatric: Appears mildly anxious, otherwise normal mood and affect. Behavior is normal. Judgment and thought content normal.      ECG: NSR, QRSd 90 ms. Assessment/Plan:       ICD-10-CM ICD-9-CM    1. Paroxysmal atrial fibrillation (HCC) I48.0 427.31 CBC WITH AUTOMATED DIFF      METABOLIC PANEL, BASIC      CTA CHEST W OR W WO CONT   2. Palpitations R00.2 785.1 AMB POC EKG ROUTINE W/ 12 LEADS, INTER & REP      CBC WITH AUTOMATED DIFF      METABOLIC PANEL, BASIC      CTA CHEST W OR W WO CONT   3. Gastroesophageal reflux disease without esophagitis K21.9 530.81 CBC WITH AUTOMATED DIFF      METABOLIC PANEL, BASIC      CTA CHEST W OR W WO CONT   4.  Pre-procedure lab exam Z01.812 V72.63 CBC WITH AUTOMATED DIFF      METABOLIC PANEL, BASIC      CTA CHEST W OR W WO CONT     Ms. Faiza Dillard has had recent increase in AF, typically with onset after eating, is symptomatic with palpitations, weakness, fatigue, lightheadedness, & GILLIS. Previously used Toprol XL as pill in the pocket, recently started taking 25 mg po daily. Dosing limited by BP. Discussed rationale for propafenone, discussed what Tikosyn admission would involve. She agrees to trial propafenone 150 mg po bid, will take Toprol XL midday. If medications control arrhythmia well & she tolerates without difficulty, will continue. However, after risks/benefits of ablation were discussed, she would like to schedule ablation of AF in case meds are ineffective or poorly tolerated. States she had labs done earlier this morning, but results not in yet. [id number] 1 (female), no OAC. Risks include but are not limited to bleeding in the groin, infection in the groin and/or heart valves, fistula between the groin arteries and veins, valvular damage, diaphragmatic paralysis, aortic dissection, heart attack, stroke, blood clot in the leg, pulmonary embolism, lung collapse (pneumothorax or hemothorax), heart collapse (pericardial tamponade), death. The added risks for left atrial ablation may be atrial-esophageal fistula, pulmonary vein stenoses, kidney failure (from contrast injection), higher risk of bleeding, stroke and heart attack. Elective or emergency surgery may be required to repair some of these complications. Prolonged hospitalization would be required. General anesthesia and transeptal catheterization may be required for the procedure    Future Appointments   Date Time Provider Albin Amaral   11/22/2019 10:00 AM [facility] [address]  [facility]   11/25/2019  9:30 AM Farrah Palomino PA-C [facility] SCHED   1/2/2020 10:20 AM Gracia Ayers  [address]   1/29/2020  1:15 PM [facility] CATH LAB 2 [facility]    2/13/2020  1:00 PM Sheryl Gill MD Berenice Miles     Thank you for involving me in this patient's care and please call with further concerns or questions. Linh Hernandez M.D.   Electrophysiology/Cardiology  [facility]  [facility] [address], [facility] [address] [place] [address]  [phone]

## 2019-11-22 NOTE — TELEPHONE ENCOUNTER
----- Message from Shikha Roblero RN sent at 11/22/2019  9:35 AM EST -----  Regarding: FW: Non-Urgent Medical Question  Contact: [phone]    ----- Message -----  From: Moon Maldonado  Sent: 11/22/2019   9:29 AM EST  To: Dannie Garcia  Subject: Non-Urgent Medical Question                      Hi, one thing I forgot to ask when I came in for my appointment yesterday is whether or not you think I should start a low-dose aspirin regimen. I do have an upset stomach when I eat, attributing that to the GERD issues. Do you have any thoughts on this or should I also get an opinion from my gastroenterologist?  Thanks.

## 2019-11-23 LAB
25(OH)D3+25(OH)D2 SERPL-MCNC: 32.6 NG/ML (ref 30–100)
ALBUMIN SERPL-MCNC: 4.5 G/DL (ref 3.5–5.5)
ALBUMIN/GLOB SERPL: 1.9 {RATIO} (ref 1.2–2.2)
ALP SERPL-CCNC: 87 IU/L (ref 39–117)
ALT SERPL-CCNC: 22 IU/L (ref 0–32)
APPEARANCE UR: CLEAR
AST SERPL-CCNC: 23 IU/L (ref 0–40)
BACTERIA #/AREA URNS HPF: NORMAL /[HPF]
BASOPHILS # BLD AUTO: 0 X10E3/UL (ref 0–0.2)
BASOPHILS NFR BLD AUTO: 1 %
BILIRUB SERPL-MCNC: 0.4 MG/DL (ref 0–1.2)
BILIRUB UR QL STRIP: NEGATIVE
BUN SERPL-MCNC: 15 MG/DL (ref 6–24)
BUN/CREAT SERPL: 16 (ref 9–23)
CALCIUM SERPL-MCNC: 9.9 MG/DL (ref 8.7–10.2)
CASTS URNS QL MICRO: NORMAL /LPF
CCP IGA+IGG SERPL IA-ACNC: 7 UNITS (ref 0–19)
CHLORIDE SERPL-SCNC: 102 MMOL/L (ref 96–106)
CHOLEST SERPL-MCNC: 184 MG/DL (ref 100–199)
CO2 SERPL-SCNC: 24 MMOL/L (ref 20–29)
COLOR UR: YELLOW
CREAT SERPL-MCNC: 0.94 MG/DL (ref 0.57–1)
CRP SERPL-MCNC: <1 MG/L (ref 0–10)
EOSINOPHIL # BLD AUTO: 0.1 X10E3/UL (ref 0–0.4)
EOSINOPHIL NFR BLD AUTO: 1 %
EPI CELLS #/AREA URNS HPF: NORMAL /HPF (ref 0–10)
ERYTHROCYTE [DISTWIDTH] IN BLOOD BY AUTOMATED COUNT: 12.1 % (ref 12.3–15.4)
ERYTHROCYTE [SEDIMENTATION RATE] IN BLOOD BY WESTERGREN METHOD: 6 MM/HR (ref 0–40)
EST. AVERAGE GLUCOSE BLD GHB EST-MCNC: 117 MG/DL
GLOBULIN SER CALC-MCNC: 2.4 G/DL (ref 1.5–4.5)
GLUCOSE SERPL-MCNC: 97 MG/DL (ref 65–99)
GLUCOSE UR QL: NEGATIVE
HBA1C MFR BLD: 5.7 % (ref 4.8–5.6)
HCT VFR BLD AUTO: 40.5 % (ref 34–46.6)
HDLC SERPL-MCNC: 71 MG/DL
HGB BLD-MCNC: 13.5 G/DL (ref 11.1–15.9)
HGB UR QL STRIP: NEGATIVE
IMM GRANULOCYTES # BLD AUTO: 0 X10E3/UL (ref 0–0.1)
IMM GRANULOCYTES NFR BLD AUTO: 0 %
INTERPRETATION, 910389: NORMAL
KETONES UR QL STRIP: ABNORMAL
LDLC SERPL CALC-MCNC: 104 MG/DL (ref 0–99)
LEUKOCYTE ESTERASE UR QL STRIP: NEGATIVE
LYMPHOCYTES # BLD AUTO: 1.4 X10E3/UL (ref 0.7–3.1)
LYMPHOCYTES NFR BLD AUTO: 27 %
MCH RBC QN AUTO: 29.5 PG (ref 26.6–33)
MCHC RBC AUTO-ENTMCNC: 33.3 G/DL (ref 31.5–35.7)
MCV RBC AUTO: 88 FL (ref 79–97)
MICRO URNS: ABNORMAL
MICRO URNS: ABNORMAL
MONOCYTES # BLD AUTO: 0.4 X10E3/UL (ref 0.1–0.9)
MONOCYTES NFR BLD AUTO: 8 %
MUCOUS THREADS URNS QL MICRO: PRESENT
NEUTROPHILS # BLD AUTO: 3.2 X10E3/UL (ref 1.4–7)
NEUTROPHILS NFR BLD AUTO: 63 %
NITRITE UR QL STRIP: NEGATIVE
PH UR STRIP: 5 [PH] (ref 5–7.5)
PLATELET # BLD AUTO: 226 X10E3/UL (ref 150–450)
POTASSIUM SERPL-SCNC: 5 MMOL/L (ref 3.5–5.2)
PROT SERPL-MCNC: 6.9 G/DL (ref 6–8.5)
PROT UR QL STRIP: NEGATIVE
RBC # BLD AUTO: 4.58 X10E6/UL (ref 3.77–5.28)
RBC #/AREA URNS HPF: NORMAL /HPF (ref 0–2)
RHEUMATOID FACT SERPL-ACNC: <10 IU/ML (ref 0–13.9)
SODIUM SERPL-SCNC: 141 MMOL/L (ref 134–144)
SP GR UR: 1.02 (ref 1–1.03)
T4 FREE SERPL-MCNC: 1.34 NG/DL (ref 0.82–1.77)
TRIGL SERPL-MCNC: 45 MG/DL (ref 0–149)
TSH SERPL DL<=0.005 MIU/L-ACNC: 1.56 UIU/ML (ref 0.45–4.5)
URATE SERPL-MCNC: 3.3 MG/DL (ref 2.5–7.1)
URINALYSIS REFLEX, 377202: ABNORMAL
UROBILINOGEN UR STRIP-MCNC: 0.2 MG/DL (ref 0.2–1)
VLDLC SERPL CALC-MCNC: 9 MG/DL (ref 5–40)
WBC # BLD AUTO: 5 X10E3/UL (ref 3.4–10.8)
WBC #/AREA URNS HPF: NORMAL /HPF (ref 0–5)

## 2019-11-26 NOTE — PROGRESS NOTES
A1C 5.7%, prediabetes, diet and lifestyle changes and recheck 1 year, remainder of labs normal, [employer] message sent.

## 2019-12-09 ENCOUNTER — TELEPHONE (OUTPATIENT)
Dept: CARDIOLOGY CLINIC | Age: 59
End: 2019-12-09

## 2019-12-09 NOTE — TELEPHONE ENCOUNTER
----- Message from Shikha Roblero RN sent at 12/9/2019 11:10 AM EST -----  Regarding: FW: Non-Urgent Medical Question  Contact: [phone]    ----- Message -----  From: Trav: 12/9/2019  11:04 AM EST  To: Dannie Wilhelm Pool  Subject: RE: Non-Urgent Medical Question                  I am bummed since it seems that perhaps the beta blocker might be causing a lot of my intestinal distress. Saturday afternoon was the first day I felt like my stomach and bowels were not in constant uproar. Are you SURE I have to take it? If I try to stop just to see if my digestive issues really clear up, and start having problems, how might you recommend I do that? If I start having a-fib issues again, I will start taking it again. Is there another beta blocker I might try that doesn't have \"abdominal discomfort\" listed as a side effect? I just can't see this being my new \"normal\" for the rest of my life.  ----- Message -----  From: Carlito Pedroza RN  Sent: 12/9/2019 11:00 AM EST  To: Moon Maldonado  Subject: RE: Non-Urgent Medical Question  Good Morning Ms. Yamel Cano,     We recommend you continue the beta blocker in addition to the Rythmol to prevent a fib. Narrow QRS just means that there is no conduction delay between the bottom chambers of your heart which is a good thing! Please let me know if you have any other questions. Thank You!      ----- Message -----     From: Moon Maldonado     Sent: 12/8/2019  8:15 AM EST       To: Rosario Soto MD  Subject: RE: Non-Urgent Medical Question    I was traveling on 11/7 and forgot my noon beta blocker. I seemed for that day to get along ok without it. I'm only taking 1/2 tablet anyway. If I wanted to try and stop taking that, 1) would you recommend? 2) do I just stop taking it or do I have to taper off somehow? If so, how? Also, I noticed on my office notes it says I have narrow QRS on the ECQ. What does that mean?

## 2019-12-09 NOTE — TELEPHONE ENCOUNTER
We advise using an AV omar blocker with the propafenone to prevent atrial flutter. If she truly can't tolerate the Toprol XL, she can try switching to diltiazem  mg po daily. She can take this at midday as she's been using the Toprol XL. If she refuses AV omar blocker entirely, she should just know that there's increased risk of atrial flutter breakthrough, but that's her decision to make. She should make sure to monitor BP to make sure she's not hypotensive, as she was borderline in clinic. She's currently scheduled (tentatively) for ablation in 01/2020. We usually continue antiarrhythmics & rate control meds through the 3 month blanking period, but then discontinue after 3 months if it appears that the ablation was successful. Hopefully any medication side effects that she experiences won't have to be her new normal long term, just for a few months.

## 2019-12-10 RX ORDER — DILTIAZEM HYDROCHLORIDE 120 MG/1
120 CAPSULE, COATED, EXTENDED RELEASE ORAL DAILY
Qty: 30 CAP | Refills: 5 | Status: SHIPPED | OUTPATIENT
Start: 2019-12-10 | End: 2020-06-08

## 2019-12-17 ENCOUNTER — OFFICE VISIT (OUTPATIENT)
Dept: FAMILY MEDICINE CLINIC | Age: 59
End: 2019-12-17

## 2019-12-17 VITALS
HEART RATE: 84 BPM | WEIGHT: 168 LBS | SYSTOLIC BLOOD PRESSURE: 121 MMHG | TEMPERATURE: 95.6 F | BODY MASS INDEX: 24.05 KG/M2 | RESPIRATION RATE: 18 BRPM | HEIGHT: 70 IN | DIASTOLIC BLOOD PRESSURE: 73 MMHG | OXYGEN SATURATION: 97 %

## 2019-12-17 DIAGNOSIS — I48.0 PAROXYSMAL ATRIAL FIBRILLATION (HCC): ICD-10-CM

## 2019-12-17 DIAGNOSIS — R10.13 EPIGASTRIC PAIN: ICD-10-CM

## 2019-12-17 DIAGNOSIS — K21.9 GASTROESOPHAGEAL REFLUX DISEASE, ESOPHAGITIS PRESENCE NOT SPECIFIED: Primary | ICD-10-CM

## 2019-12-17 RX ORDER — FAMOTIDINE 20 MG/1
20 TABLET, FILM COATED ORAL
COMMUNITY
End: 2019-12-17 | Stop reason: ALTCHOICE

## 2019-12-17 RX ORDER — SUCRALFATE 1 G/1
TABLET ORAL
COMMUNITY
Start: 2019-12-16 | End: 2020-04-16

## 2019-12-17 NOTE — PROGRESS NOTES
Has HH. Scope 2017. Pbs with SOB last yr relieved with Pepcid. Went to ER Feb '19. Saw GI and switched to Prilosec. Saw card Nov.  Put on Toprol and Rythmol. Got diarrhea. Last week put on Ca channel blocker. Stopped Toprol. Last week had subxiphoid pain. Called GI and put back on Pepcid. To f/u in Jan.  Told to stop the Prilosec. Caused stomach to feel hungry and nausea. Liquid diarrhea. On BRAT. Felt better on lower dose of Pepcid yesterday. Talked to GI nurse yesterday and put on Carafate. Restarted Prilosec this AM.  Taking Pepto, Imodium. Coughing episodes. Using saline spray. Patient denies chest pain, dyspnea, unexpected weight change, unexpected pain, mood or memory changes. Visit Vitals  /73 (BP 1 Location: Left arm, BP Patient Position: Sitting)   Pulse 84   Temp 95.6 °F (35.3 °C)   Resp 18   Ht 5' 10\" (1.778 m)   Wt 168 lb (76.2 kg)   SpO2 97%   BMI 24.11 kg/m²     Patient alert and cooperative. Reviewed above. Assessment:  1. GERD, nausea, dyspepsia, subxiphoid pain. Plan:  1. Agreed with starting Carafate per GI, can continue Prilosec for now, use Imodium for diarrhea. Recommended to get off the Pepto. Can use other anti-nausea med or Tums liquid. 2. Call GI office to try to get in sooner if cancellation. 3. Recheck here otherwise prn. TOTAL FACE TO FACE TIME OF 25 MINUTES SPENT WITH PATIENT. GREATER THAN 50% OF TIME WAS SPENT IN COUNSELING AND/OR COORDINATION OF CARE. SEE ASSESSMENT AND PLAN FOR DETAILS.

## 2019-12-17 NOTE — PROGRESS NOTES
.  Chief Complaint   Patient presents with    Diarrhea    Abdominal Pain     . Visit Vitals  /73 (BP 1 Location: Left arm, BP Patient Position: Sitting)   Pulse 84   Temp 95.6 °F (35.3 °C)   Resp 18   Ht 5' 10\" (1.778 m)   Wt 168 lb (76.2 kg)   SpO2 97%   BMI 24.11 kg/m²     . Michelle Azul

## 2020-01-01 NOTE — PROGRESS NOTES
Cardiac Electrophysiology OFFICE Note     Subjective:      Tarun Harry is a 64 y.o. patient who presents for H&P update prior to upcoming AF ablation (scheduled for 01/29/2020). She was last seen 11/21/2019 by Dr. Lawson Lowry, started propafenone bid at that time with low dose Toprol XL at midday. She noted AF mainly with eating, had frequent weakness & dizziness, moderate GILLIS. She called in early December reporting intestinal distress with beta blocker. She switched to diltiazem  mg po daily. Since doing that & starting carafate, she has had great improvement in GI symptoms. Palpitations have been well controlled. She had an instance where it felt as though they \"might start\", but this was only once & didn't develop into sustained palpitations. BP today 120/80. She tends to trend low. ECG today shows NSR with QRSd 98 ms. [id number] 1 (female), no OAC. Previous:  Holter (03/29/2019): HR  bpm, average 75 bpm.  Sinus dang to sinus tach. Rare PACs, rare PVCs. Echo (02/13/2019): LVEF 61-65%, no RWMA. Trace MR. Mild AR. Mild TR. Stress echo (06/16/2017): Normal.    Also treated for PACs & PVCs previously. Nuclear stress (05/03/2013): LVEF 68%, no ischemia. Significant GERD, known hiatal hernia. Sees Dr. Marilyn Ward (GI). Primary cardiologist is Dr. Haley Gonzáles. 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  30 Cap 5    propafenone (RYTHMOL) 150 mg tablet Take 1 Tab by mouth two (2) times a day. 60 Tab 1    omeprazole (PRILOSEC OTC) 20 mg tablet Take 10 mg by mouth daily.  loratadine (CLARITIN) 10 mg tablet Take 10 mg by mouth daily as needed. Allergies   Allergen Reactions    Morphine Rash     Leg rash    Penicillins Hives     Past Medical History:   Diagnosis Date    Advance directive discussed with patient 09/13/2016    has info    Endometriosis     GERD (gastroesophageal reflux disease) 07/17/2017    GI eval note Masha Cevallos post visit to ED for atypical chest pains    Menopause     LMP-Dec. 2004    Murmur     Reflux esophagitis 07/27/2017 initial eval note    7/27/17 [place] [facility] note and 8/1/17 EGD report    Tick bite 06/19/2017    Removed with no problems.  Tongue lesion 4/27/16    [place] [facility]    Vertigo     Vitamin D deficiency     Vocal cord dysfunction     due to significant allergies     Past Surgical History:   Procedure Laterality Date    ABDOMEN SURGERY PROC UNLISTED  1989    cyst removed with right ovary    HX ABDOMINAL LAPAROSCOPY      x 2    HX COLONOSCOPY  2007    HX COLONOSCOPY  7/26/16    10 yr repeat [facility]    HX ENDOSCOPY  08/01/2017    [facility]  path report rec'd    HX HYSTERECTOMY  12/2004    HX OTHER SURGICAL  4/27/16 path pend    lesion on left lateral border of tongue    HX OTHER SURGICAL      basal cell face-2005(in [facility])    HX RETINAL DETACHMENT REPAIR      HX RIGHT SALPINGO-OOPHORECTOMY  1980's    secondary to mass     History reviewed. No arrhythmia or sudden death in family history. Social History     Tobacco Use    Smoking status: Never Smoker    Smokeless tobacco: Never Used   Substance Use Topics    Alcohol use: No        Review of Systems:   Constitutional: Negative for fever, chills, weight loss, + malaise/fatigue, improved. HEENT: Negative for nosebleeds, vision changes.    Respiratory: Negative for cough, hemoptysis  Cardiovascular: Negative for chest pain, no orthopnea, claudication, leg swelling, syncope, and PND. + occasional GILLIS. Gastrointestinal: Negative for nausea, vomiting, diarrhea, blood in stool and melena. Genitourinary: Negative for dysuria, and hematuria. Musculoskeletal: Negative for myalgias, arthralgia. Skin: Negative for rash. Heme: Does not bleed or bruise easily. Neurological: Negative for speech change and focal weakness. Objective:     Visit Vitals  /80 (BP 1 Location: Left arm, BP Patient Position: Sitting)   Pulse 68   Ht 5' 10\" (1.778 m)   Wt 167 lb 11.2 oz (76.1 kg)   SpO2 97%   BMI 24.06 kg/m²      Physical Exam:   Constitutional: well-developed and well-nourished. No respiratory distress. Head: Normocephalic and atraumatic. Eyes: Pupils are equal, round. ENT: Hearing grossly normal.  Neck: Supple. No JVD present. Cardiovascular: Normal rate, regular rhythm. Exam reveals no gallop and no friction rub. No murmur heard. Pulmonary/Chest: Effort normal and breath sounds normal. No wheezes. Abdominal: Soft, no tenderness. Musculoskeletal: No edema. Neurological: Alert, oriented. Skin: Skin is warm and dry  Psychiatric: Normal mood and affect. Behavior is normal. Judgment and thought content normal.      ECG: NSR, QRSd 98 ms. Assessment/Plan:       ICD-10-CM ICD-9-CM    1. Paroxysmal atrial fibrillation (HCC) I48.0 427.31 AMB POC EKG ROUTINE W/ 12 LEADS, INTER & REP   2. Palpitations R00.2 785.1        Ms. Nguyen Home has history of symptomatic paroxysmal AF. Unable to tolerate daily beta blocker due to GI upset. On propafenone & diltiazem currently; palpitations well controlled. Medication dosing had been limited due to low BP, but BP normal today. NSR today with QRSd 98 ms. OK to continue propafenone. Advised that it's ok to take a midday dose of propafenone prn if she has sustained palpitations.   Otherwise, continue 150 mg po bid with diltiazem  mg po daily.    [id number] 1, no OAC. After discussion of risks & benefits, she states she would like to continue medications for now & postpone ablation. Follow up with Dr. Reena Zhong in 3 months. She will call sooner in the interim for new/worsening issues. Future Appointments   Date Time Provider Albin Amaral   1/29/2020  1:15 PM [facility] CATH LAB 2 [facility]   4/16/2020  8:00 AM Katie Duron  [address]     Thank you for involving me in this patient's care and please call with further concerns or questions.     Nancy Dennison, RAYA-JAMIL LaraRoderick  [facility] [place]  01/02/20

## 2020-01-02 ENCOUNTER — OFFICE VISIT (OUTPATIENT)
Dept: CARDIOLOGY CLINIC | Age: 60
End: 2020-01-02

## 2020-01-02 VITALS
OXYGEN SATURATION: 97 % | WEIGHT: 167.7 LBS | DIASTOLIC BLOOD PRESSURE: 80 MMHG | BODY MASS INDEX: 24.01 KG/M2 | HEART RATE: 68 BPM | HEIGHT: 70 IN | SYSTOLIC BLOOD PRESSURE: 120 MMHG

## 2020-01-02 DIAGNOSIS — R00.2 PALPITATIONS: ICD-10-CM

## 2020-01-02 DIAGNOSIS — I48.0 PAROXYSMAL ATRIAL FIBRILLATION (HCC): Primary | ICD-10-CM

## 2020-01-06 ENCOUNTER — TELEPHONE (OUTPATIENT)
Dept: CARDIOLOGY CLINIC | Age: 60
End: 2020-01-06

## 2020-01-06 NOTE — TELEPHONE ENCOUNTER
We don't typically see a correlation with AF & vitamin D3; however, given that she noted increased AF after starting it, would recommend stopping it to see if that calms things back down. We don't advise CoQ10. In general, we advise against supplements, as they aren't regulated by the FDA.

## 2020-01-06 NOTE — TELEPHONE ENCOUNTER
----- Message from Shaquille Green sent at 1/6/2020  7:42 AM EST -----  Regarding: FW: Non-Urgent Medical Question  Contact: [phone]    ----- Message -----  From: Kenroy Mayers  Sent: 1/5/2020   4:33 PM EST  To: Jim Wilhelm Pool  Subject: Non-Urgent Medical Question                      Note that after my Jan 2 appointment where we decided to cancel my 1/29 ablation, I have had some issues since (of course!). Yesterday in particular I think I was in a-fib for a good bit of the afternoon. It was not \"leap in my chest\" bad, but definitely not beating regularly. I did NOT take an extra propafenone since it finally corrected itself late afternoon. The only thing I've done differently this week is start taking Vitamin D3, 400 iu/day. This morning I did NOT take it and haven't had issues. Could D3 have been interacting with my medication causing these breakthroughs? Should I take it or not? Also, what are your thoughts about whether I should start taking COQ10?

## 2020-03-04 RX ORDER — PROPAFENONE HYDROCHLORIDE 150 MG/1
TABLET, FILM COATED ORAL
Qty: 60 TAB | Refills: 1 | Status: SHIPPED | OUTPATIENT
Start: 2020-03-04 | End: 2020-05-18

## 2020-04-06 ENCOUNTER — TELEPHONE (OUTPATIENT)
Dept: CARDIOLOGY CLINIC | Age: 60
End: 2020-04-06

## 2020-04-06 NOTE — TELEPHONE ENCOUNTER
Left voice message on 4/6/2020 requesting a return call to schedule a VV or Phone Call visit or discuss other options with patient.

## 2020-04-15 NOTE — PROGRESS NOTES
Cardiac Electrophysiology TELEPHONE VIRTUAL VISIT Note     Pursuant to the emergency declaration under the [facility], [facility] waiver authority and the [employer] Act, this Virtual  Visit was conducted, with patient's consent, to reduce the patient's risk of exposure to COVID-19 and provide continuity of care for an established patient. Services were provided through an audio synchronous discussion virtually to substitute for in-person clinic visit. Subjective:      Sailaja Malhotra is a 64 y.o. patient who is addressed via synchronous audio call for follow up of PAF. States she is doing well with combination of low dose diltiazem CD & propafenone bid. She had one breakthrough palpitations, when spreading mulch for 1.5 hrs. 3-4 days after that she had another one    She is off carafate and prilosec. BP tends to trend low. ECG on 01/13/2020 showed NSR with QRSd 98 ms. [id number] 1 (female), no OAC. Previous:  Planned ablation for 01/2020, but canceled. Intestinal distress with beta blocker. Holter (03/29/2019): HR  bpm, average 75 bpm.  Sinus dang to sinus tach. Rare PACs, rare PVCs. Echo (02/13/2019): LVEF 61-65%, no RWMA. Trace MR. Mild AR. Mild TR. Stress echo (06/16/2017): Normal.    Also treated for PACs & PVCs previously. Nuclear stress (05/03/2013): LVEF 68%, no ischemia. Significant GERD, known hiatal hernia. Sees Dr. Ayad Gomez (GI). Primary cardiologist is Dr. Terrence Gallardo.       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 30 Cap 5    omeprazole (PRILOSEC OTC) 20 mg tablet Take 10 mg by mouth daily.  loratadine (CLARITIN) 10 mg tablet Take 10 mg by mouth daily as needed. Allergies   Allergen Reactions    Morphine Rash     Leg rash    Penicillins Hives     Past Medical History:   Diagnosis Date    Advance directive discussed with patient 09/13/2016    has info    Endometriosis     GERD (gastroesophageal reflux disease) 07/17/2017    GI eval note Brennan Schulte post visit to ED for atypical chest pains    Menopause     LMP-Dec. 2004    Murmur     Reflux esophagitis 07/27/2017 initial eval note    7/27/17 Nancy Merck note and 8/1/17 EGD report    Tick bite 06/19/2017    Removed with no problems.  [place]ue lesion 4/27/16    [place] [facility] [place]    Vertigo     Vitamin D deficiency     Vocal cord dysfunction     due to significant allergies     Past Surgical History:   Procedure Laterality Date    ABDOMEN SURGERY PROC UNLISTED  1989    cyst removed with right ovary    HX ABDOMINAL LAPAROSCOPY      x 2    HX COLONOSCOPY  2007    HX COLONOSCOPY  7/26/16    10 yr repeat Jenifer Sutton    HX ENDOSCOPY  08/01/2017    Jenifer Sutton  path report rec'd    HX HYSTERECTOMY  12/2004    HX OTHER SURGICAL  4/27/16 path pend    lesion on left lateral border of tongue    HX OTHER SURGICAL      basal cell face-2005([facility])    HX RETINAL DETACHMENT REPAIR      HX RIGHT SALPINGO-OOPHORECTOMY  1980's    secondary to mass     History reviewed. No arrhythmia or sudden death in family history.   Social History     Tobacco Use    Smoking status: Never Smoker    Smokeless tobacco: Never Used   Substance Use Topics    Alcohol use: No        Review of Systems:   Constitutional: Negative for fever, chills, weight loss, + malaise/fatigue, improved. HEENT: Negative for nosebleeds, vision changes. Respiratory: Negative for cough, hemoptysis  Cardiovascular: Negative for chest pain, no orthopnea, claudication, leg swelling, syncope, and PND. + occasional GILLIS, + rare palpitations. Gastrointestinal: Negative for nausea, vomiting, diarrhea, blood in stool and melena. Genitourinary: Negative for dysuria, and hematuria. Musculoskeletal: Negative for myalgias, arthralgia. Skin: Negative for rash. Heme: Does not bleed or bruise easily. Neurological: Negative for speech change and focal weakness. Objective:     Due to this being a TeleHealth evaluation, many elements of the physical examination are unable to be assessed. Neuro: A&Ox3, speech clear, answering questions appropriately. Assessment/Plan:       ICD-10-CM ICD-9-CM    1. Paroxysmal atrial fibrillation (HCC) I48.0 427.31    2. Palpitations R00.2 785.1    3. Gastroesophageal reflux disease without esophagitis K21.9 530.81      Ms. Gisela Griggs has history of symptomatic paroxysmal AF. Unable to tolerate daily beta blocker due to GI upset. On propafenone & diltiazem currently    Medication dosing had been limited due to low BP, but tolerating current doses today. She wants to stay with this regimen since she is not wanting to do ablation or having recurrent PAF during COVID 19 problem  She is now retired     ECG in 01/2020 showed NSR with QRSd 98 ms. OK to continue propafenone just BID. Advised that it's ok to take a midday dose of propafenone prn if she has sustained palpitations. She did not want to take it as pill in pocket 600 mg option and daily cardizem    [id number] 1, no OAC. Follow up in 6 months with Dr Joseph Casey and see me again as needed when failing medication or want ablation    12 minutes on the phone      We discussed the expected course, resolution and complications of the diagnosis(es) in detail. Medication risks, benefits, costs, interactions, and alternatives were discussed as indicated. I advised her to contact the office if her condition worsens, changes or fails to improve as anticipated. She expressed understanding with the diagnosis(es) and plan. Patient was made aware and verbalized understanding that an appointment will be scheduled for them for a virtual visit and/or office visit within the above time frame. Patient understanding his/her responsibility to call and change time/date if he/she so chooses. Thank you for involving me in this patient's care and please call with further concerns or questions. Mitchell Funes M.D.   Electrophysiology/Cardiology  [facility]  [facility], [facility] [address] [place] [address]  [phone]

## 2020-04-16 ENCOUNTER — TELEPHONE (OUTPATIENT)
Dept: CARDIOLOGY CLINIC | Age: 60
End: 2020-04-16

## 2020-04-16 ENCOUNTER — VIRTUAL VISIT (OUTPATIENT)
Dept: CARDIOLOGY CLINIC | Age: 60
End: 2020-04-16

## 2020-04-16 DIAGNOSIS — I48.0 PAROXYSMAL ATRIAL FIBRILLATION (HCC): Primary | ICD-10-CM

## 2020-04-16 DIAGNOSIS — R00.2 PALPITATIONS: ICD-10-CM

## 2020-04-16 DIAGNOSIS — K21.9 GASTROESOPHAGEAL REFLUX DISEASE WITHOUT ESOPHAGITIS: ICD-10-CM

## 2020-04-16 NOTE — TELEPHONE ENCOUNTER
Patient stated that she is returning Rishi's call regarding an appointment. Please advise.     Phone #: [phone]  Thanks

## 2020-04-16 NOTE — TELEPHONE ENCOUNTER
Called Patient Left a message to return call to office at their earliest convenience. Pt Needs to be scheduled for 6 months with Dr. Zara Steven.

## 2020-04-20 NOTE — TELEPHONE ENCOUNTER
Future Appointments   Date Time Provider Albin Amaral   10/12/2020  9:40 AM Gil Gutiérrez  [address]

## 2020-05-18 RX ORDER — PROPAFENONE HYDROCHLORIDE 150 MG/1
150 TABLET, FILM COATED ORAL 2 TIMES DAILY
Qty: 180 TAB | Refills: 1 | Status: SHIPPED | OUTPATIENT
Start: 2020-05-18 | End: 2020-09-09

## 2020-05-18 NOTE — TELEPHONE ENCOUNTER
Request for Propafenone 150 mg BID. Last office visit 4/16/20, next office visit 10/12/20 with Dr. Faiza Doll. Refills per verbal order from Dr. Harshad Dennison.

## 2020-06-06 ENCOUNTER — APPOINTMENT (OUTPATIENT)
Dept: CT IMAGING | Age: 60
End: 2020-06-06
Attending: EMERGENCY MEDICINE
Payer: COMMERCIAL

## 2020-06-06 ENCOUNTER — TELEPHONE (OUTPATIENT)
Dept: CARDIOLOGY CLINIC | Age: 60
End: 2020-06-06

## 2020-06-06 ENCOUNTER — HOSPITAL ENCOUNTER (EMERGENCY)
Age: 60
Discharge: HOME | End: 2020-06-06
Attending: EMERGENCY MEDICINE | Admitting: EMERGENCY MEDICINE
Payer: COMMERCIAL

## 2020-06-06 ENCOUNTER — APPOINTMENT (OUTPATIENT)
Dept: GENERAL RADIOLOGY | Age: 60
End: 2020-06-06
Attending: EMERGENCY MEDICINE
Payer: COMMERCIAL

## 2020-06-06 VITALS
OXYGEN SATURATION: 98 % | RESPIRATION RATE: 16 BRPM | HEART RATE: 82 BPM | TEMPERATURE: 98.3 F | DIASTOLIC BLOOD PRESSURE: 84 MMHG | SYSTOLIC BLOOD PRESSURE: 130 MMHG

## 2020-06-06 DIAGNOSIS — R53.1 WEAKNESS: Primary | ICD-10-CM

## 2020-06-06 DIAGNOSIS — R06.02 SHORTNESS OF BREATH: ICD-10-CM

## 2020-06-06 DIAGNOSIS — R42 LIGHTHEADED: ICD-10-CM

## 2020-06-06 LAB
ALBUMIN SERPL-MCNC: 3.8 G/DL (ref 3.5–5)
ALBUMIN/GLOB SERPL: 1.1 {RATIO} (ref 1.1–2.2)
ALP SERPL-CCNC: 112 U/L (ref 45–117)
ALT SERPL-CCNC: 44 U/L (ref 12–78)
ANION GAP SERPL CALC-SCNC: 5 MMOL/L (ref 5–15)
AST SERPL-CCNC: 20 U/L (ref 15–37)
BASOPHILS # BLD: 0 K/UL (ref 0–0.1)
BASOPHILS NFR BLD: 1 % (ref 0–1)
BILIRUB SERPL-MCNC: 0.3 MG/DL (ref 0.2–1)
BUN SERPL-MCNC: 10 MG/DL (ref 6–20)
BUN/CREAT SERPL: 12 (ref 12–20)
CALCIUM SERPL-MCNC: 9.2 MG/DL (ref 8.5–10.1)
CHLORIDE SERPL-SCNC: 109 MMOL/L (ref 97–108)
CO2 SERPL-SCNC: 28 MMOL/L (ref 21–32)
CREAT SERPL-MCNC: 0.86 MG/DL (ref 0.55–1.02)
D DIMER PPP FEU-MCNC: 0.22 MG/L FEU (ref 0–0.65)
DIFFERENTIAL METHOD BLD: NORMAL
EOSINOPHIL # BLD: 0 K/UL (ref 0–0.4)
EOSINOPHIL NFR BLD: 1 % (ref 0–7)
ERYTHROCYTE [DISTWIDTH] IN BLOOD BY AUTOMATED COUNT: 12.5 % (ref 11.5–14.5)
GLOBULIN SER CALC-MCNC: 3.4 G/DL (ref 2–4)
GLUCOSE SERPL-MCNC: 126 MG/DL (ref 65–100)
HCT VFR BLD AUTO: 40.7 % (ref 35–47)
HGB BLD-MCNC: 13.1 G/DL (ref 11.5–16)
IMM GRANULOCYTES # BLD AUTO: 0 K/UL (ref 0–0.04)
IMM GRANULOCYTES NFR BLD AUTO: 0 % (ref 0–0.5)
INR PPP: 1 (ref 0.9–1.1)
LYMPHOCYTES # BLD: 1.2 K/UL (ref 0.8–3.5)
LYMPHOCYTES NFR BLD: 21 % (ref 12–49)
MAGNESIUM SERPL-MCNC: 2.1 MG/DL (ref 1.6–2.4)
MCH RBC QN AUTO: 29.8 PG (ref 26–34)
MCHC RBC AUTO-ENTMCNC: 32.2 G/DL (ref 30–36.5)
MCV RBC AUTO: 92.5 FL (ref 80–99)
MONOCYTES # BLD: 0.3 K/UL (ref 0–1)
MONOCYTES NFR BLD: 6 % (ref 5–13)
NEUTS SEG # BLD: 4.2 K/UL (ref 1.8–8)
NEUTS SEG NFR BLD: 71 % (ref 32–75)
NRBC # BLD: 0 K/UL (ref 0–0.01)
NRBC BLD-RTO: 0 PER 100 WBC
PLATELET # BLD AUTO: 215 K/UL (ref 150–400)
PMV BLD AUTO: 9.9 FL (ref 8.9–12.9)
POTASSIUM SERPL-SCNC: 3.7 MMOL/L (ref 3.5–5.1)
PROT SERPL-MCNC: 7.2 G/DL (ref 6.4–8.2)
PROTHROMBIN TIME: 10.1 SEC (ref 9–11.1)
RBC # BLD AUTO: 4.4 M/UL (ref 3.8–5.2)
SODIUM SERPL-SCNC: 142 MMOL/L (ref 136–145)
WBC # BLD AUTO: 5.8 K/UL (ref 3.6–11)

## 2020-06-06 PROCEDURE — 70450 CT HEAD/BRAIN W/O DYE: CPT

## 2020-06-06 PROCEDURE — 85379 FIBRIN DEGRADATION QUANT: CPT

## 2020-06-06 PROCEDURE — 85025 COMPLETE CBC W/AUTO DIFF WBC: CPT

## 2020-06-06 PROCEDURE — 83735 ASSAY OF MAGNESIUM: CPT

## 2020-06-06 PROCEDURE — 85610 PROTHROMBIN TIME: CPT

## 2020-06-06 PROCEDURE — 71046 X-RAY EXAM CHEST 2 VIEWS: CPT

## 2020-06-06 PROCEDURE — 36415 COLL VENOUS BLD VENIPUNCTURE: CPT

## 2020-06-06 PROCEDURE — 99284 EMERGENCY DEPT VISIT MOD MDM: CPT

## 2020-06-06 PROCEDURE — 93005 ELECTROCARDIOGRAM TRACING: CPT

## 2020-06-06 PROCEDURE — 80053 COMPREHEN METABOLIC PANEL: CPT

## 2020-06-06 NOTE — DISCHARGE INSTRUCTIONS
Patient Education        Dizziness: Care Instructions  Your Care Instructions  Dizziness is the feeling of unsteadiness or fuzziness in your head. It is different than having vertigo, which is a feeling that the room is spinning or that you are moving or falling. It is also different from lightheadedness, which is the feeling that you are about to faint. It can be hard to know what causes dizziness. Some people feel dizzy when they have migraine headaches. Sometimes bouts of flu can make you feel dizzy. Some medical conditions, such as heart problems or high blood pressure, can make you feel dizzy. Many medicines can cause dizziness, including medicines for high blood pressure, pain, or anxiety. If a medicine causes your symptoms, your doctor may recommend that you stop or change the medicine. If it is a problem with your heart, you may need medicine to help your heart work better. If there is no clear reason for your symptoms, your doctor may suggest watching and waiting for a while to see if the dizziness goes away on its own. Follow-up care is a key part of your treatment and safety. Be sure to make and go to all appointments, and call your doctor if you are having problems. It's also a good idea to know your test results and keep a list of the medicines you take. How can you care for yourself at home? · If your doctor recommends or prescribes medicine, take it exactly as directed. Call your doctor if you think you are having a problem with your medicine. · Do not drive while you feel dizzy. · Try to prevent falls. Steps you can take include:  ? Using nonskid mats, adding grab bars near the tub, and using night-lights. ? Clearing your home so that walkways are free of anything you might trip on.  ? Letting family and friends know that you have been feeling dizzy. This will help them know how to help you. When should you call for help? [id number] anytime you think you may need emergency care.  For example, call if:  · You passed out (lost consciousness). · You have dizziness along with symptoms of a heart attack. These may include:  ? Chest pain or pressure, or a strange feeling in the chest.  ? Sweating. ? Shortness of breath. ? Nausea or vomiting. ? Pain, pressure, or a strange feeling in the back, neck, jaw, or upper belly or in one or both shoulders or arms. ? Lightheadedness or sudden weakness. ? A fast or irregular heartbeat. · You have symptoms of a stroke. These may include:  ? Sudden numbness, tingling, weakness, or loss of movement in your face, arm, or leg, especially on only one side of your body. ? Sudden vision changes. ? Sudden trouble speaking. ? Sudden confusion or trouble understanding simple statements. ? Sudden problems with walking or balance. ? A sudden, severe headache that is different from past headaches. Call your doctor now or seek immediate medical care if:  · You feel dizzy and have a fever, headache, or ringing in your ears. · You have new or increased nausea and vomiting. · Your dizziness does not go away or comes back. Watch closely for changes in your health, and be sure to contact your doctor if:  · You do not get better as expected. Where can you learn more? Go to http://lenny-gage.info/  Enter Q823 in the search box to learn more about \"Dizziness: Care Instructions. \"  Current as of: June 26, 2019               Content Version: 12.5  © 3857-5277 [employer]. Care instructions adapted under license by [employer] (which disclaims liability or warranty for this information). If you have questions about a medical condition or this instruction, always ask your healthcare professional. [place] [address] any warranty or liability for your use of this information.          Patient Education        Lightheadedness or Faintness: Care Instructions  Your Care Instructions  Lightheadedness is a feeling that you are about to faint or \"pass out. \" You do not feel as if you or your surroundings are moving. It is different from vertigo, which is the feeling that you or things around you are spinning or tilting. Lightheadedness usually goes away or gets better when you lie down. If lightheadedness gets worse, it can lead to a fainting spell. It is common to feel lightheaded from time to time. Lightheadedness usually is not caused by a serious problem. It often is caused by a short-lasting drop in blood pressure and blood flow to your head that occurs when you get up too quickly from a seated or lying position. Follow-up care is a key part of your treatment and safety. Be sure to make and go to all appointments, and call your doctor if you are having problems. It's also a good idea to know your test results and keep a list of the medicines you take. How can you care for yourself at home? · Lie down for 1 or 2 minutes when you feel lightheaded. After lying down, sit up slowly and remain sitting for 1 to 2 minutes before slowly standing up. · Avoid movements, positions, or activities that have made you lightheaded in the past.  · Get plenty of rest, especially if you have a cold or flu, which can cause lightheadedness. · Make sure you drink plenty of fluids, especially if you have a fever or have been sweating. · Do not drive or put yourself and others in danger while you feel lightheaded. When should you call for help? [id number] anytime you think you may need emergency care. For example, call if:  · You have symptoms of a stroke. These may include:  ? Sudden numbness, tingling, weakness, or loss of movement in your face, arm, or leg, especially on only one side of your body. ? Sudden vision changes. ? Sudden trouble speaking. ? Sudden confusion or trouble understanding simple statements. ? Sudden problems with walking or balance. ? A sudden, severe headache that is different from past headaches.   · You have symptoms of a heart attack. These may include:  ? Chest pain or pressure, or a strange feeling in the chest.  ? Sweating. ? Shortness of breath. ? Nausea or vomiting. ? Pain, pressure, or a strange feeling in the back, neck, jaw, or upper belly or in one or both shoulders or arms. ? Lightheadedness or sudden weakness. ? A fast or irregular heartbeat. After you call 911, the  may tell you to chew 1 adult-strength or 2 to 4 low-dose aspirin. Wait for an ambulance. Do not try to drive yourself. Watch closely for changes in your health, and be sure to contact your doctor if:  · Your lightheadedness gets worse or does not get better with home care. Where can you learn more? Go to http://lenny-gage.info/  Enter [id number] in the search box to learn more about \"Lightheadedness or Faintness: Care Instructions. \"  Current as of: June 26, 2019               Content Version: 12.5  © 7029-4062 [employer]. Care instructions adapted under license by [employer] (which disclaims liability or warranty for this information). If you have questions about a medical condition or this instruction, always ask your healthcare professional. [place] [address] any warranty or liability for your use of this information. Patient Education        Shortness of Breath: Care Instructions  Your Care Instructions  Shortness of breath has many causes. Sometimes conditions such as anxiety can lead to shortness of breath. Some people get mild shortness of breath when they exercise. Trouble breathing also can be a symptom of a serious problem, such as asthma, lung disease, emphysema, heart problems, and pneumonia. If your shortness of breath continues, you may need tests and treatment. Watch for any changes in your breathing and other symptoms. Follow-up care is a key part of your treatment and safety.  Be sure to make and go to all appointments, and call your doctor if you are having problems. It's also a good idea to know your test results and keep a list of the medicines you take. How can you care for yourself at home? · Do not smoke or allow others to smoke around you. If you need help quitting, talk to your doctor about stop-smoking programs and medicines. These can increase your chances of quitting for good. · Get plenty of rest and sleep. · Take your medicines exactly as prescribed. Call your doctor if you think you are having a problem with your medicine. · Find healthy ways to deal with stress. ? Exercise daily. ? Get plenty of sleep. ? Eat regularly and well. When should you call for help? [id number] anytime you think you may need emergency care. For example, call if:  · You have severe shortness of breath. · You have symptoms of a heart attack. These may include:  ? Chest pain or pressure, or a strange feeling in the chest.  ? Sweating. ? Shortness of breath. ? Nausea or vomiting. ? Pain, pressure, or a strange feeling in the back, neck, jaw, or upper belly or in one or both shoulders or arms. ? Lightheadedness or sudden weakness. ? A fast or irregular heartbeat. After you call 911, the  may tell you to chew 1 adult-strength or 2 to 4 low-dose aspirin. Wait for an ambulance. Do not try to drive yourself. Call your doctor now or seek immediate medical care if:  · Your shortness of breath gets worse or you start to wheeze. Wheezing is a high-pitched sound when you breathe. · You wake up at night out of breath or have to prop your head up on several pillows to breathe. · You are short of breath after only light activity or while at rest.  Watch closely for changes in your health, and be sure to contact your doctor if:  · You do not get better over the next 1 to 2 days. Where can you learn more? Go to http://lenny-gage.info/  Enter S780 in the search box to learn more about \"Shortness of Breath: Care Instructions. \"  Current as of: February 24, 2020               Content Version: 12.5  © 2006-2020 [employer]. Care instructions adapted under license by [employer] (which disclaims liability or warranty for this information). If you have questions about a medical condition or this instruction, always ask your healthcare professional. [address] any warranty or liability for your use of this information. Patient Education        Weakness: Care Instructions  Your Care Instructions     Weakness is a lack of physical or muscle strength. You may feel that you need to make extra effort to move your arms, legs, or other muscles. Generalized weakness means that you feel weak in most areas of your body. Another type of weakness may affect just one muscle or group of muscles. You may feel weak and tired after you have done too much activity, such as taking an extra-long hike. This is not a serious problem. It often goes away on its own. Feeling weak can also be caused by medical conditions like thyroid problems, depression, or a virus. Sometimes the cause can be serious. Your doctor may want to do more tests to try to find the cause of the weakness. The doctor has checked you carefully, but problems can develop later. If you notice any problems or new symptoms, get medical treatment right away. Follow-up care is a key part of your treatment and safety. Be sure to make and go to all appointments, and call your doctor if you are having problems. It's also a good idea to know your test results and keep a list of the medicines you take. How can you care for yourself at home? · Rest when you feel tired. · Be safe with medicines. If your doctor prescribed medicine, take it exactly as prescribed. Call your doctor if you think you are having a problem with your medicine. You will get more details on the specific medicines your doctor prescribes. · Do not skip meals.  Eating a balanced diet may increase your energy level. · Get some physical activity every day, but do not get too tired. When should you call for help? Call your doctor now or seek immediate medical care if:  · You have new or worse weakness. · You are dizzy or lightheaded, or you feel like you may faint. Watch closely for changes in your health, and be sure to contact your doctor if:  · You do not get better as expected. Where can you learn more? Go to http://lenny-gage.info/  Enter V492 in the search box to learn more about \"Weakness: Care Instructions. \"  Current as of: June 26, 2019               Content Version: 12.5  © 7743-9844 [employer]. Care instructions adapted under license by [employer] (which disclaims liability or warranty for this information). If you have questions about a medical condition or this instruction, always ask your healthcare professional. [address] any warranty or liability for your use of this information.

## 2020-06-06 NOTE — ED PROVIDER NOTES
HPI     Pt is a 64 y.o. F with PMH of afib and vertigo here with c/o lightheadedness at 12:30 AM.  She also felt her vision was restricted at that time. She says sx lasted about 2 hours. She then felt weak short of breath and generally weak. She then had 5 BMs. No abdominal pain or N/V. She took two 81 mg ASA PTA. She says it feels like when she has had afib previously but was also concern for stroke and just wanted to be checked. No other complaints at this time. Dr. Zeenat George and Dr. Mcnair cardiology. Past Medical History:   Diagnosis Date    Advance directive discussed with patient 09/13/2016    has info    Endometriosis     GERD (gastroesophageal reflux disease) 07/17/2017    GI eval note Kendal Garcia post visit to ED for atypical chest pains    Menopause     LMP-Dec. 2004    Murmur     Reflux esophagitis 07/27/2017 initial eval note    7/27/17 Ochoa Precious note and 8/1/17 EGD report    Tick bite 06/19/2017    Removed with no problems.  Tongue lesion 4/27/16    area removed per note from [facility]    Vertigo     Vitamin D deficiency     Vocal cord dysfunction     due to significant allergies       Past Surgical History:   Procedure Laterality Date    ABDOMEN SURGERY PROC UNLISTED  1989    cyst removed with right ovary    HX ABDOMINAL LAPAROSCOPY      x 2    HX COLONOSCOPY  2007    HX COLONOSCOPY  7/26/16    10 yr repeat Sofia Abts    HX ENDOSCOPY  08/01/2017    Sofia Abts  path report rec'd    HX HYSTERECTOMY  12/2004    HX OTHER SURGICAL  4/27/16 path pend    lesion on left lateral border of tongue    HX OTHER SURGICAL      basal cell face-2005(in [place])    HX RETINAL DETACHMENT REPAIR      HX RIGHT SALPINGO-OOPHORECTOMY  1980's    secondary to mass         History reviewed. No pertinent family history.     Social History     Socioeconomic History    Marital status: SINGLE     Spouse name: Not on file    Number of children: Not on file    Years of education: Not on file    Highest education level: Not on file   Occupational History    Not on file   Social Needs    Financial resource strain: Not on file    Food insecurity     Worry: Not on file     Inability: Not on file    Transportation needs     Medical: Not on file     Non-medical: Not on file   Tobacco Use    Smoking status: Never Smoker    Smokeless tobacco: Never Used   Substance and Sexual Activity    Alcohol use: No    Drug use: No    Sexual activity: Not on file   Lifestyle    Physical activity     Days per week: Not on file     Minutes per session: Not on file    Stress: Not on file   Relationships    Social connections     Talks on phone: Not on file     Gets together: Not on file     Attends Worship service: Not on file     Active member of club or organization: Not on file     Attends meetings of clubs or organizations: Not on file     Relationship status: Not on file    Intimate partner violence     Fear of current or ex partner: Not on file     Emotionally abused: Not on file     Physically abused: Not on file     Forced sexual activity: Not on file   Other Topics Concern    Not on file   Social History Narrative    Not on file         ALLERGIES: Morphine and Penicillins    Review of Systems   Constitutional: Negative for chills, diaphoresis and fever. HENT: Negative for congestion and trouble swallowing. Eyes: Negative for photophobia and visual disturbance. Respiratory: Positive for cough and shortness of breath. Negative for chest tightness. Cardiovascular: Negative for chest pain, palpitations and leg swelling. Gastrointestinal: Positive for diarrhea. Negative for abdominal pain, nausea and vomiting. Genitourinary: Negative for difficulty urinating, dysuria, flank pain and frequency. Musculoskeletal: Negative for back pain and myalgias. Skin: Negative for rash and wound. Neurological: Positive for dizziness, weakness and light-headedness. Negative for headaches.    Hematological: Negative for adenopathy. Does not bruise/bleed easily. Psychiatric/Behavioral: Negative for agitation and confusion. All other systems reviewed and are negative. Vitals:    06/06/20 0750   BP: 130/84   Pulse: 82   Resp: 16   Temp: 98.3 °F (36.8 °C)   SpO2: 98%            Physical Exam  Vitals signs and nursing note reviewed. Constitutional:       General: She is not in acute distress. Appearance: She is well-developed. She is not diaphoretic. HENT:      Head: Normocephalic. Eyes:      Conjunctiva/sclera: Conjunctivae normal.      Pupils: Pupils are equal, round, and reactive to light. Neck:      Musculoskeletal: Normal range of motion and neck supple. Vascular: No JVD. Cardiovascular:      Rate and Rhythm: Normal rate and regular rhythm. Heart sounds: Normal heart sounds. Pulmonary:      Effort: Pulmonary effort is normal.      Breath sounds: Normal breath sounds. Abdominal:      General: Bowel sounds are normal. There is no distension. Palpations: Abdomen is soft. Tenderness: There is no abdominal tenderness. Musculoskeletal: Normal range of motion. General: No tenderness or deformity. Lymphadenopathy:      Cervical: No cervical adenopathy. Skin:     General: Skin is warm and dry. Capillary Refill: Capillary refill takes less than 2 seconds. Findings: No erythema or rash. Neurological:      Mental Status: She is alert and oriented to person, place, and time. Cranial Nerves: No cranial nerve deficit. Sensory: No sensory deficit. MDM       Procedures    ED EKG interpretation:  Rhythm: normal sinus rhythm; and regular . Rate (approx.): 72; Axis: normal; P wave: normal; QRS interval: normal ; ST/T wave: normal;EKG documented by Caity Rodriguez MD, as interpreted by Carlos Girard MD, ED MD.    9:37 AM  Patient's results have been reviewed with them.   Patient and/or family have verbally conveyed their understanding and agreement of the patient's signs, symptoms, diagnosis, treatment and prognosis and additionally agree to follow up as recommended or return to the Emergency Room should their condition change prior to follow-up. Discharge instructions have also been provided to the patient with some educational information regarding their diagnosis as well a list of reasons why they would want to return to the ER prior to their follow-up appointment should their condition change.     Bridget Jackson MD

## 2020-06-06 NOTE — TELEPHONE ENCOUNTER
Called on call  Patient followed by Dr. Shelby Guevara with a history of PAF on low-dose Toprol. She called reporting that she is had several days of GI increased bowel movements and felt washed out and some perhaps tachycardia. She went to the emergency department yesterday because of this the night before     apparently her work-up there for shortness of breath weakness and lightheadedness was unremarkable by labs EKG other testing. We discussed that since she has been home she still feels a sense that when she wakes up from a nap she \"gasp for air\". Her O2 sats were 98% in the ER. She has no blood pressure cuff for O2 monitor at home right now but can get one . She had feels no chest pain and has had no edema. I recommended to her that she obtain a pulse ox if she can from a friend but overall that her work-up has been good for any acute problems in the ER and probably be best for her to see Dr. Shelby Guevara later in the week and get a loop monitor to see if these are episodes of A. fib again. I will defer follow-up to Dr. Shelby Guevara and his team.  This note was created using voice recognition software. Despite editing, there may be syntax errors.

## 2020-06-06 NOTE — ED NOTES
Informed patient that all things appear normal and MD will be in to talk to her shortly but she is free to get dressed

## 2020-06-06 NOTE — ED TRIAGE NOTES
Rosalinda presents from home with complaints of waking up in the middle of the night (1230) and feeling dizzy. Patient reports \"just feeling odd\". Patient took 2 baby aspirin and then had 4 bowel movements. Patient reports she fell back asleep, but then woke up a few times \"feeling like I wasn't getting enough oxygen\". Patient reports \"I don't feel right\".  Patient report she feels better now

## 2020-06-07 LAB
ATRIAL RATE: 72 BPM
CALCULATED P AXIS, ECG09: 89 DEGREES
CALCULATED R AXIS, ECG10: 87 DEGREES
CALCULATED T AXIS, ECG11: 66 DEGREES
DIAGNOSIS, 93000: NORMAL
P-R INTERVAL, ECG05: 140 MS
Q-T INTERVAL, ECG07: 388 MS
QRS DURATION, ECG06: 94 MS
QTC CALCULATION (BEZET), ECG08: 424 MS
VENTRICULAR RATE, ECG03: 72 BPM

## 2020-06-08 RX ORDER — DILTIAZEM HYDROCHLORIDE 120 MG/1
CAPSULE, COATED, EXTENDED RELEASE ORAL
Qty: 30 CAP | Refills: 2 | Status: SHIPPED | OUTPATIENT
Start: 2020-06-08 | End: 2020-08-25

## 2020-06-08 NOTE — TELEPHONE ENCOUNTER
Dr. Zara Ojeda I could schedule her to come in to see you Wednesday or Thursday. We could maybe schedule her to get a holter on Thursday if she comes in to see you that day, an event monitor can be mailed, or do you want to see her first and go from there?

## 2020-06-08 NOTE — TELEPHONE ENCOUNTER
Dr. Shelby Guevara advised scheduling office visit only for now. Called patient. Verified patient's identity with two identifiers. Scheduled appointment for this Thursday. Patient verbalized understanding and denied further questions or concerns.        Future Appointments   Date Time Provider Albin Amaral   6/11/2020  1:40 PM Willi Langford  [address]   10/12/2020  9:40 AM Willi Langford  [address]

## 2020-06-11 ENCOUNTER — OFFICE VISIT (OUTPATIENT)
Dept: CARDIOLOGY CLINIC | Age: 60
End: 2020-06-11

## 2020-06-11 VITALS
SYSTOLIC BLOOD PRESSURE: 110 MMHG | HEART RATE: 74 BPM | WEIGHT: 165 LBS | DIASTOLIC BLOOD PRESSURE: 70 MMHG | OXYGEN SATURATION: 98 % | RESPIRATION RATE: 18 BRPM | BODY MASS INDEX: 23.68 KG/M2

## 2020-06-11 DIAGNOSIS — R00.2 PALPITATIONS: Primary | ICD-10-CM

## 2020-06-11 DIAGNOSIS — I48.0 PAROXYSMAL ATRIAL FIBRILLATION (HCC): ICD-10-CM

## 2020-06-11 NOTE — PROGRESS NOTES
HISTORY OF PRESENT ILLNESS  Breana Plunkett is a 64 y.o. female. She has a history of one documented episode of paroxysmal atrial fibrillation occurring several years ago. She has had palpitations on occasion since then has been to the emergency room without documentation of recurrence of the arrhythmia. She was started on propafenone by Dr. Mariza Ramos and this seemed to help her symptoms. However for the past several weeks she has been having more episodes of possible atrial fibrillation with associated symptoms such as's bowel movements. It has not been documented despite trips to the emergency. She does admit to being anxious but it is unclear if she is anxious about her heart or whether the anxiety is precipitating the sensation of palpitations. She did not feel well on a beta-blocker but is doing well on diltiazem. 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 180 Tab 1    loratadine (CLARITIN) 10 mg tablet Take 10 mg by mouth daily as needed.        Past Medical History:   Diagnosis Date    Advance directive discussed with patient 09/13/2016    has info    Endometriosis     GERD (gastroesophageal reflux disease) 07/17/2017    GI eval note Adam Molina post visit to ED for atypical chest pains    Menopause     LMP-Dec. 2004    Murmur     Reflux esophagitis 07/27/2017 initial eval note    7/27/17 Ashley Bernardo note and 8/1/17 EGD report    Tick bite 06/19/2017    Removed with no problems.  Tongue lesion 4/27/16    area removed [facility] note from [facility]    Vertigo     Vitamin D deficiency     Vocal cord dysfunction     due to significant allergies     Past Surgical History:   Procedure Laterality Date    ABDOMEN SURGERY PROC UNLISTED  1989    cyst removed with right ovary    HX ABDOMINAL LAPAROSCOPY      x 2    HX COLONOSCOPY  2007    HX COLONOSCOPY  7/26/16    10 yr repeat Corinne Raja    HX ENDOSCOPY  08/01/2017    Corinne Raja  path report rec'd    HX HYSTERECTOMY  12/2004    HX OTHER SURGICAL  4/27/16 path pend    lesion on left lateral border of tongue    HX OTHER SURGICAL      [facility] face-2005[facility]    HX RETINAL DETACHMENT REPAIR      HX RIGHT SALPINGO-OOPHORECTOMY  1980's    secondary to mass       Review of Systems   Cardiovascular: Positive for palpitations. All other systems reviewed and are negative. Visit Vitals  /70   Pulse 74   Resp 18   Wt 165 lb (74.8 kg)   LMP  (LMP Unknown)   SpO2 98%   BMI 23.68 kg/m²       Physical Exam   Constitutional: She is oriented to person, place, and time. She appears well-nourished. Eyes: Conjunctivae are normal.   Neck: Neck supple. Cardiovascular: Normal rate, regular rhythm and normal heart sounds. Exam reveals no gallop and no friction rub. No murmur heard. Pulmonary/Chest: Breath sounds normal. She has no wheezes. She has no rales. Abdominal: Bowel sounds are normal.   Musculoskeletal:         General: No edema. Neurological: She is oriented to person, place, and time. Skin: Skin is dry. Psychiatric: Her behavior is normal.   Nursing note and vitals reviewed. ASSESSMENT and PLAN  It is unclear if she is having recurrence of the atrial fibrillation despite propafenone. I will give her 30-day monitor in this regard and we will call her as results become available. Results will be forwarded to Dr. Arianne Evans.   She wonders about taking a higher dose in the afternoon.   She also was being considered in the past for atrial fibrillation ablation

## 2020-06-11 NOTE — PROGRESS NOTES
1. Have you been to the ER, urgent care clinic since your last visit? Hospitalized since your last visit? Yes, patient went to [facility] on 6/6/20 for shortness of breath and weakness. 2. Have you seen or consulted any other health care providers outside of the [address] [place] [address] since your last visit? Include any pap smears or colon screening.  No

## 2020-06-12 ENCOUNTER — CLINICAL SUPPORT (OUTPATIENT)
Dept: CARDIOLOGY CLINIC | Age: 60
End: 2020-06-12

## 2020-06-12 DIAGNOSIS — I48.0 PAROXYSMAL ATRIAL FIBRILLATION (HCC): ICD-10-CM

## 2020-06-12 DIAGNOSIS — R00.2 PALPITATIONS: ICD-10-CM

## 2020-06-22 ENCOUNTER — TELEPHONE (OUTPATIENT)
Dept: CARDIOLOGY CLINIC | Age: 60
End: 2020-06-22

## 2020-06-22 NOTE — TELEPHONE ENCOUNTER
Patient is having trouble with the [employer] monitor (see mychart message below). Could we try 48 hr holter? She is worried about blockages also. Should I suggest calcium score?

## 2020-06-22 NOTE — TELEPHONE ENCOUNTER
----- Message from Gonzalez Henderson sent at 6/22/2020  9:13 AM EDT -----  Regarding: FW: Non-Urgent Medical Question  Contact: [phone]    ----- Message -----  From: Mayo Lang  Sent: 6/22/2020   8:52 AM EDT  To: Luna Garcia  Subject: RE: Non-Urgent Medical Question                  I tried last Friday and this morning (Mon 6/22) the number below and it is not available in my area. I have had some mild episodes since I've been to see Dr Tonja Edwards. Would a 24 or 48 hr monitor be another option to try? Also, what is Dr Tonja Edwards doing to rule out a developing blockage? Should I get a full-body scan? Note I have also called with these questions / issues this morning but just wanted to make sure you get the message. Thanks in advance.

## 2020-06-22 NOTE — TELEPHONE ENCOUNTER
According to [employer]  showing active waiting for baseline. No longer showing pending which means it has been mailed out. Pt should receive today or tomorrow,. Left vm letting pt know.

## 2020-06-22 NOTE — TELEPHONE ENCOUNTER
Patient stated that she has not yet received her monitor and when she call [employer], it said that it was not available in her area. Please advise.     Phone #: [phone]  Thanks

## 2020-06-22 NOTE — TELEPHONE ENCOUNTER
Patient sent us a few [employer] messages. She received [employer] heart monitor today, but would also like to know if there is a test she should have to rule out blockages. Do you think recommending calcium score would be a good idea to start with?

## 2020-06-22 NOTE — TELEPHONE ENCOUNTER
Patient is returning [employer]. Call. Patient has a couple of questions to ask.  Please advise      [phone]

## 2020-07-06 ENCOUNTER — HOSPITAL ENCOUNTER (OUTPATIENT)
Dept: CT IMAGING | Age: 60
Discharge: HOME | End: 2020-07-06
Attending: SPECIALIST
Payer: SELF-PAY

## 2020-07-06 DIAGNOSIS — R06.02 SHORTNESS OF BREATH: ICD-10-CM

## 2020-07-06 PROCEDURE — 75571 CT HRT W/O DYE W/CA TEST: CPT

## 2020-07-10 ENCOUNTER — TELEPHONE (OUTPATIENT)
Dept: CARDIOLOGY CLINIC | Age: 60
End: 2020-07-10

## 2020-07-10 NOTE — TELEPHONE ENCOUNTER
Dr. Kailash Vasquez-  Patient's end of service event monitor report is on your desk for review. Her f/u is not until October. We told her we would call with results.      Future Appointments   Date Time Provider Albin Cristin   10/12/2020  9:40 AM Mihaela Chakraborty  [address]

## 2020-07-20 ENCOUNTER — DOCUMENTATION ONLY (OUTPATIENT)
Dept: CARDIOLOGY CLINIC | Age: 60
End: 2020-07-20

## 2020-07-20 NOTE — PROGRESS NOTES
Dr Maya Sy put her on 30 day external loop monitor   PACs PVCs noted  She is on cardizem and propafenone  She can go up to 225 mg propafenone three times a day if she is still not having enough control of her palpitations then consider ablation if not able to control by medications  Future Appointments   Date Time Provider Albin Amaral   10/12/2020  9:40 AM Liam Zabala  [address]

## 2020-07-20 NOTE — TELEPHONE ENCOUNTER
Called patient. Verified patient's identity with two identifiers. Confirmed she knew no afib on monitor. Patient mentioned [employer] messages back and forth last week and speaking with the doctors. She had requested copy of results. I told her I would try to find out if they were already mailed to her and if not, would track them down and mail copy to her. Patient verbalized understanding and denied further questions or concerns.

## 2020-08-25 RX ORDER — DILTIAZEM HYDROCHLORIDE 120 MG/1
CAPSULE, COATED, EXTENDED RELEASE ORAL
Qty: 30 CAP | Refills: 2 | Status: SHIPPED | OUTPATIENT
Start: 2020-08-25 | End: 2020-11-18 | Stop reason: SDUPTHER

## 2020-09-09 RX ORDER — PROPAFENONE HYDROCHLORIDE 150 MG/1
TABLET, FILM COATED ORAL
Qty: 180 TAB | Refills: 1 | Status: SHIPPED | OUTPATIENT
Start: 2020-09-09 | End: 2020-10-21 | Stop reason: SDUPTHER

## 2020-09-09 NOTE — TELEPHONE ENCOUNTER
Request received for propafenone 150 mg tabs. Refill authorized.     Future Appointments   Date Time Provider Albin Cristin   10/12/2020  9:40 AM MD HARSH Overton AMB

## 2020-10-21 ENCOUNTER — OFFICE VISIT (OUTPATIENT)
Dept: CARDIOLOGY CLINIC | Age: 60
End: 2020-10-21
Payer: COMMERCIAL

## 2020-10-21 VITALS
OXYGEN SATURATION: 98 % | WEIGHT: 169 LBS | HEART RATE: 78 BPM | DIASTOLIC BLOOD PRESSURE: 72 MMHG | HEIGHT: 70 IN | BODY MASS INDEX: 24.2 KG/M2 | RESPIRATION RATE: 18 BRPM | SYSTOLIC BLOOD PRESSURE: 108 MMHG

## 2020-10-21 DIAGNOSIS — I49.9 IRREGULAR HEART BEATS: ICD-10-CM

## 2020-10-21 DIAGNOSIS — T50.905D DRUG SIDE EFFECTS, SUBSEQUENT ENCOUNTER: ICD-10-CM

## 2020-10-21 DIAGNOSIS — I48.0 PAROXYSMAL ATRIAL FIBRILLATION (HCC): Primary | ICD-10-CM

## 2020-10-21 DIAGNOSIS — R00.2 PALPITATIONS: ICD-10-CM

## 2020-10-21 DIAGNOSIS — Z88.9 MULTIPLE ALLERGIES: ICD-10-CM

## 2020-10-21 DIAGNOSIS — K21.9 GASTROESOPHAGEAL REFLUX DISEASE WITHOUT ESOPHAGITIS: ICD-10-CM

## 2020-10-21 PROCEDURE — 99214 OFFICE O/P EST MOD 30 MIN: CPT | Performed by: SPECIALIST

## 2020-10-21 RX ORDER — PROPAFENONE HYDROCHLORIDE 150 MG/1
150 TABLET, FILM COATED ORAL 2 TIMES DAILY
Qty: 180 TAB | Refills: 3 | Status: SHIPPED | OUTPATIENT
Start: 2020-10-21 | End: 2021-08-23

## 2020-10-21 NOTE — PROGRESS NOTES
Visit Vitals  /72 (BP 1 Location: Left arm, BP Patient Position: Sitting)   Pulse 78   Resp 18   Ht 5' 10\" (1.778 m)   Wt 169 lb (76.7 kg)   LMP  (LMP Unknown)   SpO2 98%   BMI 24.25 kg/m²

## 2020-10-21 NOTE — PROGRESS NOTES
HISTORY OF PRESENT ILLNESS  Breana Soliz is a 64 y.o. female. She has a history of paroxysmal atrial fibrillation but she has remained in sinus rhythm on propafenone and diltiazem. On her own she tried stopping the diltiazem and has no adverse effects. She then tried stopping the propafenone and had an increase in palpitations. She wore a monitor and had only some premature atrial and ventricular beats. She still has occasional palpitations. 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 180 Tab 3    dilTIAZem ER (CARDIZEM CD) 120 mg capsule TAKE 1 CAPSULE BY MOUTH EVERY DAY 30 Cap 2    loratadine (CLARITIN) 10 mg tablet Take 10 mg by mouth daily as needed. Past Medical History:   Diagnosis Date    Advance directive discussed with patient 09/13/2016    has info    Endometriosis     GERD (gastroesophageal reflux disease) 07/17/2017    GI eval note Pollo Vu post visit to ED for atypical chest pains    Menopause     LMP-Dec. 2004    Murmur     Reflux esophagitis 07/27/2017 initial eval note    7/27/17 Wing Hunt note and 8/1/17 EGD report    Tick bite 06/19/2017    Removed with no problems.     Tongue lesion 4/27/16    area removed [facility]    Vertigo     Vitamin D deficiency     Vocal cord dysfunction     due to significant allergies     Past Surgical History:   Procedure Laterality Date    ABDOMEN SURGERY PROC UNLISTED  1989    cyst removed with right ovary    HX ABDOMINAL LAPAROSCOPY      x 2    HX COLONOSCOPY  2007    HX COLONOSCOPY  7/26/16    10 yr repeat Leon Venegas HX ENDOSCOPY  08/01/2017    Mati Meyers  path report rec'd    HX HYSTERECTOMY  12/2004    HX OTHER SURGICAL  4/27/16 path pend    lesion on left lateral border of tongue    HX OTHER SURGICAL      basal cell face-2005([facility])    HX RETINAL DETACHMENT REPAIR      HX RIGHT SALPINGO-OOPHORECTOMY  1980's    secondary to mass       Review of Systems   Cardiovascular: Positive for palpitations. All other systems reviewed and are negative. Visit Vitals  /72 (BP 1 Location: Left arm, BP Patient Position: Sitting)   Pulse 78   Resp 18   Ht 5' 10\" (1.778 m)   Wt 169 lb (76.7 kg)   LMP  (LMP Unknown)   SpO2 98%   BMI 24.25 kg/m²       Physical Exam   Constitutional: She is oriented to person, place, and time. She appears well-nourished. HENT:   Head: Atraumatic. Eyes: Conjunctivae are normal.   Neck: Neck supple. Cardiovascular: Normal rate, regular rhythm and normal heart sounds. Exam reveals no gallop and no friction rub. No murmur heard. Pulmonary/Chest: Breath sounds normal. She has no wheezes. She has no rales. Musculoskeletal:         General: No edema. Neurological: She is oriented to person, place, and time. Skin: Skin is dry. Psychiatric: Her behavior is normal.   Nursing note and vitals reviewed. ASSESSMENT and PLAN  In general she is doing well but her blood pressure is quite low. I suggested that she again try stopping the diltiazem and let us know how she is doing. I do think the propafenone is much more important in preventing the atrial fibrillation. I will see her back in 6 months.

## 2020-11-04 ENCOUNTER — TRANSCRIBE ORDER (OUTPATIENT)
Dept: SCHEDULING | Age: 60
End: 2020-11-04

## 2020-11-04 DIAGNOSIS — Z12.31 VISIT FOR SCREENING MAMMOGRAM: Primary | ICD-10-CM

## 2020-11-17 ENCOUNTER — PATIENT MESSAGE (OUTPATIENT)
Dept: CARDIOLOGY CLINIC | Age: 60
End: 2020-11-17

## 2020-11-17 DIAGNOSIS — I48.0 PAROXYSMAL ATRIAL FIBRILLATION (HCC): Primary | ICD-10-CM

## 2020-11-18 RX ORDER — DILTIAZEM HYDROCHLORIDE 120 MG/1
CAPSULE, COATED, EXTENDED RELEASE ORAL
Qty: 30 CAP | Refills: 2 | Status: SHIPPED | OUTPATIENT
Start: 2020-11-18 | End: 2021-01-28

## 2020-11-18 NOTE — TELEPHONE ENCOUNTER
Patient sent [employer] message stating she tried being off diltiazem, but didn't work out so she resumed taking it. Dr. Nani Geiger said okay to fill for her to continue.      Requested Prescriptions     Signed Prescriptions Disp Refills    dilTIAZem ER (CARDIZEM CD) 120 mg capsule 30 Cap 2     Sig: TAKE 1 CAPSULE BY MOUTH EVERY DAY     Authorizing Provider: Tyson Blizzard     Ordering User: Vangie Bonilla    Per Dr. Sarah Finch verbal order

## 2020-11-20 ENCOUNTER — HOSPITAL ENCOUNTER (OUTPATIENT)
Dept: MAMMOGRAPHY | Age: 60
Discharge: HOME | End: 2020-11-20
Attending: FAMILY MEDICINE
Payer: COMMERCIAL

## 2020-11-20 DIAGNOSIS — Z12.31 VISIT FOR SCREENING MAMMOGRAM: ICD-10-CM

## 2020-11-20 PROCEDURE — 77063 BREAST TOMOSYNTHESIS BI: CPT

## 2021-01-28 DIAGNOSIS — I48.0 PAROXYSMAL ATRIAL FIBRILLATION (HCC): ICD-10-CM

## 2021-01-28 RX ORDER — DILTIAZEM HYDROCHLORIDE 120 MG/1
CAPSULE, COATED, EXTENDED RELEASE ORAL
Qty: 90 CAP | Refills: 1 | Status: SHIPPED | OUTPATIENT
Start: 2021-01-28 | End: 2021-11-23 | Stop reason: SINTOL

## 2021-02-10 ENCOUNTER — TRANSCRIBE ORDER (OUTPATIENT)
Dept: GENERAL RADIOLOGY | Age: 61
End: 2021-02-10

## 2021-02-10 ENCOUNTER — HOSPITAL ENCOUNTER (OUTPATIENT)
Dept: GENERAL RADIOLOGY | Age: 61
Discharge: HOME | End: 2021-02-10
Attending: CHIROPRACTOR
Payer: COMMERCIAL

## 2021-02-10 DIAGNOSIS — E70.319 OA (OCULAR ALBINISM) (HCC): Primary | ICD-10-CM

## 2021-02-10 DIAGNOSIS — E70.319 OA (OCULAR ALBINISM) (HCC): ICD-10-CM

## 2021-02-10 PROCEDURE — 73030 X-RAY EXAM OF SHOULDER: CPT

## 2021-03-18 ENCOUNTER — IMMUNIZATION (OUTPATIENT)
Dept: INTERNAL MEDICINE CLINIC | Age: 61
End: 2021-03-18
Payer: COMMERCIAL

## 2021-03-18 DIAGNOSIS — Z23 ENCOUNTER FOR IMMUNIZATION: Primary | ICD-10-CM

## 2021-03-18 PROCEDURE — 91300 COVID-19, MRNA, LNP-S, PF, 30MCG/0.3ML DOSE(PFIZER): CPT | Performed by: FAMILY MEDICINE

## 2021-03-18 PROCEDURE — 0001A COVID-19, MRNA, LNP-S, PF, 30MCG/0.3ML DOSE(PFIZER): CPT | Performed by: FAMILY MEDICINE

## 2021-03-21 ENCOUNTER — HOSPITAL ENCOUNTER (EMERGENCY)
Age: 61
Discharge: HOME | End: 2021-03-21
Attending: EMERGENCY MEDICINE
Payer: COMMERCIAL

## 2021-03-21 VITALS
RESPIRATION RATE: 16 BRPM | SYSTOLIC BLOOD PRESSURE: 117 MMHG | HEART RATE: 67 BPM | TEMPERATURE: 97.1 F | DIASTOLIC BLOOD PRESSURE: 80 MMHG | OXYGEN SATURATION: 98 %

## 2021-03-21 DIAGNOSIS — R06.00 DYSPNEA, UNSPECIFIED TYPE: ICD-10-CM

## 2021-03-21 DIAGNOSIS — R23.2 HOT FLASHES: Primary | ICD-10-CM

## 2021-03-21 LAB
ALBUMIN SERPL-MCNC: 4 G/DL (ref 3.5–5)
ALBUMIN/GLOB SERPL: 1.1 {RATIO} (ref 1.1–2.2)
ALP SERPL-CCNC: 104 U/L (ref 45–117)
ALT SERPL-CCNC: 39 U/L (ref 12–78)
ANION GAP SERPL CALC-SCNC: 2 MMOL/L (ref 5–15)
APPEARANCE UR: CLEAR
AST SERPL-CCNC: 17 U/L (ref 15–37)
ATRIAL RATE: 70 BPM
BASOPHILS # BLD: 0 K/UL (ref 0–0.1)
BASOPHILS NFR BLD: 1 % (ref 0–1)
BILIRUB SERPL-MCNC: 0.4 MG/DL (ref 0.2–1)
BILIRUB UR QL: NEGATIVE
BUN SERPL-MCNC: 14 MG/DL (ref 6–20)
BUN/CREAT SERPL: 17 (ref 12–20)
CALCIUM SERPL-MCNC: 9 MG/DL (ref 8.5–10.1)
CALCULATED P AXIS, ECG09: 87 DEGREES
CALCULATED R AXIS, ECG10: 89 DEGREES
CALCULATED T AXIS, ECG11: 68 DEGREES
CHLORIDE SERPL-SCNC: 110 MMOL/L (ref 97–108)
CO2 SERPL-SCNC: 28 MMOL/L (ref 21–32)
COLOR UR: NORMAL
COMMENT, HOLDF: NORMAL
CREAT SERPL-MCNC: 0.82 MG/DL (ref 0.55–1.02)
DIAGNOSIS, 93000: NORMAL
DIFFERENTIAL METHOD BLD: NORMAL
EOSINOPHIL # BLD: 0 K/UL (ref 0–0.4)
EOSINOPHIL NFR BLD: 1 % (ref 0–7)
ERYTHROCYTE [DISTWIDTH] IN BLOOD BY AUTOMATED COUNT: 12.3 % (ref 11.5–14.5)
GLOBULIN SER CALC-MCNC: 3.6 G/DL (ref 2–4)
GLUCOSE SERPL-MCNC: 112 MG/DL (ref 65–100)
GLUCOSE UR STRIP.AUTO-MCNC: NEGATIVE MG/DL
HCT VFR BLD AUTO: 41.4 % (ref 35–47)
HGB BLD-MCNC: 13.5 G/DL (ref 11.5–16)
HGB UR QL STRIP: NEGATIVE
IMM GRANULOCYTES # BLD AUTO: 0 K/UL (ref 0–0.04)
IMM GRANULOCYTES NFR BLD AUTO: 0 % (ref 0–0.5)
KETONES UR QL STRIP.AUTO: NEGATIVE MG/DL
LEUKOCYTE ESTERASE UR QL STRIP.AUTO: NEGATIVE
LYMPHOCYTES # BLD: 1.1 K/UL (ref 0.8–3.5)
LYMPHOCYTES NFR BLD: 18 % (ref 12–49)
MAGNESIUM SERPL-MCNC: 2.3 MG/DL (ref 1.6–2.4)
MCH RBC QN AUTO: 29.8 PG (ref 26–34)
MCHC RBC AUTO-ENTMCNC: 32.6 G/DL (ref 30–36.5)
MCV RBC AUTO: 91.4 FL (ref 80–99)
MONOCYTES # BLD: 0.3 K/UL (ref 0–1)
MONOCYTES NFR BLD: 5 % (ref 5–13)
NEUTS SEG # BLD: 4.4 K/UL (ref 1.8–8)
NEUTS SEG NFR BLD: 75 % (ref 32–75)
NITRITE UR QL STRIP.AUTO: NEGATIVE
NRBC # BLD: 0 K/UL (ref 0–0.01)
NRBC BLD-RTO: 0 PER 100 WBC
P-R INTERVAL, ECG05: 142 MS
PH UR STRIP: 7 [PH] (ref 5–8)
PLATELET # BLD AUTO: 212 K/UL (ref 150–400)
PMV BLD AUTO: 9.9 FL (ref 8.9–12.9)
POTASSIUM SERPL-SCNC: 3.8 MMOL/L (ref 3.5–5.1)
PROT SERPL-MCNC: 7.6 G/DL (ref 6.4–8.2)
PROT UR STRIP-MCNC: NEGATIVE MG/DL
Q-T INTERVAL, ECG07: 396 MS
QRS DURATION, ECG06: 94 MS
QTC CALCULATION (BEZET), ECG08: 427 MS
RBC # BLD AUTO: 4.53 M/UL (ref 3.8–5.2)
SAMPLES BEING HELD,HOLD: NORMAL
SODIUM SERPL-SCNC: 140 MMOL/L (ref 136–145)
SP GR UR REFRACTOMETRY: <1.005 (ref 1–1.03)
TROPONIN I SERPL-MCNC: <0.05 NG/ML
UR CULT HOLD, URHOLD: NORMAL
UROBILINOGEN UR QL STRIP.AUTO: 0.2 EU/DL (ref 0.2–1)
VENTRICULAR RATE, ECG03: 70 BPM
WBC # BLD AUTO: 5.8 K/UL (ref 3.6–11)

## 2021-03-21 PROCEDURE — 99283 EMERGENCY DEPT VISIT LOW MDM: CPT | Performed by: INTERNAL MEDICINE

## 2021-03-21 PROCEDURE — 85025 COMPLETE CBC W/AUTO DIFF WBC: CPT

## 2021-03-21 PROCEDURE — 93005 ELECTROCARDIOGRAM TRACING: CPT

## 2021-03-21 PROCEDURE — 84484 ASSAY OF TROPONIN QUANT: CPT

## 2021-03-21 PROCEDURE — 83735 ASSAY OF MAGNESIUM: CPT

## 2021-03-21 PROCEDURE — 80053 COMPREHEN METABOLIC PANEL: CPT

## 2021-03-21 PROCEDURE — 99284 EMERGENCY DEPT VISIT MOD MDM: CPT

## 2021-03-21 PROCEDURE — 36415 COLL VENOUS BLD VENIPUNCTURE: CPT

## 2021-03-21 PROCEDURE — 81003 URINALYSIS AUTO W/O SCOPE: CPT

## 2021-03-21 NOTE — PROGRESS NOTES
HISTORY OF PRESENT ILLNESS  Breana Andino is a 63 y.o. female. She has a history of paroxysmal atrial fibrillation but she has remained in sinus rhythm on propafenone and diltiazem. On her own she tried stopping the diltiazem and has no adverse effects. She then tried stopping the propafenone and had an increase in palpitations. She wore a monitor and had only some premature atrial and ventricular beats. She still has occasional palpitations    This am woke up with abd distress and irregular heartbeats, flushing, nausea ad some dyspnea. Took her propafenone ad it persisted. I spoke with her and she was concerned, her friend said that she looked very poorly. She took her Dilt early and then since not feeling better came to er. Now feeling better, and nothing bad has shown up on her tele or labs. Has had some fatigue, nl stress test. BP is good. Chest Pain (Angina)   Associated symptoms include palpitations and shortness of breath. Shortness of Breath  Associated symptoms include chest pain. 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 180 Tab 3    loratadine (CLARITIN) 10 mg tablet Take 10 mg by mouth daily as needed.        Past Medical History:   Diagnosis Date    Advance directive discussed with patient 09/13/2016    has info    Endometriosis     GERD (gastroesophageal reflux disease) 07/17/2017 GI eval note Willis Dalal post visit to ED for atypical chest pains    Menopause     LMP-Dec. 2004    Murmur     Reflux esophagitis 07/27/2017 initial eval note    7/27/17 [facility] note and 8/1/17 EGD report    Tick bite 06/19/2017    Removed with no problems.  Tongue lesion 4/27/16    area removed per note from [facility]    Vertigo     Vitamin D deficiency     Vocal cord dysfunction     due to significant allergies     Past Surgical History:   Procedure Laterality Date    HX ABDOMINAL LAPAROSCOPY      x 2    HX COLONOSCOPY  2007    HX COLONOSCOPY  7/26/16    10 yr repeat Zoraida Wheatley    HX ENDOSCOPY  08/01/2017    Zoraida Wheatley  path report rec'd    HX HYSTERECTOMY  12/2004    HX OTHER SURGICAL  4/27/16 path pend    lesion on left lateral border of tongue    HX OTHER SURGICAL      basal cell face-2005([facility]    HX RETINAL DETACHMENT REPAIR      HX RIGHT SALPINGO-OOPHORECTOMY  1980's    secondary to mass    [place] ABDOMEN SURGERY PROC UNLISTED  1989    cyst removed with right ovary       Review of Systems   Respiratory: Positive for shortness of breath. Cardiovascular: Positive for chest pain and palpitations. All other systems reviewed and are negative. Visit Vitals  /80   Pulse 67   Temp 97.1 °F (36.2 °C)   Resp 16   LMP  (LMP Unknown)   SpO2 98%       Physical Exam   Constitutional: She is oriented to person, place, and time. She appears well-nourished. HENT:   Head: Atraumatic. Eyes: Conjunctivae are normal.   Neck: Neck supple. Cardiovascular: Normal rate, regular rhythm and normal heart sounds. Exam reveals no gallop and no friction rub. No murmur heard. Pulmonary/Chest: Breath sounds normal. She has no wheezes. She has no rales. Musculoskeletal:         General: No edema. Neurological: She is oriented to person, place, and time. Skin: Skin is dry. Psychiatric: Her behavior is normal.   Nursing note and vitals reviewed.       ASSESSMENT and PLAN  Fu requested with dr gonzalez to review mgmt   Fu with dr Philomena Alva as requested  Ok to [facility]

## 2021-03-21 NOTE — ED TRIAGE NOTES
Patient arrives with her sister with c/o heart palpitations, diarrhea, chills, and shortness of breath. Patient has history of afib. Patient called her cardiologist Dr. Jonnie Cisneros this morning and was told to take her diltiazem and if she didn't feel better to come to the ER. Patient states she is feeling better now but wanted to get it checked out.

## 2021-03-21 NOTE — ED NOTES
Pt voiced understanding of d/c instructions; is established with a cardiologist.  Pt. Left dept in no acute distress.

## 2021-03-21 NOTE — ED PROVIDER NOTES
This is a 68-year-old female with a history of paroxysmal atrial fibrillation for which she has been seen by Dr. John Emmanuel. There is no history of coronary artery disease. She does have reflux but no other acute or chronic medical illnesses. She states that on Thursday she got a dose of Pfizer vaccine for [employer]. She was feeling badly on Friday but then yesterday was feeling okay. Today she woke up about 4:00 and had some hot sweats and had to go to the bathroom immediately to move her bowel. She had 5 of those episodes and stated her stool got a little softer each time. She had little intermittent shortness of breath. There was no chest pain and no abdominal pain. She would have these spells that would come and go and she felt her heartbeat hard at one point and felt that she may have gone in and out of an episode of A. fib. She decided this morning that she probably ought to be checked she did speak with Dr. Jonnie Cisneros this morning who is aware that she is here. Past Medical History:   Diagnosis Date    Advance directive discussed with patient 09/13/2016    has info    Endometriosis     GERD (gastroesophageal reflux disease) 07/17/2017    GI eval note Shanique Crowder post visit to ED for atypical chest pains    Menopause     LMP-Dec. 2004    Murmur     Reflux esophagitis 07/27/2017 initial eval note    7/27/17 Karthik Bloom note and 8/1/17 EGD report    Tick bite 06/19/2017    Removed with no problems.     Tongue lesion 4/27/16    [place] [facility]    Vertigo     Vitamin D deficiency     Vocal cord dysfunction     due to significant allergies       Past Surgical History:   Procedure Laterality Date    HX ABDOMINAL LAPAROSCOPY      x 2    HX COLONOSCOPY  2007    HX COLONOSCOPY  7/26/16    10 yr repeat [facility]    HX ENDOSCOPY  08/01/2017    [facility]  path report rec'd    HX HYSTERECTOMY  12/2004    HX OTHER SURGICAL  4/27/16 path pend    lesion on left lateral border of tongue    HX OTHER SURGICAL      basal cell face-2005(in [facility])    HX RETINAL DETACHMENT REPAIR      HX RIGHT SALPINGO-OOPHORECTOMY  1980's    secondary to mass    [place] ABDOMEN SURGERY PROC UNLISTED  1989    cyst removed with right ovary         History reviewed. No pertinent family history. Social History     Socioeconomic History    Marital status: SINGLE     Spouse name: Not on file    Number of children: Not on file    Years of education: Not on file    Highest education level: Not on file   Occupational History    Not on file   Social Needs    Financial resource strain: Not on file    Food insecurity     Worry: Not on file     Inability: Not on file    Transportation needs     Medical: Not on file     Non-medical: Not on file   Tobacco Use    Smoking status: Never Smoker    Smokeless tobacco: Never Used   Substance and Sexual Activity    Alcohol use: No    Drug use: No    Sexual activity: Not on file   Lifestyle    Physical activity     Days per week: Not on file     Minutes per session: Not on file    Stress: Not on file   Relationships    Social connections     Talks on phone: Not on file     Gets together: Not on file     Attends Jainism service: Not on file     Active member of club or organization: Not on file     Attends meetings of clubs or organizations: Not on file     Relationship status: Not on file    Intimate partner violence     Fear of current or ex partner: Not on file     Emotionally abused: Not on file     Physically abused: Not on file     Forced sexual activity: Not on file   Other Topics Concern    Not on file   Social History Narrative    Not on file         ALLERGIES: Morphine and Penicillins    Review of Systems   Constitutional: Negative for activity change, appetite change, chills, fatigue and fever. Patient had several hot sweats and a slight shortness of breath with each bowel movement.    HENT: Negative for ear pain, facial swelling, sore throat and trouble swallowing. Eyes: Negative for pain, discharge and visual disturbance. Respiratory: Negative for chest tightness, shortness of breath and wheezing. Cardiovascular: Negative for chest pain and palpitations. Gastrointestinal: Negative for abdominal pain, blood in stool, nausea and vomiting. Frequent stools x5 this morning. Genitourinary: Negative for difficulty urinating, flank pain and hematuria. Musculoskeletal: Negative for arthralgias, joint swelling, myalgias and neck pain. Skin: Negative for color change and rash. Neurological: Negative for dizziness, weakness, numbness and headaches. Hematological: Negative for adenopathy. Does not bruise/bleed easily. Psychiatric/Behavioral: Negative for behavioral problems, confusion and sleep disturbance. All other systems reviewed and are negative. Vitals:    03/21/21 0853   BP: 139/79   Pulse: 70   Resp: 16   Temp: 97.1 °F (36.2 °C)   SpO2: 99%            Physical Exam  Vitals signs and nursing note reviewed. Constitutional:       General: She is not in acute distress. Appearance: She is well-developed. HENT:      Head: Normocephalic and atraumatic. Nose: Nose normal.   Eyes:      General: No scleral icterus. Conjunctiva/sclera: Conjunctivae normal.      Pupils: Pupils are equal, round, and reactive to light. Neck:      Musculoskeletal: Normal range of motion and neck supple. Thyroid: No thyromegaly. Vascular: No JVD. Trachea: No tracheal deviation. Comments: No carotid bruits noted. Cardiovascular:      Rate and Rhythm: Normal rate and regular rhythm. Heart sounds: Normal heart sounds. No murmur. No friction rub. No gallop. Pulmonary:      Effort: Pulmonary effort is normal. No respiratory distress. Breath sounds: Normal breath sounds. No wheezing or rales. Chest:      Chest wall: No tenderness. Abdominal:      General: Bowel sounds are normal. There is no distension. Palpations: Abdomen is soft. There is no mass. Tenderness: There is no abdominal tenderness. There is no guarding or rebound. Musculoskeletal: Normal range of motion. General: No tenderness. Lymphadenopathy:      Cervical: No cervical adenopathy. Skin:     General: Skin is warm and dry. Findings: No erythema or rash. Neurological:      Mental Status: She is alert and oriented to person, place, and time. Cranial Nerves: No cranial nerve deficit. Coordination: Coordination normal.      Deep Tendon Reflexes: Reflexes are normal and symmetric. Psychiatric:         Behavior: Behavior normal.         Thought Content: Thought content normal.         Judgment: Judgment normal.          MDM  Number of Diagnoses or Management Options     Amount and/or Complexity of Data Reviewed  Clinical lab tests: ordered and reviewed  Decide to obtain previous medical records or to obtain history from someone other than the patient: yes  Review and summarize past medical records: yes  Discuss the patient with other providers: yes    Risk of Complications, Morbidity, and/or Mortality  Presenting problems: high  Diagnostic procedures: high  Management options: moderate    Patient Progress  Patient progress: stable         Procedures    ED MD EKG interpretation, normal sinus rhythm with a rate at 70 beats a minute. Normal axis. No ectopy or acute ischemic changes noted. Steven Elaine MD     Patient is in no acute distress in the ED. She is feeling back to normal.  Vital signs, sats and rhythms were all normal.  Her EKG is unremarkable. Will check baseline labs and then discussed with Dr. Kristina Murphy. Concerned that she may have had a reaction based on vaccine with the sweats and frequent bowel movements. Would not of had an episode primarily of A. fib and produces symptoms. Is completely asymptomatic at this time. The patient's labs and EKG all appear to be unremarkable.   She is having no pain at the present time and no difficulty breathing. It is unclear exactly what prompted the patient's symptoms this morning with the hot flashes and was sweating with the bowel movements, however, it would not appear that there is any cardiac etiology for any of that. The question is whether or not she had some type of reaction to the vaccine which may have precipitated the hot flashes and the bowel movements. Dr. Anita Hicks had suggested she come to the hospital and she will check her very briefly shortly. Patient will likely be discharged home. No acute findings noted. ? Vaccine effect. Patient was seen by Dr. Anita Hicks. She will be discharged home to follow up with own MD if continued difficulty and return if symptoms worsen.

## 2021-03-21 NOTE — DISCHARGE INSTRUCTIONS
Continue your current medications and follow-up with your own physicians as discussed. Return if worsening of symptoms.

## 2021-04-08 ENCOUNTER — IMMUNIZATION (OUTPATIENT)
Dept: INTERNAL MEDICINE CLINIC | Age: 61
End: 2021-04-08
Payer: COMMERCIAL

## 2021-04-08 DIAGNOSIS — Z23 ENCOUNTER FOR IMMUNIZATION: Primary | ICD-10-CM

## 2021-04-08 PROCEDURE — 91300 COVID-19, MRNA, LNP-S, PF, 30MCG/0.3ML DOSE(PFIZER): CPT | Performed by: FAMILY MEDICINE

## 2021-04-08 PROCEDURE — 0002A COVID-19, MRNA, LNP-S, PF, 30MCG/0.3ML DOSE(PFIZER): CPT | Performed by: FAMILY MEDICINE

## 2021-04-21 ENCOUNTER — OFFICE VISIT (OUTPATIENT)
Dept: CARDIOLOGY CLINIC | Age: 61
End: 2021-04-21
Payer: COMMERCIAL

## 2021-04-21 VITALS
RESPIRATION RATE: 18 BRPM | BODY MASS INDEX: 25.34 KG/M2 | WEIGHT: 177 LBS | SYSTOLIC BLOOD PRESSURE: 118 MMHG | DIASTOLIC BLOOD PRESSURE: 70 MMHG | HEART RATE: 66 BPM | HEIGHT: 70 IN

## 2021-04-21 DIAGNOSIS — R00.2 PALPITATIONS: ICD-10-CM

## 2021-04-21 DIAGNOSIS — T50.905D DRUG SIDE EFFECTS, SUBSEQUENT ENCOUNTER: ICD-10-CM

## 2021-04-21 DIAGNOSIS — I48.0 PAROXYSMAL ATRIAL FIBRILLATION (HCC): Primary | ICD-10-CM

## 2021-04-21 DIAGNOSIS — Z88.9 MULTIPLE ALLERGIES: ICD-10-CM

## 2021-04-21 PROCEDURE — 99214 OFFICE O/P EST MOD 30 MIN: CPT | Performed by: SPECIALIST

## 2021-04-21 NOTE — PROGRESS NOTES
HISTORY OF PRESENT ILLNESS  Breana Serna is a 63 y.o. female. She has a history of paroxysmal atrial fibrillation. She has been on propafenone to prevent recurrence but when I saw her last she was having relatively low blood pressure and I stopped her diltiazem. She seemed to have more palpitations and she called and we restarted the medication. 1 month ago she woke up at 4 AM and felt hot she had 5 bowel movements that morning which in the past is indicated possible atrial fibrillation. She has been having a lot of seasonal allergies but also she has severe dust mite allergy when she stays in. She also has low back pain. Her weight is up 6 to 8 pounds over the past half year and she has been eating junk food. On her own she stopped taking Claritin and she feels better. She has received the coronavirus vaccine. 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 180 Tab 3    loratadine (CLARITIN) 10 mg tablet Take 10 mg by mouth daily as needed.        Past Medical History:   Diagnosis Date    Advance directive discussed with patient 09/13/2016    has info    Endometriosis     GERD (gastroesophageal reflux disease) 07/17/2017    GI eval note Jim Ayala post visit to ED for atypical chest pains    Menopause     LMP-Dec. 2004    Murmur     Reflux esophagitis 07/27/2017 initial eval note    7/27/17 Arely Villafana Misa Champion note and 8/1/17 EGD report    Tick bite 06/19/2017    Removed with no problems.  Tongue lesion 4/27/16    area removed per note from [facility]    Vertigo     Vitamin D deficiency     Vocal cord dysfunction     due to significant allergies     Past Surgical History:   Procedure Laterality Date    HX ABDOMINAL LAPAROSCOPY      x 2    HX COLONOSCOPY  2007    HX COLONOSCOPY  7/26/16    10 yr repeat Misa Champion    HX ENDOSCOPY  08/01/2017    Misa Champion  path report rec'd    HX HYSTERECTOMY  12/2004    HX OTHER SURGICAL  4/27/16 path pend    lesion on left lateral border of tongue    HX OTHER SURGICAL      basal cell face-2005(in [facility])    HX RETINAL DETACHMENT REPAIR      HX RIGHT SALPINGO-OOPHORECTOMY  1980's    secondary to mass    [place] ABDOMEN SURGERY PROC UNLISTED  1989    cyst removed with right ovary       Review of Systems   HENT: Positive for congestion. Cardiovascular: Positive for palpitations. Musculoskeletal: Positive for back pain. All other systems reviewed and are negative. Visit Vitals  /70 (BP 1 Location: Right arm, BP Patient Position: Sitting, BP Cuff Size: Adult)   Pulse 66   Resp 18   Ht 5' 10\" (1.778 m)   Wt 177 lb (80.3 kg)   LMP  (LMP Unknown)   BMI 25.40 kg/m²       Physical Exam   Constitutional: She is oriented to person, place, and time. She appears well-nourished. HENT:   Head: Atraumatic. Eyes: Conjunctivae are normal.   Neck: Neck supple. Cardiovascular: Normal rate, regular rhythm and normal heart sounds. Exam reveals no gallop and no friction rub. No murmur heard. Pulmonary/Chest: Breath sounds normal. She has no wheezes. She has no rales. Abdominal: Bowel sounds are normal.   Musculoskeletal:         General: No edema. Neurological: She is oriented to person, place, and time. Skin: Skin is dry. Psychiatric: Her behavior is normal.   Nursing note and vitals reviewed.       ASSESSMENT and PLAN  She seems to have multiple side effects from medications as well as multiple allergies. She will try to cut back on the junk food. On her own she may try to cut the diltiazem out now that she has stopped Claritin. She remains in sinus rhythm to exam.  I will plan to see her back in 6 months or sooner if necessary.

## 2021-06-15 ENCOUNTER — DOCUMENTATION ONLY (OUTPATIENT)
Dept: CARDIOLOGY CLINIC | Age: 61
End: 2021-06-15

## 2021-06-15 NOTE — PROGRESS NOTES
Started diltiazem over the weekend for more palps, and today bp is in the 90's. Was afraid to take more propafenone, though I told her cardizem was prob lowering bp. She will stop diltiazem and maybe hold dose of propafenone tonight. Sounds like she is set up with 48hr holter in near future.  Just call and check on her

## 2021-06-16 ENCOUNTER — TELEPHONE (OUTPATIENT)
Dept: CARDIOLOGY CLINIC | Age: 61
End: 2021-06-16

## 2021-06-16 DIAGNOSIS — I48.0 PAROXYSMAL ATRIAL FIBRILLATION (HCC): Primary | ICD-10-CM

## 2021-06-16 NOTE — PROGRESS NOTES
We have been communicating with patient via ibeatyou also. She had wanted to hold off on monitor. Sent another ib[employer]you message to patient about monitor.

## 2021-06-22 ENCOUNTER — TELEPHONE (OUTPATIENT)
Dept: CARDIOLOGY CLINIC | Age: 61
End: 2021-06-22

## 2021-06-22 NOTE — TELEPHONE ENCOUNTER
Patient has questions about her appointment today.  She feels she does not need the monitor but requesting advice prior to canceling         PHONE:[phone]

## 2021-06-22 NOTE — TELEPHONE ENCOUNTER
Called patient. Verified patient's identity with two identifiers. Patient stated she has been propping herself up with pillows at night and not waking up with sob or palpitations. She would like to cancel holter appointment and call back if sxs reoccur to r/s. I told her that was fine. Canceled holter appointment. Patient verbalized understanding and denied further questions or concerns.

## 2021-07-08 ENCOUNTER — CLINICAL SUPPORT (OUTPATIENT)
Dept: CARDIOLOGY CLINIC | Age: 61
End: 2021-07-08
Payer: COMMERCIAL

## 2021-07-08 DIAGNOSIS — I48.0 PAROXYSMAL ATRIAL FIBRILLATION (HCC): Primary | ICD-10-CM

## 2021-07-08 PROCEDURE — 93227 XTRNL ECG REC<48 HR R&I: CPT | Performed by: INTERNAL MEDICINE

## 2021-08-23 DIAGNOSIS — I48.0 PAROXYSMAL ATRIAL FIBRILLATION (HCC): Primary | ICD-10-CM

## 2021-08-23 RX ORDER — PROPAFENONE HYDROCHLORIDE 150 MG/1
TABLET, FILM COATED ORAL
Qty: 180 TABLET | Refills: 0 | Status: SHIPPED | OUTPATIENT
Start: 2021-08-23 | End: 2022-01-31

## 2021-08-23 NOTE — TELEPHONE ENCOUNTER
Requested Prescriptions     Signed Prescriptions Disp Refills    propafenone (RYTHMOL) 150 mg tablet 180 Tablet 0     Sig: TAKE 1 TABLET BY MOUTH TWICE A DAY     Authorizing Provider: Jane Lovett     Ordering User: Skinny Anguiano    Per Dr. Coco Norwood verbal order     Future Appointments   Date Time Provider Albin Amaral   10/21/2021  1:00 PM MD HARSH Diggs AMB

## 2021-10-05 ENCOUNTER — TELEPHONE (OUTPATIENT)
Dept: CARDIOLOGY CLINIC | Age: 61
End: 2021-10-05

## 2021-10-05 NOTE — TELEPHONE ENCOUNTER
Left message requesting a call back to reschedule upcoming appointment on 10/21 as Dr. Solomon Whaley will not be in the office please reschedule to next available.  Thanks

## 2021-11-23 ENCOUNTER — OFFICE VISIT (OUTPATIENT)
Dept: CARDIOLOGY CLINIC | Age: 61
End: 2021-11-23
Payer: COMMERCIAL

## 2021-11-23 VITALS
RESPIRATION RATE: 16 BRPM | WEIGHT: 180 LBS | OXYGEN SATURATION: 98 % | DIASTOLIC BLOOD PRESSURE: 70 MMHG | BODY MASS INDEX: 25.77 KG/M2 | SYSTOLIC BLOOD PRESSURE: 118 MMHG | HEART RATE: 78 BPM | HEIGHT: 70 IN

## 2021-11-23 DIAGNOSIS — K21.9 GASTROESOPHAGEAL REFLUX DISEASE WITHOUT ESOPHAGITIS: ICD-10-CM

## 2021-11-23 DIAGNOSIS — I49.9 IRREGULAR HEART BEATS: ICD-10-CM

## 2021-11-23 DIAGNOSIS — R00.2 PALPITATIONS: ICD-10-CM

## 2021-11-23 DIAGNOSIS — Z88.9 MULTIPLE ALLERGIES: ICD-10-CM

## 2021-11-23 DIAGNOSIS — I48.0 PAROXYSMAL ATRIAL FIBRILLATION (HCC): Primary | ICD-10-CM

## 2021-11-23 DIAGNOSIS — T50.905D DRUG SIDE EFFECTS, SUBSEQUENT ENCOUNTER: ICD-10-CM

## 2021-11-23 PROCEDURE — 99214 OFFICE O/P EST MOD 30 MIN: CPT | Performed by: SPECIALIST

## 2021-11-23 RX ORDER — FAMOTIDINE 10 MG/1
10 TABLET ORAL DAILY
COMMUNITY
End: 2021-11-23 | Stop reason: ALTCHOICE

## 2021-11-23 RX ORDER — CLINDAMYCIN PHOSPHATE 10 MG/ML
SOLUTION TOPICAL AS NEEDED
COMMUNITY
Start: 2021-11-22 | End: 2022-02-08

## 2021-11-23 RX ORDER — PANTOPRAZOLE SODIUM 20 MG/1
20 TABLET, DELAYED RELEASE ORAL DAILY
Qty: 30 TABLET | Refills: 11 | Status: SHIPPED | OUTPATIENT
Start: 2021-11-23 | End: 2021-12-17

## 2021-11-23 NOTE — PROGRESS NOTES
HISTORY OF PRESENT ILLNESS  Norine Severiano Beaver is a 63 y.o. female. She has a history of paroxysmal atrial fibrillation with extremely low risk for peripheral embolism. She has been on propafenone for some time and also tried Cardizem but it made her feel bad. She does have intolerance to multiple medications. She also has gastroesophageal reflux and it seems to be flaring up recently. She cannot lie on her back without discomfort from this and reflux has to sleep sitting up. If she lies on her left side she will have palpitations. She was last here 6 months ago but may have had an episode in early November of atrial fibrillation although she wears an apple watch which did not detected. No longer takes the Cardizem. She has used Carafate in the past but currently has only taking Pepcid over-the-counter for her reflux. In her mind the reflux symptoms seem to trigger the irregular heartbeat. 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  pantoprazole (PROTONIX) 20 mg tablet Take 1 Tablet by mouth daily. 30 Tablet 11    propafenone (RYTHMOL) 150 mg tablet TAKE 1 TABLET BY MOUTH TWICE A  Tablet 0    loratadine (CLARITIN) 10 mg tablet Take 10 mg by mouth daily as needed.        Past Medical History:   Diagnosis Date    Advance directive discussed with patient 09/13/2016    has info    Endometriosis     GERD (gastroesophageal reflux disease) 07/17/2017    GI eval note Arabella Baptiste post visit to ED for atypical chest pains    Menopause     LMP-Dec. 2004    Murmur     Reflux esophagitis 07/27/2017 initial eval note    7/27/17 Anh Conde note and 8/1/17 EGD report    Tick bite 06/19/2017    Removed with no problems.  [place]ue [place] 4/27/16    [place] [facility]    Vertigo     Vitamin D deficiency     Vocal cord dysfunction     due to significant allergies     Past Surgical History:   Procedure Laterality Date    HX ABDOMINAL LAPAROSCOPY      x 2    HX COLONOSCOPY  2007    HX COLONOSCOPY  7/26/16    10 yr repeat Hi Montiel    HX ENDOSCOPY  08/01/2017    [facility]  path report rec'd    HX HYSTERECTOMY  12/2004    HX OTHER SURGICAL  4/27/16 path pend    lesion on left lateral border of tongue    HX OTHER SURGICAL      basal cell face-2005(in [facility])    HX RETINAL DETACHMENT REPAIR      HX RIGHT SALPINGO-OOPHORECTOMY  1980's    secondary to mass    [place] ABDOMEN SURGERY PROC UNLISTED  1989    cyst removed with right ovary       Review of Systems   Cardiovascular: Positive for palpitations. Gastrointestinal: Positive for heartburn. All other systems reviewed and are negative. Visit Vitals  /70 (BP 1 Location: Right arm, BP Patient Position: Sitting, BP Cuff Size: Adult)   Pulse 78   Resp 16   Ht 5' 10\" (1.778 m)   Wt 180 lb (81.6 kg)   LMP  (LMP Unknown)   SpO2 98%   BMI 25.83 kg/m²       Physical Exam  Vitals and nursing note reviewed. Constitutional:       Appearance: Normal appearance. HENT:      Head: Normocephalic. Right Ear: External ear normal.      Left Ear: External ear normal.      Nose: Nose normal.      Mouth/Throat:      Mouth: Mucous membranes are moist.   Eyes:      Extraocular Movements: Extraocular movements intact. Cardiovascular:      Rate and Rhythm: Normal rate and regular rhythm. Heart sounds: Normal heart sounds. Pulmonary:      Breath sounds: Normal breath sounds. Abdominal:      Palpations: Abdomen is soft. Musculoskeletal:         General: Normal range of motion. Cervical back: Normal range of motion. Skin:     General: Skin is warm and dry. Neurological:      Mental Status: She is alert and oriented to person, place, and time. Psychiatric:         Behavior: Behavior normal.         ASSESSMENT and PLAN  She is in sinus rhythm to exam today. Does seem from listening to her that the symptoms of reflux and esophageal irritation may be triggering some palpitations. I will therefore treat her with Protonix 20 mg a day and she will let us know how things are going. If they are better than I will see her in 6 months. She has seen Dr. Saida Lawrence in the past for possible ablation but is not inclined to do this at this time.

## 2021-12-17 ENCOUNTER — OFFICE VISIT (OUTPATIENT)
Dept: PRIMARY CARE CLINIC | Age: 61
End: 2021-12-17
Payer: COMMERCIAL

## 2021-12-17 VITALS
TEMPERATURE: 97.5 F | WEIGHT: 178 LBS | SYSTOLIC BLOOD PRESSURE: 116 MMHG | HEART RATE: 71 BPM | RESPIRATION RATE: 17 BRPM | HEIGHT: 70 IN | OXYGEN SATURATION: 99 % | DIASTOLIC BLOOD PRESSURE: 77 MMHG | BODY MASS INDEX: 25.48 KG/M2

## 2021-12-17 DIAGNOSIS — Z23 ENCOUNTER FOR IMMUNIZATION: ICD-10-CM

## 2021-12-17 DIAGNOSIS — R10.13 EPIGASTRIC PAIN: ICD-10-CM

## 2021-12-17 DIAGNOSIS — I48.0 PAF (PAROXYSMAL ATRIAL FIBRILLATION) (HCC): Primary | ICD-10-CM

## 2021-12-17 LAB
ALBUMIN SERPL-MCNC: 4.2 G/DL (ref 3.5–5)
ALBUMIN/GLOB SERPL: 1.4 {RATIO} (ref 1.1–2.2)
ALP SERPL-CCNC: 97 U/L (ref 45–117)
ALT SERPL-CCNC: 37 U/L (ref 12–78)
ANION GAP SERPL CALC-SCNC: 0 MMOL/L (ref 5–15)
AST SERPL-CCNC: 16 U/L (ref 15–37)
BILIRUB SERPL-MCNC: 0.3 MG/DL (ref 0.2–1)
BUN SERPL-MCNC: 15 MG/DL (ref 6–20)
BUN/CREAT SERPL: 16 (ref 12–20)
CALCIUM SERPL-MCNC: 9.6 MG/DL (ref 8.5–10.1)
CHLORIDE SERPL-SCNC: 109 MMOL/L (ref 97–108)
CO2 SERPL-SCNC: 30 MMOL/L (ref 21–32)
CREAT SERPL-MCNC: 0.94 MG/DL (ref 0.55–1.02)
GLOBULIN SER CALC-MCNC: 3 G/DL (ref 2–4)
GLUCOSE SERPL-MCNC: 126 MG/DL (ref 65–100)
LIPASE SERPL-CCNC: 152 U/L (ref 73–393)
POTASSIUM SERPL-SCNC: 4.8 MMOL/L (ref 3.5–5.1)
PROT SERPL-MCNC: 7.2 G/DL (ref 6.4–8.2)
SODIUM SERPL-SCNC: 139 MMOL/L (ref 136–145)

## 2021-12-17 PROCEDURE — 90715 TDAP VACCINE 7 YRS/> IM: CPT | Performed by: FAMILY MEDICINE

## 2021-12-17 PROCEDURE — 90471 IMMUNIZATION ADMIN: CPT | Performed by: FAMILY MEDICINE

## 2021-12-17 PROCEDURE — 99204 OFFICE O/P NEW MOD 45 MIN: CPT | Performed by: FAMILY MEDICINE

## 2021-12-17 RX ORDER — OMEPRAZOLE 10 MG/1
10 CAPSULE, DELAYED RELEASE ORAL
COMMUNITY
End: 2022-02-08 | Stop reason: ALTCHOICE

## 2021-12-17 NOTE — PROGRESS NOTES
HPI     Chief Complaint   Patient presents with    New Patient    Establish Care    Cough     prilosec and mylanta for the cough without much success    Other     mammogram    Immunization/Injection     Tdap     She is a 63 y.o. female who presents for establishing care. Multiple concerns today. She has PAF and is followed by Cardiology. States cough is ongoing for multiple years from GERD. States she has rhinorrhea from seasonal allergies. Has been taking Prilosec and Mylanta. She has not seen GI yet. Has follow up scheduled. States she does not feel sick today. Feels GI and Cardiac issues are related. Has done a heart monitor multiple times. Has been to the ER multiple times for episodes where she wakes up with pounding heart, feels short of breath and needs to have a bowel movement. Will have trouble swallowing. Thought the first time she was having a heart attack got checked out and was told everything was fine. States she thought she was having silent reflux so she started taking Pepcid and felt improvement in her symptoms. [facility] has referred her multiple times to her Cardiologist and her heart doctor feels everything is fine. Had enough flare in November. States it takes a while for symptoms to settle back down. She is followed by Dr. Shayy Bahena for Cardiology and is interested in a second opinion. She is followed by Dr. Yarelis Duong for GI. She has not seen him in a while but did call his office. She has started Mylanta and Prilosec recently for her symptoms. States her voice feels raw. She did have an endoscopy with Dr. Demetrius Frias 6-7 years ago who told her she had slight hiatal hernia and otherwise looked okay. States she has tried Cardizem and BB over the years but didn't tolerate it well. She has been seen by ENT Dr. Pierre Sandra but not for this problem. States the lubrication she used to scope her was very irritating to her throat. Wants Tdap today. Desires Shingles. Review of Systems   Constitutional: Negative for chills and fever. HENT: Positive for rhinorrhea. Respiratory: Positive for cough. Reviewed PmHx, RxHx, FmHx, SocHx, AllgHx and updated and dated in the chart. Physical Exam:  Visit Vitals  /77 (BP 1 Location: Right upper arm, BP Patient Position: Sitting, BP Cuff Size: Adult long)   Pulse 71   Temp 97.5 °F (36.4 °C) (Temporal)   Resp 17   Ht 5' 10\" (1.778 m)   Wt 178 lb (80.7 kg)   LMP  (LMP Unknown)   SpO2 99%   BMI 25.54 kg/m²     Physical Exam  Vitals and nursing note reviewed. Constitutional:       General: She is not in acute distress. Appearance: Normal appearance. She is not ill-appearing. Cardiovascular:      Rate and Rhythm: Normal rate and regular rhythm. Heart sounds: No murmur heard. Pulmonary:      Effort: Pulmonary effort is normal. No respiratory distress. Breath sounds: Normal breath sounds. Neurological:      General: No focal deficit present. Mental Status: She is alert. Psychiatric:         Mood and Affect: Mood normal.         Behavior: Behavior normal.       No results found for this or any previous visit (from the past 12 hour(s)). Assessment / Plan     Diagnoses and all orders for this visit:    1. PAF (paroxysmal atrial fibrillation) ([facility] [address]  -     REFERRAL TO CARDIOLOGY    2. Encounter for immunization  -     TETANUS, DIPHTHERIA TOXOIDS AND ACELLULAR PERTUSSIS VACCINE (TDAP), IN INDIVIDS. >=7, IM  -     HI IMMUNIZ ADMIN,1 SINGLE/COMB VAC/TOXOID    3. Epigastric pain  -     LIPASE; Future  -     METABOLIC PANEL, COMPREHENSIVE; Future    Other orders  -     SAMPLES BEING HELD       Given referral to Cards for another opinion. Unclear etiology of her symptoms but recommend follow up with GI for further assessment. Will check a few labs that have not been assessed recently. I have discussed the diagnosis with the patient and the intended plan as seen in the above orders.  The patient has received an after-visit summary and questions were answered concerning future plans. I have discussed medication side effects and warnings with the patient as well.     Abhilash Limon, DO

## 2021-12-17 NOTE — PATIENT INSTRUCTIONS
Tdap (Tetanus, Diphtheria, Pertussis) Vaccine: What You Need to Know  Why get vaccinated? Tdap vaccine can prevent tetanus, diphtheria, and pertussis. Diphtheria and pertussis spread from person to person. Tetanus enters the body through cuts or wounds. · TETANUS (T) causes painful stiffening of the muscles. Tetanus can lead to serious health problems, including being unable to open the mouth, having trouble swallowing and breathing, or death. · DIPHTHERIA (D) can lead to difficulty breathing, heart failure, paralysis, or death. · PERTUSSIS (aP), also known as \"whooping cough,\" can cause uncontrollable, violent coughing which makes it hard to breathe, eat, or drink. Pertussis can be extremely serious in babies and young children, causing pneumonia, convulsions, brain damage, or death. In teens and adults, it can cause weight loss, loss of bladder control, passing out, and rib fractures from severe coughing. Tdap vaccine  Tdap is only for children 7 years and older, adolescents, and adults. Adolescents should receive a single dose of Tdap, preferably at age 6 or 15 years. Pregnant women should get a dose of Tdap during every pregnancy, to protect the  from pertussis. Infants are most at risk for severe, life threatening complications from pertussis. Adults who have never received Tdap should get a dose of Tdap. Also, adults should receive a booster dose every 10 years, or earlier in the case of a severe and dirty wound or burn. Booster doses can be either Tdap or Td (a different vaccine that protects against tetanus and diphtheria but not pertussis). Tdap may be given at the same time as other vaccines. Talk with your health care provider  Tell your vaccine provider if the person getting the vaccine:  · Has had an allergic reaction after a previous dose of any vaccine that protects against tetanus, diphtheria, or pertussis, or has any severe, life threatening allergies.   · Has had a coma, decreased level of consciousness, or prolonged seizures within 7 days after a previous dose of any pertussis vaccine (DTP, DTaP, or Tdap). · Has seizures or another nervous system problem. · Has ever had Guillain-Barré Syndrome (also called GBS). · Has had severe pain or swelling after a previous dose of any vaccine that protects against tetanus or diphtheria. In some cases, your health care provider may decide to postpone Tdap vaccination to a future visit. People with minor illnesses, such as a cold, may be vaccinated. People who are moderately or severely ill should usually wait until they recover before getting Tdap vaccine. Your health care provider can give you more information. Risks of a vaccine reaction  · Pain, redness, or swelling where the shot was given, mild fever, headache, feeling tired, and nausea, vomiting, diarrhea, or stomachache sometimes happen after Tdap vaccine. People sometimes faint after medical procedures, including vaccination. Tell your provider if you feel dizzy or have vision changes or ringing in the ears. As with any medicine, there is a very remote chance of a vaccine causing a severe allergic reaction, other serious injury, or death. What if there is a serious problem? An allergic reaction could occur after the vaccinated person leaves the clinic. If you see signs of a severe allergic reaction (hives, swelling of the face and throat, difficulty breathing, a fast heartbeat, dizziness, or weakness), call 9-1-1 and get the person to the nearest hospital.  For other signs that concern you, call your health care provider. Adverse reactions should be reported to the Vaccine Adverse Event Reporting System (VAERS). Your health care provider will usually file this report, or you can do it yourself. Visit the VAERS website at www.vaers. hhs.gov or call 9-944.754.1677. VAERS is only for reporting reactions, and VAERS staff do not give medical advice.   The National Vaccine Injury Compensation Program  The [place] Injury Compensation Program (VICP) is a federal program that was created to compensate people who may have been injured by certain vaccines. Visit the VICP website at www.[facility].gov/vaccinecompensation or call 6-923.981.8986 to learn about the program and about filing a claim. There is a time limit to file a claim for compensation. How can I learn more? · Ask your health care provider. · Call your local or state health department. · Contact the Centers for Disease Control and Prevention (CDC):  ? Call 8-335.544.3428 (1-800-CDC-INFO) or  ? Visit CDC's website at www.cdc.gov/vaccines  Vaccine Information Statement (Interim)  Tdap (Tetanus, Diphtheria, Pertussis) Vaccine  04/01/2020  [address] [id number]  Department of Health and Human Services  Centers for Disease Control and Prevention  Many Vaccine Information Statements are available in [place] and other languages. See www.immunize.org/vis. Muchas hojas de información sobre vacunas están disponibles en español y en otros idiomas. Visite www.immunize.org/vis. Care instructions adapted under license by [employer] (which disclaims liability or warranty for this information).  If you have questions about a medical condition or this instruction, always ask your healthcare professional. [address] any warranty or liability for your use of this information.

## 2021-12-17 NOTE — PROGRESS NOTES
Identified pt with two pt identifiers(name and ). Chief Complaint   Patient presents with    New Patient    Establish Care    Cough     prilosec and mylanta for the cough without much success    Other     mammogram    Immunization/Injection     Tdap        3 most recent PHQ Screens 2021   Little interest or pleasure in doing things Not at all   Feeling down, depressed, irritable, or hopeless Not at all   Total Score PHQ 2 0        Vitals:    21 0951   BP: 116/77   Pulse: 71   Resp: 17   Temp: 97.5 °F (36.4 °C)   TempSrc: Temporal   SpO2: 99%   Weight: 178 lb (80.7 kg)   Height: 5' 10\" (1.778 m)   PainSc:   0 - No pain       Health Maintenance Due   Topic    Shingrix Vaccine Age 50> (1 of 2)    Flu Vaccine (1)    COVID-19 Vaccine (3 - Booster for [employer] series)       1. Have you been to the ER, urgent care clinic since your last visit? Hospitalized since your last visit? No    2. Have you seen or consulted any other health care providers outside of the [address] [place] [address] since your last visit? Include any pap smears or colon screening.  Dr Annie Johnson; has not seen him in a while

## 2021-12-24 ENCOUNTER — PATIENT MESSAGE (OUTPATIENT)
Dept: CARDIOLOGY CLINIC | Age: 61
End: 2021-12-24

## 2021-12-24 NOTE — TELEPHONE ENCOUNTER
Pt called with sx of BM x 3, felt off and pain in \"solar plexus\" that has resolved  Says her AF is often with URI symptoms and now feels some GERD, seeing GI on Monday  No fever, has been vaccinated     Kardia reading sent in, shows IRIR and hard to read but probably Af at 111  Now Sloane Anger shows NSR per pt, she feels mild symptoms now  No cp  Advised to hydrate, rest, watch for viral symptoms, go to ER or urgent care if worse,but seems stable for now  If AF comes back , can take one extra propafenone  Fu with Dr Anam Casey as he directs    Future Appointments   Date Time Provider Albin Amaral   5/24/2022  8:40 AM MD HARSH Martin AMB

## 2021-12-27 ENCOUNTER — TRANSCRIBE ORDER (OUTPATIENT)
Dept: SCHEDULING | Age: 61
End: 2021-12-27

## 2021-12-27 DIAGNOSIS — Z12.31 ENCOUNTER FOR MAMMOGRAM TO ESTABLISH BASELINE MAMMOGRAM: Primary | ICD-10-CM

## 2021-12-28 ENCOUNTER — TELEPHONE (OUTPATIENT)
Dept: CARDIOLOGY CLINIC | Age: 61
End: 2021-12-28

## 2021-12-28 DIAGNOSIS — R73.09 ELEVATED GLUCOSE LEVEL: Primary | ICD-10-CM

## 2021-12-28 NOTE — TELEPHONE ENCOUNTER
Enrolled with [employer] - Ordered and being shipped to patient's home address on file. EXPEDITED ETA within 1-2 business days.

## 2021-12-28 NOTE — TELEPHONE ENCOUNTER
Patient stated she has been having tachycardia episodes since 12/24/21, sweaty hands and feet and a dull ache at the base of her breast bone, beatriz, patient requesting to be seen by Dr. Jah Uribe,       Transferred call to the nurse

## 2021-12-28 NOTE — TELEPHONE ENCOUNTER
Identifiers x 2. Patient states ongoing tachycardia episodes via [employer] that seem to become more frequent. Readings sent via [employer]. She spoke with radha BLAS on 12-24 and was told to take extra dose of propafenone. Otherwise, she has been taking 150 mg bid. She feels shaky, sweaty, hot/cold with discomfort at breastbone. Has contacted GI MD for possible scope since she thinks it may be related to GI reflux. Takes prilosec. Unable to schedule endoscopy until February. Feels like \"heart is working hard\". Recent /68; 79. Scheduled for covid booster today at 11 am.  Wants to know if she should proceed.   To discuss with MD.

## 2021-12-28 NOTE — TELEPHONE ENCOUNTER
Identifiers x 2. Informed of need for external monitor. Per Candida Tirado, to place in clinic today. Inquired to taking aspirin. Per Dr. Jaquan Smith, does not need to start aspirin. Future Appointments   Date Time Provider Albin Amaral   12/28/2021  2:00 PM HOLTER, REYNOLDS CAVREY BS AMB   2/4/2022  8:00 AM [facility]   2/8/2022  8:20 AM MD HARSH Olsen BS AMB   5/24/2022  8:40 AM MD HARSH Tapia BS AMB

## 2022-01-27 DIAGNOSIS — I48.0 PAROXYSMAL ATRIAL FIBRILLATION (HCC): ICD-10-CM

## 2022-01-31 RX ORDER — PROPAFENONE HYDROCHLORIDE 150 MG/1
TABLET, FILM COATED ORAL
Qty: 180 TABLET | Refills: 0 | Status: SHIPPED | OUTPATIENT
Start: 2022-01-31 | End: 2022-05-02

## 2022-01-31 NOTE — TELEPHONE ENCOUNTER
Requested Prescriptions     Signed Prescriptions Disp Refills    propafenone (RYTHMOL) 150 mg tablet 180 Tablet 0     Sig: TAKE 1 TABLET BY MOUTH TWICE A DAY     Authorizing Provider: Jarred Bueno     Ordering User: Vern Weeks    Per Dr. Yassine Perez verbal order

## 2022-02-08 ENCOUNTER — OFFICE VISIT (OUTPATIENT)
Dept: CARDIOLOGY CLINIC | Age: 62
End: 2022-02-08
Payer: COMMERCIAL

## 2022-02-08 VITALS
HEART RATE: 69 BPM | RESPIRATION RATE: 14 BRPM | BODY MASS INDEX: 25.48 KG/M2 | SYSTOLIC BLOOD PRESSURE: 118 MMHG | DIASTOLIC BLOOD PRESSURE: 76 MMHG | WEIGHT: 178 LBS | HEIGHT: 70 IN | OXYGEN SATURATION: 99 %

## 2022-02-08 DIAGNOSIS — I49.3 PVC (PREMATURE VENTRICULAR CONTRACTION): ICD-10-CM

## 2022-02-08 DIAGNOSIS — R00.2 PALPITATIONS: ICD-10-CM

## 2022-02-08 DIAGNOSIS — I49.1 PAC (PREMATURE ATRIAL CONTRACTION): ICD-10-CM

## 2022-02-08 DIAGNOSIS — I49.9 IRREGULAR HEART BEATS: Primary | ICD-10-CM

## 2022-02-08 DIAGNOSIS — K21.9 GASTROESOPHAGEAL REFLUX DISEASE WITHOUT ESOPHAGITIS: ICD-10-CM

## 2022-02-08 PROCEDURE — 99214 OFFICE O/P EST MOD 30 MIN: CPT | Performed by: INTERNAL MEDICINE

## 2022-02-08 RX ORDER — FAMOTIDINE 20 MG/1
20 TABLET, FILM COATED ORAL AS NEEDED
COMMUNITY
End: 2022-09-06

## 2022-02-08 NOTE — PROGRESS NOTES
Cardiac Electrophysiology OFFICE Note     Subjective:      Breana Cruz is a 63 y.o. patient who is seen for follow up of atrial fibrillation. Propafenone 150 mg po bid was ordered on 11/19/2019  She said she is having palpitations terminated with this dose   She said she has PAF  She has had holter and 2 event recorders that showed PAC and PVC  She thinks palpitations caused by GERD       [id number] 1 (female), no OAC. She had used cardizem and toprol before      Previous:      Holter (03/29/2019): HR  bpm, average 75 bpm.  Sinus dang to sinus tach. Rare PACs, rare PVCs. Echo (02/13/2019): LVEF 61-65%, no RWMA. Trace MR. Mild AR. Mild TR. Stress echo (06/16/2017): Normal.    Also treated for PACs & PVCs previously. Nuclear stress (05/03/2013): LVEF 68%, no ischemia. Significant GERD, known hiatal hernia. Sees Dr. Kennedy Fleischer (GI). Primary cardiologist is Dr. Judit Cruz. 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  propafenone (RYTHMOL) 150 mg tablet TAKE 1 TABLET BY MOUTH TWICE A  Tablet 0    loratadine (CLARITIN) 10 mg tablet Take 10 mg by mouth daily as needed.  clindamycin phosphate 1 % swab as needed.  (Patient not taking: Reported on 2/8/2022)       Allergies   Allergen Reactions    Morphine Rash     Leg rash    Penicillins Hives     Past Medical History:   Diagnosis Date    Advance directive discussed with patient 09/13/2016    has info    Endometriosis     GERD (gastroesophageal reflux disease) 07/17/2017    GI eval note Sil Calderon post visit to ED for atypical chest pains    Menopause     LMP-Dec. 2004    Murmur     Reflux esophagitis 07/27/2017 initial eval note    7/27/17 Arleth Cody note and 8/1/17 EGD report    Tick bite 06/19/2017    Removed with no problems.  Tongue lesion 4/27/16    area removed per note from [facility]    Vertigo     Vitamin D deficiency     Vocal cord dysfunction     due to significant allergies     Past Surgical History:   Procedure Laterality Date    HX ABDOMINAL LAPAROSCOPY      x 2    HX COLONOSCOPY  2007    HX COLONOSCOPY  7/26/16    10 yr repeat Cresenciano Victorias    HX ENDOSCOPY  08/01/2017    Cresenciano Bitters  path report rec'd    HX HYSTERECTOMY  12/2004    HX OTHER SURGICAL  4/27/16 path pend    lesion on left lateral border of tongue    HX OTHER SURGICAL      basal cell face-2005(in [facility])    HX RETINAL DETACHMENT REPAIR      HX RIGHT SALPINGO-OOPHORECTOMY  1980's    secondary to mass    [place] ABDOMEN SURGERY PROC UNLISTED  1989    cyst removed with right ovary     History reviewed. No arrhythmia or sudden death in family history. Social History     Tobacco Use    Smoking status: Never Smoker    Smokeless tobacco: Never Used   Substance Use Topics    Alcohol use: No        Review of Systems:   Constitutional: Negative for fever, chills, weight loss   HEENT: Negative for nosebleeds, vision changes. Respiratory: Negative for cough, hemoptysis  Cardiovascular: Negative for chest pain, + palpitations, no orthopnea, claudication, leg swelling, syncope, and PND. Gastrointestinal: Negative for nausea, vomiting, diarrhea, blood in stool and melena. +GERD, epigastric tenderness. Genitourinary: Negative for dysuria, and hematuria. Musculoskeletal: Negative for myalgias, arthralgia. Skin: Negative for rash. Heme: Does not bleed or bruise easily.    Neurological: Negative for speech change and focal weakness. Objective:     Visit Vitals  /76 (BP 1 Location: Left arm, BP Patient Position: Sitting, BP Cuff Size: Adult)   Pulse 69   Resp 14   Ht 5' 10\" (1.778 m)   Wt 178 lb (80.7 kg)   LMP  (LMP Unknown)   SpO2 99%   BMI 25.54 kg/m²      Physical Exam:   Constitutional: well-developed and well-nourished. No respiratory distress. Head: Normocephalic and atraumatic. Eyes: Pupils are equal, round. ENT: Hearing grossly normal.  Neck: Supple. No JVD present. Cardiovascular: Normal rate, regular rhythm. Exam reveals no gallop and no friction rub. No murmur heard. Pulmonary/Chest: Effort normal and breath sounds normal. No wheezes. Abdominal: Soft, no tenderness. Musculoskeletal: No edema. Neurological: Alert, oriented. Skin: Skin is warm and dry  Psychiatric: Appears mildly anxious, otherwise normal mood and affect. Behavior is normal. Judgment and thought content normal.         Assessment/Plan:       ICD-10-CM ICD-9-CM    1. Irregular heart beats  I49.9 427.9    2. PAC (premature atrial contraction)  I49.1 427.61    3. Palpitations  R00.2 785.1    4. Gastroesophageal reflux disease without esophagitis  K21.9 530.81    5. PVC (premature ventricular contraction)  I49.3 427.69      Ms. Refugchava Thrasher has had recent increase in palpitation which she thinks PAF  However many recorders have shown PACs and PVCs  She thinks propafenone still works    Previously used Toprol XL as pill in the pocket, recently started taking 25 mg po daily. Dosing limited by BP. Discussed rationale for propafenone     If medications control arrhythmia well & she tolerates without difficulty, will continue. She will consider AF PVC ablations if meds do not work. [id number] 1 (female), no OAC. Future Appointments   Date Time Provider Albin Amaral   2/12/2022  8:15 AM [facility]  [facility]   5/24/2022  8:40 AM MD HARSH Chauhan AMB     Thank you for involving me in this patient's care and please call with further concerns or questions. Cullen Quach M.D.   Electrophysiology/Cardiology  [facility]  [address], [address] , [place] [address], [address] [place] [address]  [phone]

## 2022-02-12 ENCOUNTER — HOSPITAL ENCOUNTER (OUTPATIENT)
Dept: MAMMOGRAPHY | Age: 62
Discharge: HOME | End: 2022-02-12
Attending: FAMILY MEDICINE
Payer: COMMERCIAL

## 2022-02-12 DIAGNOSIS — Z12.31 ENCOUNTER FOR MAMMOGRAM TO ESTABLISH BASELINE MAMMOGRAM: ICD-10-CM

## 2022-02-12 PROCEDURE — 77063 BREAST TOMOSYNTHESIS BI: CPT

## 2022-02-14 ENCOUNTER — DOCUMENTATION ONLY (OUTPATIENT)
Dept: CARDIOLOGY CLINIC | Age: 62
End: 2022-02-14

## 2022-02-14 NOTE — PROGRESS NOTES
Patient said she has PAF on iwatch and will send copy  She had for 30 minutes and took propafenone PRN and it works

## 2022-02-16 ENCOUNTER — TRANSCRIBE ORDER (OUTPATIENT)
Dept: SCHEDULING | Age: 62
End: 2022-02-16

## 2022-02-16 DIAGNOSIS — R92.8 MAMMOGRAM ABNORMAL: Primary | ICD-10-CM

## 2022-02-24 ENCOUNTER — HOSPITAL ENCOUNTER (OUTPATIENT)
Dept: MAMMOGRAPHY | Age: 62
Discharge: HOME | End: 2022-02-24
Attending: FAMILY MEDICINE
Payer: COMMERCIAL

## 2022-02-24 DIAGNOSIS — R92.8 ABNORMAL MAMMOGRAM OF LEFT BREAST: ICD-10-CM

## 2022-02-24 DIAGNOSIS — R92.8 MAMMOGRAM ABNORMAL: ICD-10-CM

## 2022-02-24 PROCEDURE — 76642 ULTRASOUND BREAST LIMITED: CPT

## 2022-02-24 PROCEDURE — 77065 DX MAMMO INCL CAD UNI: CPT

## 2022-02-24 PROCEDURE — 76641 ULTRASOUND BREAST COMPLETE: CPT

## 2022-02-24 NOTE — PROGRESS NOTES
It seems that found several small cysts in your breast. Do you have any symptoms related to this? Often times this is benign and nothing to worry about. Please follow up with us about this. Thank you.

## 2022-02-25 ENCOUNTER — TELEPHONE (OUTPATIENT)
Dept: PRIMARY CARE CLINIC | Age: 62
End: 2022-02-25

## 2022-02-25 NOTE — TELEPHONE ENCOUNTER
----- Message from Lisbeth Adamson NP sent at 2/24/2022  3:12 PM EST -----  It seems that found several small cysts in your breast. Do you have any symptoms related to this? Often times this is benign and nothing to worry about. Please follow up with us about this. Thank you.

## 2022-03-18 PROBLEM — R10.13 EPIGASTRIC PAIN: Status: ACTIVE | Noted: 2019-12-17

## 2022-03-19 PROBLEM — K21.9 GASTROESOPHAGEAL REFLUX DISEASE: Status: ACTIVE | Noted: 2017-06-21

## 2022-03-19 PROBLEM — D31.40: Status: ACTIVE | Noted: 2017-04-25

## 2022-03-20 PROBLEM — I48.0 PAROXYSMAL ATRIAL FIBRILLATION (HCC): Status: ACTIVE | Noted: 2019-12-17

## 2022-03-23 ENCOUNTER — HOSPITAL ENCOUNTER (OUTPATIENT)
Dept: GENERAL RADIOLOGY | Age: 62
Discharge: HOME | End: 2022-03-23
Attending: CHIROPRACTOR
Payer: COMMERCIAL

## 2022-03-23 ENCOUNTER — TRANSCRIBE ORDER (OUTPATIENT)
Dept: MRI IMAGING | Age: 62
End: 2022-03-23

## 2022-03-23 DIAGNOSIS — M99.07 SOMATIC DYSFUNCTION OF UPPER EXTREMITIES: ICD-10-CM

## 2022-03-23 DIAGNOSIS — M99.01 CERVICAL SEGMENT DYSFUNCTION: ICD-10-CM

## 2022-03-23 DIAGNOSIS — M99.01 CERVICAL SEGMENT DYSFUNCTION: Primary | ICD-10-CM

## 2022-03-23 PROCEDURE — 72050 X-RAY EXAM NECK SPINE 4/5VWS: CPT

## 2022-03-23 PROCEDURE — 73030 X-RAY EXAM OF SHOULDER: CPT

## 2022-04-14 ENCOUNTER — TELEPHONE (OUTPATIENT)
Dept: PRIMARY CARE CLINIC | Age: 62
End: 2022-04-14

## 2022-04-14 NOTE — TELEPHONE ENCOUNTER
----- Message from Mekhi Green sent at 4/14/2022 10:06 AM EDT -----  Subject: Appointment Request    Reason for Call: Urgent (Patient Request) No Script    QUESTIONS  Type of Appointment? Established Patient  Reason for appointment request? Available appointments did not meet   patient need  Additional Information for Provider? Pt is requesting an appt for muscle   fatigue. This has been going for a couple of weeks. She would like to be   seen as soon as possible   ---------------------------------------------------------------------------  --------------  CALL BACK INFO  What is the best way for the office to contact you? OK to leave message on   voicemail  Preferred Call Back Phone Number? [phone]  ---------------------------------------------------------------------------  --------------  SCRIPT ANSWERS  Relationship to Patient? Self  (Is the patient requesting to see the provider for a procedure?)? No  (Is the patient requesting to see the provider urgently  today or   tomorrow. )? Yes  Have you been diagnosed with, awaiting test results for, or told that you   are suspected of having COVID-19 (Coronavirus)? (If patient has tested   negative or was tested as a requirement for work, school, or travel and   not based on symptoms, answer no)? No  Within the past 10 days have you developed any of the following symptoms   (answer no if symptoms have been present longer than 10 days or began   more than 10 days ago)? Fever or Chills, Cough, Shortness of breath or   difficulty breathing, Loss of taste or smell, Sore throat, Nasal   congestion, Sneezing or runny nose, Fatigue or generalized body aches   (answer no if pain is specific to a body part e.g. back pain), Diarrhea,   Headache? No  Have you had close contact with someone with COVID-19 in the last 7 days? No  (Service Expert  click yes below to proceed with [employer] As Usual   Scheduling)?  Yes

## 2022-04-28 ENCOUNTER — TELEPHONE (OUTPATIENT)
Dept: CARDIOLOGY CLINIC | Age: 62
End: 2022-04-28

## 2022-04-28 DIAGNOSIS — I48.0 PAROXYSMAL ATRIAL FIBRILLATION (HCC): ICD-10-CM

## 2022-05-02 RX ORDER — PROPAFENONE HYDROCHLORIDE 150 MG/1
TABLET, FILM COATED ORAL
Qty: 180 TABLET | Refills: 0 | Status: SHIPPED | OUTPATIENT
Start: 2022-05-02 | End: 2022-05-19 | Stop reason: SDUPTHER

## 2022-05-02 NOTE — TELEPHONE ENCOUNTER
Requested Prescriptions     Signed Prescriptions Disp Refills    propafenone (RYTHMOL) 150 mg tablet 180 Tablet 0     Sig: TAKE 1 TABLET BY MOUTH TWICE A DAY     Authorizing Provider: Pineda Hendersonr     Ordering User: Yudy Curtis    Per Dr. Shy Jett verbal order

## 2022-05-12 ENCOUNTER — NURSE TRIAGE (OUTPATIENT)
Dept: OTHER | Facility: CLINIC | Age: 62
End: 2022-05-12

## 2022-05-19 ENCOUNTER — OFFICE VISIT (OUTPATIENT)
Dept: CARDIOLOGY CLINIC | Age: 62
End: 2022-05-19
Payer: COMMERCIAL

## 2022-05-19 VITALS
WEIGHT: 179.2 LBS | BODY MASS INDEX: 25.65 KG/M2 | DIASTOLIC BLOOD PRESSURE: 70 MMHG | OXYGEN SATURATION: 99 % | HEART RATE: 66 BPM | RESPIRATION RATE: 14 BRPM | SYSTOLIC BLOOD PRESSURE: 110 MMHG | HEIGHT: 70 IN

## 2022-05-19 DIAGNOSIS — R00.0 TACHYCARDIA: ICD-10-CM

## 2022-05-19 DIAGNOSIS — T50.905D DRUG SIDE EFFECTS, SUBSEQUENT ENCOUNTER: ICD-10-CM

## 2022-05-19 DIAGNOSIS — Z88.9 MULTIPLE ALLERGIES: ICD-10-CM

## 2022-05-19 DIAGNOSIS — I48.0 PAROXYSMAL ATRIAL FIBRILLATION (HCC): ICD-10-CM

## 2022-05-19 DIAGNOSIS — I49.3 PVC (PREMATURE VENTRICULAR CONTRACTION): ICD-10-CM

## 2022-05-19 DIAGNOSIS — R00.2 PALPITATIONS: Primary | ICD-10-CM

## 2022-05-19 DIAGNOSIS — K21.9 GASTROESOPHAGEAL REFLUX DISEASE WITHOUT ESOPHAGITIS: ICD-10-CM

## 2022-05-19 DIAGNOSIS — I49.9 IRREGULAR HEART BEATS: ICD-10-CM

## 2022-05-19 DIAGNOSIS — I49.1 PAC (PREMATURE ATRIAL CONTRACTION): ICD-10-CM

## 2022-05-19 PROCEDURE — 93000 ELECTROCARDIOGRAM COMPLETE: CPT | Performed by: SPECIALIST

## 2022-05-19 PROCEDURE — 99214 OFFICE O/P EST MOD 30 MIN: CPT | Performed by: SPECIALIST

## 2022-05-19 RX ORDER — PROPAFENONE HYDROCHLORIDE 150 MG/1
150 TABLET, FILM COATED ORAL 2 TIMES DAILY
Qty: 180 TABLET | Refills: 3 | Status: SHIPPED | OUTPATIENT
Start: 2022-05-19

## 2022-05-19 NOTE — PROGRESS NOTES
HISTORY OF PRESENT ILLNESS  Breana Alonzo is a 63 y.o. female. She has a remote history of atrial fibrillation and palpitations since. Monitors have only shown ectopic beats and no atrial fibrillation. She has intolerance to multiple medications. She has stopped taking Claritin and Pepcid and her symptoms seem much better. Propafenone twice daily has controlled her palpitations the best.  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 180 Tablet 3    famotidine (Pepcid) 20 mg tablet Take 20 mg by mouth as needed for Heartburn (takes every 2-4 days). (Patient not taking: Reported on 5/19/2022)      loratadine (CLARITIN) 10 mg tablet Take 10 mg by mouth daily as needed. (Patient not taking: Reported on 5/19/2022)       Past Medical History:   Diagnosis Date    Advance directive discussed with patient 09/13/2016    has info    Endometriosis     GERD (gastroesophageal reflux disease) 07/17/2017    GI eval note Petra Bronson post visit to ED for atypical chest pains    Menopause     LMP-Dec. 2004    Murmur     Reflux esophagitis 07/27/2017 initial eval note    7/27/17 Geovanna Byrd note and 8/1/17 EGD report    Tick bite 06/19/2017    Removed with no problems.     Tongue lesion 4/27/16    area removed [facility] note from [facility]    Vertigo     Vitamin D deficiency     Vocal cord dysfunction     due to significant allergies     Past Surgical History:   Procedure Laterality Date    HX ABDOMINAL LAPAROSCOPY      x 2    HX COLONOSCOPY  2007    HX COLONOSCOPY  7/26/16    10 yr repeat Yesenia Louise    HX ENDOSCOPY  08/01/2017    Yeseniasamy Louise  path report rec'd    HX HYSTERECTOMY  12/2004    HX OTHER SURGICAL  4/27/16 path pend    lesion on left lateral border of tongue    HX OTHER SURGICAL      basal cell face-2005(in [facility])    HX RETINAL DETACHMENT REPAIR      HX RIGHT SALPINGO-OOPHORECTOMY  1980's    secondary to mass    [place] ABDOMEN SURGERY PROC UNLISTED  1989    cyst removed with right ovary       Review of Systems   Cardiovascular: Positive for palpitations. All other systems reviewed and are negative. Visit Vitals  /70 (BP 1 Location: Left arm, BP Patient Position: Sitting, BP Cuff Size: Adult)   Pulse 66   Resp 14   Ht 5' 10\" (1.778 m)   Wt 179 lb 3.2 oz (81.3 kg)   LMP  (LMP Unknown)   SpO2 99%   BMI 25.71 kg/m²       Physical Exam  Vitals and nursing note reviewed. Constitutional:       Appearance: Normal appearance. HENT:      Head: Normocephalic. Right Ear: External ear normal.      Left Ear: External ear normal.      Nose: Nose normal.      Mouth/Throat:      Mouth: Mucous membranes are moist.   Eyes:      Extraocular Movements: Extraocular movements intact. Cardiovascular:      Rate and Rhythm: Normal rate and regular rhythm. Heart sounds: Normal heart sounds. Pulmonary:      Breath sounds: Normal breath sounds. Abdominal:      Palpations: Abdomen is soft. Musculoskeletal:         General: Normal range of motion. Cervical back: Normal range of motion. Skin:     General: Skin is warm. Neurological:      Mental Status: She is alert and oriented to person, place, and time. Psychiatric:         Behavior: Behavior normal.         ASSESSMENT and PLAN  By stopping all medications except propafenone her symptoms are much improved. I will continue this regimen and see her in 1 year.

## 2022-09-06 ENCOUNTER — OFFICE VISIT (OUTPATIENT)
Dept: PRIMARY CARE CLINIC | Age: 62
End: 2022-09-06
Payer: COMMERCIAL

## 2022-09-06 VITALS
HEIGHT: 70 IN | OXYGEN SATURATION: 95 % | BODY MASS INDEX: 25.77 KG/M2 | SYSTOLIC BLOOD PRESSURE: 108 MMHG | RESPIRATION RATE: 18 BRPM | DIASTOLIC BLOOD PRESSURE: 71 MMHG | TEMPERATURE: 98.9 F | HEART RATE: 79 BPM | WEIGHT: 180 LBS

## 2022-09-06 DIAGNOSIS — R53.82 CHRONIC FATIGUE: ICD-10-CM

## 2022-09-06 DIAGNOSIS — G47.00 INSOMNIA, UNSPECIFIED TYPE: Primary | ICD-10-CM

## 2022-09-06 DIAGNOSIS — Z13.6 ENCOUNTER FOR LIPID SCREENING FOR CARDIOVASCULAR DISEASE: ICD-10-CM

## 2022-09-06 DIAGNOSIS — R00.2 PALPITATIONS: ICD-10-CM

## 2022-09-06 DIAGNOSIS — Z13.220 ENCOUNTER FOR LIPID SCREENING FOR CARDIOVASCULAR DISEASE: ICD-10-CM

## 2022-09-06 DIAGNOSIS — R73.03 PREDIABETES: ICD-10-CM

## 2022-09-06 DIAGNOSIS — R19.7 DIARRHEA, UNSPECIFIED TYPE: ICD-10-CM

## 2022-09-06 PROCEDURE — 99215 OFFICE O/P EST HI 40 MIN: CPT | Performed by: FAMILY MEDICINE

## 2022-09-06 RX ORDER — HYDROXYZINE HYDROCHLORIDE 10 MG/1
10 TABLET, FILM COATED ORAL
Qty: 30 TABLET | Refills: 2 | Status: SHIPPED | OUTPATIENT
Start: 2022-09-06 | End: 2022-10-03 | Stop reason: SDUPTHER

## 2022-09-06 NOTE — PROGRESS NOTES
Chief Complaint   Patient presents with    Insomnia    Other     Ongoing abdominal issues for a few years  Intermittent idiopathic heart palpitations      1. \"Have you been to the ER, urgent care clinic since your last visit? Hospitalized since your last visit? \" No    2. \"Have you seen or consulted any other health care providers outside of the [address] [place] [address] since your last visit? \" No     3. For patients aged 39-70: Has the patient had a colonoscopy / FIT/ Cologuard? Yes - no Care Gap present      If the patient is female:    4. For patients aged 41-77: Has the patient had a mammogram within the past 2 years? Yes - no Care Gap present      5. For patients aged 21-65: Has the patient had a pap smear?  NA - based on age or sex

## 2022-09-06 NOTE — PROGRESS NOTES
HPI     Chief Complaint   Patient presents with    Insomnia    Other     Ongoing abdominal issues for a few years  Intermittent idiopathic heart palpitations      She is a 63 y.o. female who presents for insomnia. Insomnia - Ongoing for years. Seems like it is slowly getting worse. States she does not think it is severe but had a bad \"spout\" a few weeks ago. Typically goes to bed at 9 AM and wakes up around 12. Will be up for a few hours and then falls back to sleep again for a few hours. States she gets about 3-4 hours of consecutive sleep. Feels she is sensitive to drugs so she has not tried anything over the counter. Had a kidney stone in last year and had Tylenol with Codeine left over and took a half tablet and she slept through the night. States she does not want to get into this habit. Has tried Melatonin but feels it gives her heart palpitations. She does acknowledge some anxiety at bedtime. GI/ Heart Palpitations - States she had a recent bout 2 weeks ago. States she woke up in the middle of the night and felt her heart was pounding and she was breathless. Felt a \"pinch nerve\" sensation that radiated outward. Could not get comfortable laying on her side or laying on her back. Felt she started to get anxious. States she ended up sitting up the rest of the night. The next morning she called her sister who recommended she try Gaviscon which helped settle things down. States she props her headboard up at baseline about 8 inches. States she has a chronic cough that she contributes to allergies. She is not sure if she is having stomach issues. Several years she had an endoscopy with Dr. Liliya Jay who felt she had a slight hiatal hernia. Feels this is what is causing her phelgm like cough. States Claritin does not touch her cough and she can't take any other medication. She has an appt 9/16 with her ENT Dr. Donnajean Angelucci.  States her sister also has a chronic cough which she takes something for but not sure what it is. She is avoiding for spicy foods. She has an appt 10/1 with a nutrionist. She saw Dr. Damon Beltre 9 years ago for allergies and she did a round of shots for 1 year which she feels made no difference. She has not tried Singulair here. Tried it in [place] several years ago and thinks she tolerated it okay. Tried Flonase in the last 3 weeks along with her Claritin. Review of Systems   Cardiovascular:  Positive for palpitations. Gastrointestinal:  Positive for diarrhea. Psychiatric/Behavioral:  Positive for sleep disturbance. Negative for dysphoric mood and suicidal ideas. The patient is nervous/anxious. Reviewed PmHx, RxHx, FmHx, SocHx, AllgHx and updated and dated in the chart. Physical Exam:  Visit Vitals  /71 (BP 1 Location: Left upper arm, BP Patient Position: Sitting)   Pulse 79   Temp 98.9 °F (37.2 °C) (Oral)   Resp 18   Ht 5' 10\" (1.778 m)   Wt 180 lb (81.6 kg)   LMP  (LMP Unknown)   SpO2 95%   BMI 25.83 kg/m²     Physical Exam  Vitals and nursing note reviewed. Constitutional:       General: She is not in acute distress. Appearance: Normal appearance. She is not ill-appearing. Cardiovascular:      Rate and Rhythm: Normal rate and regular rhythm. Heart sounds: No murmur heard. Pulmonary:      Effort: Pulmonary effort is normal. No respiratory distress. Breath sounds: Normal breath sounds. Neurological:      General: No focal deficit present. Mental Status: She is alert. Psychiatric:         Mood and Affect: Mood normal.         Behavior: Behavior normal.     No results found for this or any previous visit (from the past 12 hour(s)). Assessment / Plan     Diagnoses and all orders for this visit:    1. Insomnia, unspecified type  -     hydrOXYzine HCL (ATARAX) 10 mg tablet; Take 1 Tablet by mouth nightly as needed for Sleep. 2. Prediabetes  -     HEMOGLOBIN A1C WITH EAG; Future    3.  Encounter for lipid screening for cardiovascular disease  -     LIPID PANEL; Future    4. Chronic fatigue  -     METABOLIC PANEL, COMPREHENSIVE; Future  -     CBC W/O DIFF; Future  -     TSH 3RD GENERATION; Future  -     VITAMIN D, 25 HYDROXY; Future  -     VITAMIN B12; Future  -     IRON PROFILE; Future  -     FERRITIN; Future    5. Diarrhea, unspecified type  -     CELIAC ANTIBODY PROFILE; Future    6. Palpitations     Patient has seen multiple providers for these problems. Recommend she try antihistamine medication to see if this helps with her night time cough and insomnia. If this does not help would recommend she see Allergy again. Could also trial Singulair. She denies any current chest pain or palpations. Declines EKG at this time. She will reach out to Dr. Faiza Doll to ensure he has no issues with her taking Atarax while on her Rythmol as she is concerned about possible interaction. I have discussed the diagnosis with the patient and the intended plan as seen in the above orders. The patient has received an after-visit summary and questions were answered concerning future plans. I have discussed medication side effects and warnings with the patient as well. I spent a total of 40 minutes in both face to face and in non face to face activities for the visit on the date of this encounter.     Dang Zaragoza, DO

## 2022-09-08 ENCOUNTER — TELEPHONE (OUTPATIENT)
Dept: CARDIOLOGY CLINIC | Age: 62
End: 2022-09-08

## 2022-09-08 NOTE — TELEPHONE ENCOUNTER
Called pt. Verified patient's identity with two identifiers. Notified her of Dr. Vanessa Dave message. Patient verbalized understanding and denied further questions or concerns.

## 2022-09-08 NOTE — TELEPHONE ENCOUNTER
Pt stated she her doctor would like to prescribe hydroxyzine 10mg for insomnia, the medications has side effects, it may effect the rhythm of the heart, the pt would like to know if the medication would be ok for her to take, please advise      PT # [phone]

## 2022-09-09 ENCOUNTER — TELEPHONE (OUTPATIENT)
Dept: PRIMARY CARE CLINIC | Age: 62
End: 2022-09-09

## 2022-09-09 NOTE — TELEPHONE ENCOUNTER
Called and discussed lab results. Will follow up in office for additional concerns she did not discuss at last visit.

## 2022-09-09 NOTE — TELEPHONE ENCOUNTER
----- Message from Marin Enciso sent at 9/9/2022 11:21 AM EDT -----  Regarding: FW: Blood test results questions    ----- Message -----  From: Dahlia Ellis  Sent: 9/9/2022   9:37 AM EDT  To: [facility]  Subject: Blood test results questions                     Perhaps you will address this in a later message but I reviewed all the bloodwork results and have some questions. I see my Vit D is just barely in normal range (30.3 in ). Should I increase my Vit D intake? Anion gap was only 1 in 5-15 range. Some symptoms are shortness of breath, abnormal heartbeat, and weakness. I have all these. Is this something that needs addressed? Also noticed BUN/Creatin ration was slightly elevated 21 in 12-20 range). Is this something that needs addressed? I would like your thoughts on all these results. Thanks.

## 2022-09-13 ENCOUNTER — TRANSCRIBE ORDER (OUTPATIENT)
Dept: SCHEDULING | Age: 62
End: 2022-09-13

## 2022-09-13 DIAGNOSIS — R10.13 EPIGASTRIC DISCOMFORT: Primary | ICD-10-CM

## 2022-09-23 ENCOUNTER — HOSPITAL ENCOUNTER (OUTPATIENT)
Dept: GENERAL RADIOLOGY | Age: 62
Discharge: HOME | End: 2022-09-23
Attending: INTERNAL MEDICINE
Payer: COMMERCIAL

## 2022-09-23 DIAGNOSIS — R10.13 EPIGASTRIC DISCOMFORT: ICD-10-CM

## 2022-09-23 PROCEDURE — 74246 X-RAY XM UPR GI TRC 2CNTRST: CPT

## 2022-09-26 ENCOUNTER — OFFICE VISIT (OUTPATIENT)
Dept: CARDIOLOGY CLINIC | Age: 62
End: 2022-09-26
Payer: COMMERCIAL

## 2022-09-26 VITALS
HEART RATE: 75 BPM | OXYGEN SATURATION: 98 % | BODY MASS INDEX: 25.28 KG/M2 | HEIGHT: 70 IN | RESPIRATION RATE: 16 BRPM | DIASTOLIC BLOOD PRESSURE: 78 MMHG | SYSTOLIC BLOOD PRESSURE: 114 MMHG | WEIGHT: 176.6 LBS

## 2022-09-26 DIAGNOSIS — R00.2 PALPITATIONS: ICD-10-CM

## 2022-09-26 DIAGNOSIS — R42 LIGHTHEADEDNESS: ICD-10-CM

## 2022-09-26 DIAGNOSIS — I48.0 PAROXYSMAL ATRIAL FIBRILLATION (HCC): Primary | ICD-10-CM

## 2022-09-26 DIAGNOSIS — R05.9 COUGH: ICD-10-CM

## 2022-09-26 DIAGNOSIS — R06.02 SHORTNESS OF BREATH: ICD-10-CM

## 2022-09-26 DIAGNOSIS — K21.9 GASTROESOPHAGEAL REFLUX DISEASE WITHOUT ESOPHAGITIS: ICD-10-CM

## 2022-09-26 DIAGNOSIS — R53.1 WEAKNESS: ICD-10-CM

## 2022-09-26 PROCEDURE — 93000 ELECTROCARDIOGRAM COMPLETE: CPT | Performed by: NURSE PRACTITIONER

## 2022-09-26 PROCEDURE — 99215 OFFICE O/P EST HI 40 MIN: CPT | Performed by: NURSE PRACTITIONER

## 2022-09-26 NOTE — PATIENT INSTRUCTIONS
Please call the [facility] sleep medicine office at [phone] to schedule an appointment for consultation. This appointment will begin the process of evaluation to determine whether or not you have sleep apnea which may be affecting your heart.

## 2022-09-26 NOTE — PROGRESS NOTES
HISTORY OF PRESENT ILLNESS  Breana Epperson is a 63 y.o. female. She has a remote history of atrial fibrillation and palpitations. Event monitors have only shown ectopic beats and no atrial fibrillation recently. She has intolerance to multiple medications. She stopped taking Claritin and Pepcid and Dr. King Luna said her symptoms were much better.   He reported that taking propafenone twice daily controlled her palpitations the best.    Today she reports that for the last month she has been having digestive issues and palpitations which she feels are related  She had an upper GI with [facility] last week which found acid reflux, small hiatal hernia, Rosa's ring  She has a lot of questions about these findings and I advised her to discuss them with GI at her appt on 10/10/22  She says she cannot tolerate pepcid or prilosec and recently stopped gaviscon because she felt like it was making her have dyspnea with minimal exertion and weakness   She says she has dyspnea with stairs and is now having trouble walking her neighbor's dog due to dyspnea and fatigue/weakness  Says she cannot tolerate antacids and stopped gaviscon 3 weeks ago   Having some lightheadedness intermittently as well  No chest pain   She has been waking up at night with shortness of breath, heart pounding, lightheadedness   Doesn't know if she snores, coughs when she lies down, sleeps with her head elevated, feels like she has \"crackling\" in her lungs when she is lying on her back  No headaches, no daytime fatigue  She doesn't sleep well - took hydroxyzine 5mg and was groggy the next day  I advised her to contact Dr. Ciara Benton to discuss other options  She denies any CAD in her family - coronary calcium scan zero in 2020  Has a lot of anxiety over her symptoms   Says she feels like she can't do activity because her heart is not pumping enough blood, or at least it feels that way  Asking if she has been taking propafenone too long and I told her I didn't think the propafenone was causing her symptoms       HPI  Patient Active Problem List   Diagnosis Code    Vitamin D deficiency E55.9    Tear film insufficiency H04.129    Defect, retinal H33.309    Non-allergic rhinitis J31.0    Multiple allergies Z88.9    Osteopenia M85.80    History of kidney stones Z87.442    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 30 Tablet 2    propafenone (RYTHMOL) 150 mg tablet Take 1 Tablet by mouth two (2) times a day. 180 Tablet 3    loratadine (CLARITIN) 10 mg tablet Take 10 mg by mouth daily as needed. Past Medical History:   Diagnosis Date    Advance directive discussed with patient 09/13/2016    has info    Endometriosis     GERD (gastroesophageal reflux disease) 07/17/2017    GI eval note Meño Gasca post visit to ED for atypical chest pains    Menopause     LMP-Dec. 2004    Murmur     Reflux esophagitis 07/27/2017 initial eval note    7/27/17 Rahat Gabriel note and 8/1/17 EGD report    Tick bite 06/19/2017    Removed with no problems.     Tongue lesion 4/27/16    area removed per note from [facility]    Vertigo     Vitamin D deficiency     Vocal cord dysfunction     due to significant allergies     Past Surgical History:   Procedure Laterality Date    HX ABDOMINAL LAPAROSCOPY      x 2    HX COLONOSCOPY  2007    HX COLONOSCOPY  7/26/16    10 yr repeat [facility]COPY  08/01/2017    Yung Arellano  path report rec'd    HX HYSTERECTOMY  12/2004    HX OTHER SURGICAL  4/27/16 path pend    lesion on left lateral border of tongue    HX OTHER SURGICAL      basal cell face-2005(in [place] [facility])    HX RETINAL DETACHMENT REPAIR      HX RIGHT SALPINGO-OOPHORECTOMY  1980's    secondary to mass    [place] ABDOMEN SURGERY PROC UNLISTED  1989    cyst removed with right ovary       Review of Systems   Cardiovascular:  Positive for palpitations. All other systems reviewed and are negative. Visit Vitals  /78 (BP 1 Location: Left upper arm, BP Patient Position: Sitting, BP Cuff Size: Adult)   Pulse 75   Resp 16   Ht 5' 10\" (1.778 m)   Wt 176 lb 9.6 oz (80.1 kg)   LMP  (LMP Unknown)   SpO2 98%   BMI 25.34 kg/m²       Physical Exam  Vitals and nursing note reviewed. Constitutional:       Appearance: Normal appearance. HENT:      Head: Normocephalic. Right Ear: External ear normal.      Left Ear: External ear normal.      Nose: Nose normal.      Mouth/Throat:      Mouth: Mucous membranes are moist.   Eyes:      Extraocular Movements: Extraocular movements intact. Cardiovascular:      Rate and Rhythm: Normal rate and regular rhythm. Heart sounds: Normal heart sounds. Pulmonary:      Breath sounds: Normal breath sounds. Abdominal:      Palpations: Abdomen is soft. Musculoskeletal:         General: Normal range of motion. Cervical back: Normal range of motion. Skin:     General: Skin is warm. Neurological:      Mental Status: She is alert and oriented to person, place, and time. Psychiatric:         Behavior: Behavior normal.     12 lead ECG: NSR, QTc 402 ms    ASSESSMENT and PLAN  Continue propafenone for her palpitations and hx of AFib. I advised her to have a sleep study to look for NAGA as a possible cause of some of her symptoms but she wants to think about it before committing to do it. I also recommended she have an echo to check her EF and look for structural issues. She will follow up with GI as planned. I advised her to discuss her insomnia with Dr. Codey Rodriguez. She will follow up with Dr. Terrence Gallardo in 5-6 weeks after testing is complete.     Total Time Spent: 45 minutes       Future Appointments   Date Time Provider Albin Amaral   11/10/2022 10:00 AM MD HARSH Truong AMB   5/25/2023 10:00 AM Tova Carlisle MD ELIJAH [address], ACNP, [address] [facility]  [facility] [address] , [address] [address],[address]  [address] [facility], [address] [place] [address]  () [phone] ([facility]) [phone]

## 2022-10-05 ENCOUNTER — PATIENT MESSAGE (OUTPATIENT)
Dept: SLEEP MEDICINE | Age: 62
End: 2022-10-05

## 2022-10-18 ENCOUNTER — HOSPITAL ENCOUNTER (OUTPATIENT)
Dept: NON INVASIVE DIAGNOSTICS | Age: 62
Discharge: HOME | End: 2022-10-18
Attending: NURSE PRACTITIONER
Payer: COMMERCIAL

## 2022-10-18 VITALS
WEIGHT: 176.59 LBS | DIASTOLIC BLOOD PRESSURE: 67 MMHG | BODY MASS INDEX: 25.28 KG/M2 | SYSTOLIC BLOOD PRESSURE: 116 MMHG | HEIGHT: 70 IN

## 2022-10-18 DIAGNOSIS — R00.2 PALPITATIONS: ICD-10-CM

## 2022-10-18 PROCEDURE — 93306 TTE W/DOPPLER COMPLETE: CPT

## 2022-10-19 LAB
ECHO AO ROOT DIAM: 3.3 CM
ECHO AO ROOT INDEX: 1.67 CM/M2
ECHO AR MAX VEL PISA: 3.1 M/S
ECHO AV AREA PEAK VELOCITY: 2 CM2
ECHO AV AREA/BSA PEAK VELOCITY: 1 CM2/M2
ECHO AV PEAK GRADIENT: 10 MMHG
ECHO AV PEAK VELOCITY: 1.6 M/S
ECHO AV REGURGITANT PHT: 853.8 MILLISECOND
ECHO AV VELOCITY RATIO: 0.81
ECHO EST RA PRESSURE: 15 MMHG
ECHO LA DIAMETER INDEX: 1.62 CM/M2
ECHO LA DIAMETER: 3.2 CM
ECHO LA TO AORTIC ROOT RATIO: 0.97
ECHO LV E' LATERAL VELOCITY: 10 CM/S
ECHO LV E' SEPTAL VELOCITY: 11 CM/S
ECHO LV FRACTIONAL SHORTENING: 32 % (ref 28–44)
ECHO LV INTERNAL DIMENSION DIASTOLE INDEX: 2.22 CM/M2
ECHO LV INTERNAL DIMENSION DIASTOLIC: 4.4 CM (ref 3.9–5.3)
ECHO LV INTERNAL DIMENSION SYSTOLIC INDEX: 1.52 CM/M2
ECHO LV INTERNAL DIMENSION SYSTOLIC: 3 CM
ECHO LV IVSD: 1 CM (ref 0.6–0.9)
ECHO LV MASS 2D: 109.4 G (ref 67–162)
ECHO LV MASS INDEX 2D: 55.3 G/M2 (ref 43–95)
ECHO LV POSTERIOR WALL DIASTOLIC: 0.6 CM (ref 0.6–0.9)
ECHO LV RELATIVE WALL THICKNESS RATIO: 0.27
ECHO LVOT AREA: 2.3 CM2
ECHO LVOT DIAM: 1.7 CM
ECHO LVOT PEAK GRADIENT: 7 MMHG
ECHO LVOT PEAK VELOCITY: 1.3 M/S
ECHO MV A VELOCITY: 0.72 M/S
ECHO MV A VELOCITY: 0.76 M/S
ECHO MV AREA PHT: 3.3 CM2
ECHO MV E DECELERATION TIME (DT): 229.2 MS
ECHO MV E VELOCITY: 0.53 M/S
ECHO MV E VELOCITY: 0.56 M/S
ECHO MV PRESSURE HALF TIME (PHT): 66.5 MS
ECHO MV REGURGITANT PEAK GRADIENT: 27 MMHG
ECHO MV REGURGITANT PEAK VELOCITY: 2.6 M/S
ECHO PV MAX VELOCITY: 1.2 M/S
ECHO PV PEAK GRADIENT: 5 MMHG
ECHO RIGHT VENTRICULAR SYSTOLIC PRESSURE (RVSP): 41 MMHG
ECHO RV FREE WALL PEAK S': 21 CM/S
ECHO RV TAPSE: 3.5 CM (ref 1.7–?)
ECHO TV REGURGITANT MAX VELOCITY: 2.53 M/S
ECHO TV REGURGITANT PEAK GRADIENT: 26 MMHG

## 2022-10-19 PROCEDURE — 93306 TTE W/DOPPLER COMPLETE: CPT | Performed by: SPECIALIST

## 2022-10-20 ENCOUNTER — TELEPHONE (OUTPATIENT)
Dept: SLEEP MEDICINE | Age: 62
End: 2022-10-20

## 2022-10-20 NOTE — TELEPHONE ENCOUNTER
Phoned the patient to schedule a sleep consult per Neymar Rosa NP. Patient refused to schedule at this time.

## 2022-11-10 ENCOUNTER — OFFICE VISIT (OUTPATIENT)
Dept: CARDIOLOGY CLINIC | Age: 62
End: 2022-11-10
Payer: COMMERCIAL

## 2022-11-10 VITALS
WEIGHT: 173 LBS | BODY MASS INDEX: 24.77 KG/M2 | DIASTOLIC BLOOD PRESSURE: 79 MMHG | HEIGHT: 70 IN | HEART RATE: 73 BPM | OXYGEN SATURATION: 99 % | SYSTOLIC BLOOD PRESSURE: 111 MMHG | RESPIRATION RATE: 16 BRPM

## 2022-11-10 DIAGNOSIS — I07.1 TRICUSPID VALVE INSUFFICIENCY, UNSPECIFIED ETIOLOGY: ICD-10-CM

## 2022-11-10 DIAGNOSIS — I49.3 PVC (PREMATURE VENTRICULAR CONTRACTION): ICD-10-CM

## 2022-11-10 DIAGNOSIS — R05.3 CHRONIC COUGH: ICD-10-CM

## 2022-11-10 DIAGNOSIS — I48.0 PAROXYSMAL ATRIAL FIBRILLATION (HCC): ICD-10-CM

## 2022-11-10 DIAGNOSIS — I49.1 PAC (PREMATURE ATRIAL CONTRACTION): ICD-10-CM

## 2022-11-10 DIAGNOSIS — R00.2 PALPITATIONS: Primary | ICD-10-CM

## 2022-11-10 PROCEDURE — 99214 OFFICE O/P EST MOD 30 MIN: CPT | Performed by: SPECIALIST

## 2022-11-10 NOTE — PROGRESS NOTES
HISTORY OF PRESENT ILLNESS  Breana Restrepo is a 63 y.o. female. She has a history of palpitations and a distant 3 of possible paroxysmal atrial fibrillation. She has been much improved on twice daily propafenone. She has a chronic cough and some reflux which may be causing the cough. She has been pursuing a low acid diet and things seem to be better. She also has allergies to dust mites and has been using Flonase. Her palpitations are infrequent. 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 180 Tablet 3    loratadine (CLARITIN) 10 mg tablet Take 10 mg by mouth daily as needed.  hydrOXYzine HCL (ATARAX) 10 mg tablet Take 1 Tablet by mouth nightly as needed for Sleep. (Patient not taking: Reported on 11/10/2022) 90 Tablet 1     Past Medical History:   Diagnosis Date    Advance directive discussed with patient 09/13/2016    has info    Endometriosis     GERD (gastroesophageal reflux disease) 07/17/2017    GI eval note Whitley Patch post visit to ED for atypical chest pains    Menopause     LMP-Dec. 2004    Murmur     Reflux esophagitis 07/27/2017 initial eval note    7/27/17 Ashu Bailey note and 8/1/17 EGD report    Tick bite 06/19/2017    Removed with no problems.     Tongue lesion 4/27/16    area removed per note from [facility]    Vertigo     Vitamin D deficiency     Vocal cord dysfunction     due to significant allergies     Past Surgical History:   Procedure Laterality Date    HX ABDOMINAL LAPAROSCOPY      x 2    HX COLONOSCOPY  2007    HX COLONOSCOPY  7/26/16    10 yr repeat Pinky Villarreal    HX ENDOSCOPY  08/01/2017    Nedda Oiler  path report rec'd    HX HYSTERECTOMY  12/2004    HX OTHER SURGICAL  4/27/16 path pend    lesion on left lateral border of tongue    HX OTHER SURGICAL      [facility]2005([facility])    HX RETINAL DETACHMENT REPAIR      HX RIGHT SALPINGO-OOPHORECTOMY  1980's    secondary to mass    [place] ABDOMEN SURGERY PROC UNLISTED  1989    cyst removed with right ovary       Review of Systems   Cardiovascular:  Positive for palpitations. All other systems reviewed and are negative. Visit Vitals  /79 (BP 1 Location: Left upper arm, BP Patient Position: Sitting, BP Cuff Size: Adult)   Pulse 73   Resp 16   Ht 5' 10\" (1.778 m)   Wt 173 lb (78.5 kg)   LMP  (LMP Unknown)   SpO2 99%   BMI 24.82 kg/m²       Physical Exam  Vitals and nursing note reviewed. Constitutional:       Appearance: Normal appearance. HENT:      Head: Normocephalic. Right Ear: External ear normal.      Left Ear: External ear normal.      Nose: Nose normal.      Mouth/Throat:      Mouth: Mucous membranes are moist.   Eyes:      Extraocular Movements: Extraocular movements intact. Cardiovascular:      Rate and Rhythm: Normal rate and regular rhythm. Heart sounds: Normal heart sounds. Pulmonary:      Effort: Pulmonary effort is normal.   Abdominal:      Palpations: Abdomen is soft. Musculoskeletal:         General: Normal range of motion. Cervical back: Normal range of motion. Skin:     General: Skin is warm. Neurological:      Mental Status: She is alert and oriented to person, place, and time. Psychiatric:         Behavior: Behavior normal.       ASSESSMENT and PLAN  Overall she seems to be stable and doing quite well with only occasional palpitations. I will continue this regimen and plan to see her in 6 months.   We discussed possible referral to a pulmonary physician but she would like to wait in this regard.

## 2023-02-03 ENCOUNTER — TRANSCRIBE ORDER (OUTPATIENT)
Dept: SCHEDULING | Age: 63
End: 2023-02-03

## 2023-02-03 DIAGNOSIS — Z12.31 SCREENING MAMMOGRAM FOR HIGH-RISK PATIENT: Primary | ICD-10-CM

## 2023-02-06 ENCOUNTER — HOSPITAL ENCOUNTER (OUTPATIENT)
Dept: GENERAL RADIOLOGY | Age: 63
Discharge: HOME | End: 2023-02-06
Payer: COMMERCIAL

## 2023-02-06 ENCOUNTER — TRANSCRIBE ORDER (OUTPATIENT)
Dept: REGISTRATION | Age: 63
End: 2023-02-06

## 2023-02-06 DIAGNOSIS — R05.9 COUGH: Primary | ICD-10-CM

## 2023-02-06 DIAGNOSIS — R05.9 COUGH: ICD-10-CM

## 2023-02-06 PROCEDURE — 71046 X-RAY EXAM CHEST 2 VIEWS: CPT

## 2023-04-22 DIAGNOSIS — Z12.31 SCREENING MAMMOGRAM FOR HIGH-RISK PATIENT: Primary | ICD-10-CM

## 2023-05-11 ENCOUNTER — TELEPHONE (OUTPATIENT)
Age: 63
End: 2023-05-11

## 2023-05-11 NOTE — TELEPHONE ENCOUNTER
Called pt. Verified patient's identity with two identifiers. Notified her of results she asked about left and right atrium compared to last time and I told her this time both showed normal size. She would like more info. She will keep appt scheduled later this month to discuss with Dr. Philip Otoole further in detail. Patient verbalized understanding and denied further questions or concerns.

## 2023-05-20 RX ORDER — LORATADINE 10 MG/1
10 TABLET ORAL DAILY PRN
COMMUNITY

## 2023-05-20 RX ORDER — HYDROXYZINE HYDROCHLORIDE 10 MG/1
1 TABLET, FILM COATED ORAL NIGHTLY PRN
COMMUNITY
Start: 2022-10-04 | End: 2023-05-26

## 2023-05-20 RX ORDER — PROPAFENONE HYDROCHLORIDE 150 MG/1
150 TABLET, COATED ORAL 2 TIMES DAILY
COMMUNITY
Start: 2022-05-19 | End: 2023-05-26 | Stop reason: SDUPTHER

## 2023-05-26 ENCOUNTER — OFFICE VISIT (OUTPATIENT)
Age: 63
End: 2023-05-26
Payer: COMMERCIAL

## 2023-05-26 VITALS
DIASTOLIC BLOOD PRESSURE: 60 MMHG | RESPIRATION RATE: 16 BRPM | BODY MASS INDEX: 25.48 KG/M2 | WEIGHT: 178 LBS | HEIGHT: 70 IN | SYSTOLIC BLOOD PRESSURE: 110 MMHG | OXYGEN SATURATION: 96 % | HEART RATE: 83 BPM

## 2023-05-26 DIAGNOSIS — I49.1 ATRIAL PREMATURE DEPOLARIZATION: ICD-10-CM

## 2023-05-26 DIAGNOSIS — K21.9 GASTRO-ESOPHAGEAL REFLUX DISEASE WITHOUT ESOPHAGITIS: ICD-10-CM

## 2023-05-26 DIAGNOSIS — I48.0 PAROXYSMAL ATRIAL FIBRILLATION (HCC): Primary | ICD-10-CM

## 2023-05-26 PROCEDURE — 99214 OFFICE O/P EST MOD 30 MIN: CPT | Performed by: SPECIALIST

## 2023-05-26 RX ORDER — PROPAFENONE HYDROCHLORIDE 150 MG/1
150 TABLET, COATED ORAL 2 TIMES DAILY
Qty: 180 TABLET | Refills: 3 | Status: SHIPPED | OUTPATIENT
Start: 2023-05-26

## 2023-05-26 RX ORDER — LORATADINE 10 MG/1
10 TABLET ORAL
COMMUNITY
End: 2023-05-26

## 2023-05-26 RX ORDER — MONTELUKAST SODIUM 10 MG/1
TABLET ORAL
COMMUNITY
Start: 2023-05-01

## 2023-05-26 ASSESSMENT — ENCOUNTER SYMPTOMS
WHEEZING: 1
COUGH: 1

## 2023-05-26 NOTE — PROGRESS NOTES
HISTORY OF PRESENT ILLNESS  Deb Frye is a 66 y.o. female   She has a history of 1 episode of atrial fibrillation occurring a number of years ago. She has been on propafenone ever since with no recurrence. She recently had an upper respiratory infection with some palpitations although this is improved. She now wears an Apple watch. She had an echocardiogram recently that showed normal heart function. Her weight is up 5 pounds. She has problems with reflux. She would like to consider stopping the propafenone. Hypertension  Associated symptoms include palpitations. Patient Active Problem List   Diagnosis    Tear film insufficiency    Vitamin D deficiency    Epigastric pain    History of kidney stones    Defect, retinal    Benign neoplasm of iris    Gastroesophageal reflux disease    Multiple allergies    Osteopenia    Non-allergic rhinitis    Paroxysmal atrial fibrillation (HCC)     Current Outpatient Medications   Medication Sig Dispense Refill    montelukast (SINGULAIR) 10 MG tablet TAKE 1 TABLET BY MOUTH EVERYDAY AT BEDTIME      propafenone (RYTHMOL) 150 MG tablet Take 1 tablet by mouth 2 times daily 180 tablet 3    loratadine (CLARITIN) 10 MG tablet Take 1 tablet by mouth daily as needed       No current facility-administered medications for this visit. Allergies   Allergen Reactions    Penicillins Hives    Morphine Rash     Leg rash     Past Medical History:   Diagnosis Date    Advance directive discussed with patient 09/13/2016    has info    Endometriosis     GERD (gastroesophageal reflux disease) 07/17/2017    GI eval note Jessica Evans post visit to ED for atypical chest pains    Menopause     LMP-Dec. 2004    Murmur     Reflux esophagitis 07/27/2017 initial eval note    7/27/17 [facility] Dawn note and 8/1/17 EGD report    Tick bite 06/19/2017    Removed with no problems.     Tongue lesion 4/27/16    area removed per note from [facility]    Vertigo     Vitamin D deficiency     Vocal cord

## 2023-06-25 ENCOUNTER — TELEPHONE (OUTPATIENT)
Age: 63
End: 2023-06-25

## 2023-06-25 DIAGNOSIS — I48.0 PAROXYSMAL ATRIAL FIBRILLATION (HCC): ICD-10-CM

## 2023-06-28 ENCOUNTER — TELEPHONE (OUTPATIENT)
Age: 63
End: 2023-06-28

## 2023-06-28 DIAGNOSIS — I48.0 PAROXYSMAL ATRIAL FIBRILLATION (HCC): ICD-10-CM

## 2023-06-28 RX ORDER — PROPAFENONE HYDROCHLORIDE 150 MG/1
150 TABLET, COATED ORAL 2 TIMES DAILY
Qty: 60 TABLET | Refills: 4 | Status: SHIPPED | OUTPATIENT
Start: 2023-06-28

## 2023-06-28 RX ORDER — PROPAFENONE HYDROCHLORIDE 150 MG/1
TABLET, COATED ORAL
Qty: 60 TABLET | Refills: 4 | Status: SHIPPED | OUTPATIENT
Start: 2023-06-28 | End: 2023-06-28 | Stop reason: SDUPTHER

## 2023-07-04 ENCOUNTER — PATIENT MESSAGE (OUTPATIENT)
Age: 63
End: 2023-07-04

## 2023-07-04 DIAGNOSIS — I48.0 PAROXYSMAL ATRIAL FIBRILLATION (HCC): Primary | ICD-10-CM

## 2023-07-04 DIAGNOSIS — I49.1 ATRIAL PREMATURE DEPOLARIZATION: ICD-10-CM

## 2023-07-05 NOTE — TELEPHONE ENCOUNTER
The [employer] mobile strip shows NSR. Echo in 05/2023 showed normal LVEF. Please check 7 day holter monitor if she's willing to try it. It would be helpful to get a sense of how HR varies throughout the day with normal activities, & 7 days should allow us to hopefully see some good days & bad days.

## 2023-07-05 NOTE — TELEPHONE ENCOUNTER
Also, patient has hiatal hernia. Recommend that she follows up with GI again, as that may contribute.

## 2023-07-06 ENCOUNTER — TELEPHONE (OUTPATIENT)
Age: 63
End: 2023-07-06

## 2023-07-06 NOTE — TELEPHONE ENCOUNTER
Enrolled with [employer] - Ordered and being shipped to patient's home address on file. ETA within STAT.         7 day holter  Received: Today  SHABANA Chahal  Please order 7 day holter per Ashanti Ardon NP dx: A fib.  Please expedite

## 2023-07-19 ENCOUNTER — TELEPHONE (OUTPATIENT)
Age: 63
End: 2023-07-19

## 2023-07-19 NOTE — TELEPHONE ENCOUNTER
Received preliminary holter report. HR  bpm, avg 71. PACs 0.03% with single atrial triplet up to 106 bpm.  PVC burden <0.01% with 3 morphologies. Patient's reported events correlated with sinus rhythm. Good heart rate variability. Dr. Tigre Willams will do final review of monitor findings. No change in treatment plan at this time.     Future Appointments   Date Time Provider [address]   7/21/2023 12:30 PM Whitney Carranza DO [facility] BS AMB   11/30/2023 10:00 AM MD ANGELES Humphries BS AMB

## 2023-07-24 ENCOUNTER — CLINICAL DOCUMENTATION (OUTPATIENT)
Age: 63
End: 2023-07-24

## 2023-07-24 NOTE — PROGRESS NOTES
3.5 days of holter showed Rare PAC and PVCs, 3 beats of consecutive atrial triplet  No change in treatment is recommended    Future Appointments   Date Time Provider [address]   8/1/2023  1:45 PM DO CHASE Guzman BS AMB   11/30/2023 10:00 AM MD ANGELES Brunson AMB

## 2023-07-26 ENCOUNTER — TELEPHONE (OUTPATIENT)
Age: 63
End: 2023-07-26

## 2023-07-26 NOTE — TELEPHONE ENCOUNTER
Verified patient with two types of identifiers. Informed patient of monitor results and recommendations.   Patient verbalized understanding and will call with any other questions.        ----- Message from Philippe Nieto MD sent at 7/24/2023  3:57 PM EDT -----  3.5 days of holter showed Rare PAC and PVCs, 3 beats of consecutive atrial triplet  No change in treatment is recommended

## 2023-09-28 DIAGNOSIS — I48.0 PAROXYSMAL ATRIAL FIBRILLATION (HCC): ICD-10-CM

## 2023-09-28 RX ORDER — PROPAFENONE HYDROCHLORIDE 150 MG/1
150 TABLET, COATED ORAL 2 TIMES DAILY
Qty: 180 TABLET | Refills: 0 | Status: SHIPPED | OUTPATIENT
Start: 2023-09-28

## 2023-09-28 NOTE — TELEPHONE ENCOUNTER
Requested Prescriptions     Signed Prescriptions Disp Refills    propafenone (RYTHMOL) 150 MG tablet 180 tablet 0     Sig: TAKE 1 TABLET BY MOUTH TWICE A DAY     Authorizing Provider: Rosa Linares     Ordering User: Joe Corley     Per Dr. Mckenna Quinones verbal order.

## 2023-11-28 ENCOUNTER — TELEMEDICINE (OUTPATIENT)
Dept: PRIMARY CARE CLINIC | Facility: CLINIC | Age: 63
End: 2023-11-28
Payer: COMMERCIAL

## 2023-11-28 ENCOUNTER — PATIENT MESSAGE (OUTPATIENT)
Dept: PRIMARY CARE CLINIC | Facility: CLINIC | Age: 63
End: 2023-11-28

## 2023-11-28 DIAGNOSIS — J06.9 UPPER RESPIRATORY INFECTION, ACUTE: Primary | ICD-10-CM

## 2023-11-28 PROCEDURE — 99213 OFFICE O/P EST LOW 20 MIN: CPT

## 2023-11-28 RX ORDER — AZITHROMYCIN 250 MG/1
250 TABLET, FILM COATED ORAL SEE ADMIN INSTRUCTIONS
Qty: 6 TABLET | Refills: 0 | Status: SHIPPED | OUTPATIENT
Start: 2023-11-28 | End: 2023-12-03

## 2023-11-28 SDOH — ECONOMIC STABILITY: INCOME INSECURITY: HOW HARD IS IT FOR YOU TO PAY FOR THE VERY BASICS LIKE FOOD, HOUSING, MEDICAL CARE, AND HEATING?: NOT VERY HARD

## 2023-11-28 SDOH — ECONOMIC STABILITY: FOOD INSECURITY: WITHIN THE PAST 12 MONTHS, YOU WORRIED THAT YOUR FOOD WOULD RUN OUT BEFORE YOU GOT MONEY TO BUY MORE.: NEVER TRUE

## 2023-11-28 SDOH — ECONOMIC STABILITY: FOOD INSECURITY: WITHIN THE PAST 12 MONTHS, THE FOOD YOU BOUGHT JUST DIDN'T LAST AND YOU DIDN'T HAVE MONEY TO GET MORE.: NEVER TRUE

## 2023-11-28 SDOH — ECONOMIC STABILITY: HOUSING INSECURITY
IN THE LAST 12 MONTHS, WAS THERE A TIME WHEN YOU DID NOT HAVE A STEADY PLACE TO SLEEP OR SLEPT IN A SHELTER (INCLUDING NOW)?: NO

## 2023-11-28 ASSESSMENT — ENCOUNTER SYMPTOMS
SINUS COMPLAINT: 1
VOMITING: 0
SHORTNESS OF BREATH: 0
VOICE CHANGE: 0
SORE THROAT: 1
RHINORRHEA: 1
CHEST TIGHTNESS: 0
COUGH: 1
SINUS PRESSURE: 1
FACIAL SWELLING: 0
CONSTIPATION: 0
WHEEZING: 0
NAUSEA: 0
TROUBLE SWALLOWING: 0
DIARRHEA: 0
SINUS PAIN: 1

## 2023-11-28 ASSESSMENT — PATIENT HEALTH QUESTIONNAIRE - PHQ9
2. FEELING DOWN, DEPRESSED OR HOPELESS: 0
1. LITTLE INTEREST OR PLEASURE IN DOING THINGS: 0
SUM OF ALL RESPONSES TO PHQ9 QUESTIONS 1 & 2: 0
SUM OF ALL RESPONSES TO PHQ QUESTIONS 1-9: 0

## 2023-11-28 NOTE — PROGRESS NOTES
Emile Wylie, was evaluated through a synchronous (real-time) audio-video encounter. The patient (or guardian if applicable) is aware that this is a billable service, which includes applicable co-pays. This Virtual Visit was conducted with patient's (and/or legal guardian's) consent. Patient identification was verified, and a caregiver was present when appropriate. The patient was located at Home: [address]  Provider was located at [facility] (Appt Dept): [address] [facility] FILOMENA Claudy [facility] [address],  [facility] Elizabeth Wylie (:  1960) is a Established patient, presenting virtually for evaluation of the following:    Assessment & Plan   Below is the assessment and plan developed based on review of pertinent history, physical exam, labs, studies, and medications. 1. Upper respiratory infection, acute  -     azithromycin (ZITHROMAX) 250 MG tablet; Take 1 tablet by mouth See Admin Instructions for 5 days 500mg on day 1 followed by 250mg on days 2 - 5, Disp-6 tablet, R-0Normal    Discussed symptomatic treatment; recommend Mucinex BID for congestion, Dayquil/Nyquil, Flonase for symptoms. Can try OTC saline rinse. Continue warm teas and salt water gargles for sore throat. Recommend taking at home COVID-19 test and asked that she contact clinic with results. Advised patient to monitor symptoms for next 3-4 days to see if symptoms will improve with symptomatic treatment as URI may be viral. She is agreeable to this. Allergies and Pmhx  reviewed. I prescribed Azithromycin if symptoms do not gradually resolve in next few days of if new symptoms present. Potential side effects were discussed. Follow up in clinic if no improvement with treatment prescribed today. All questions answered. Encounter note routed to PCP. Subjective   Patient states she has a hx of dust mite allergies and chronic nasal congestion.  Went to visit family over  and on  had she

## 2023-12-06 NOTE — TELEPHONE ENCOUNTER
Patient called back, is getting worse by the hour, body aches and Fatigue, Chills. Finished the Z pack but still not getting better.

## 2023-12-06 NOTE — TELEPHONE ENCOUNTER
Patient called in and stated she saw you last week for a Virtual and was getting better and now seems to be coming back. Please also see my chart note and picture.

## 2023-12-27 DIAGNOSIS — I48.0 PAROXYSMAL ATRIAL FIBRILLATION (HCC): ICD-10-CM

## 2023-12-27 RX ORDER — PROPAFENONE HYDROCHLORIDE 150 MG/1
150 TABLET, COATED ORAL 2 TIMES DAILY
Qty: 180 TABLET | Refills: 0 | Status: SHIPPED | OUTPATIENT
Start: 2023-12-27

## 2023-12-27 NOTE — TELEPHONE ENCOUNTER
Requested Prescriptions     Signed Prescriptions Disp Refills    propafenone (RYTHMOL) 150 MG tablet 180 tablet 0     Sig: TAKE 1 TABLET BY MOUTH TWICE A DAY     Authorizing Provider: Red Lin     Ordering User: Fiordaliza monet MD    Future Appointments   Date Time Provider [address]   1/18/2024  8:40 AM MD ANGELES Diehl AMB

## 2024-01-06 NOTE — TELEPHONE ENCOUNTER
Called patient. Verified patient's identity with two identifiers. Notified her Dr. Aristeo Oneal advised she see Dr. Burt Padilla. He also recommended an event monitor. Patient made appointment with Dr. Burt Padilla. She wants to call the phone # on the [employer] sheet to see what her cost will be. If she decides to wear the event monitor, she will call back and we can order it at that time. Patient verbalized understanding and denied further questions or concerns.
Patient called. Verified patient's identity with two identifiers. She said she had another episode of either a-fib or PVCs, denied any current sxs, and asked what she should do. Reviewed Dr. Aure Cuevas last office note and he mentioned referring her to Dr. Piyush Mercedes if afib re-occurred. Will discuss this with Dr. Jennie Sue to see if he wants her to see Dr. Piyush Mercedes in the office or if he has any other advice and will call patient back tomorrow morning. Patient verbalized understanding and denied further questions or concerns. Dr. Jennie Sue-   She also sent a [employer] message, which I attached to this call and sent to you.
Patient stated she has also been having stomach pain on top of all of this.    Phone: [phone]
Regarding: Non-Urgent Medical Question  Contact: [phone]  ----- Message from [address] [place] [address] [address], Generic sent at 2/26/2019  4:08 PM EST -----    Hi Dr Nixon Pandey, recently I had an episode of a-fib that lasted about an hour and a half and then went away. You said if it starts happening again to let you know. This afternoon on my way to my car after work it started again. It seems this time to be coming and going. It had stopped by the time I got home, and then about an hour later is started up again. Is this frequent enough that I should make an appointment with Dr Charmayne Angst? I have not eaten anything that I think would have triggered this episode. One other thing I will mention is that yesterday morning and this morning I could hear my blood pulsing in my right ear. I don't know if that is related to this or my allergies, but thought I'd mention it. Please advise. Thank you!
[Negative] : Heme/Lymph

## 2024-01-18 ENCOUNTER — OFFICE VISIT (OUTPATIENT)
Age: 64
End: 2024-01-18
Payer: COMMERCIAL

## 2024-01-18 VITALS
HEART RATE: 76 BPM | DIASTOLIC BLOOD PRESSURE: 60 MMHG | SYSTOLIC BLOOD PRESSURE: 110 MMHG | BODY MASS INDEX: 25.4 KG/M2 | OXYGEN SATURATION: 100 % | WEIGHT: 177 LBS

## 2024-01-18 DIAGNOSIS — K21.9 GASTRO-ESOPHAGEAL REFLUX DISEASE WITHOUT ESOPHAGITIS: ICD-10-CM

## 2024-01-18 DIAGNOSIS — R05.3 CHRONIC COUGH: ICD-10-CM

## 2024-01-18 DIAGNOSIS — I49.3 VENTRICULAR PREMATURE DEPOLARIZATION: ICD-10-CM

## 2024-01-18 DIAGNOSIS — R00.2 PALPITATIONS: ICD-10-CM

## 2024-01-18 DIAGNOSIS — I49.1 ATRIAL PREMATURE DEPOLARIZATION: Primary | ICD-10-CM

## 2024-01-18 DIAGNOSIS — I48.0 PAROXYSMAL ATRIAL FIBRILLATION (HCC): ICD-10-CM

## 2024-01-18 PROCEDURE — 99214 OFFICE O/P EST MOD 30 MIN: CPT | Performed by: SPECIALIST

## 2024-01-18 RX ORDER — FLUTICASONE PROPIONATE 50 MCG
1 SPRAY, SUSPENSION (ML) NASAL DAILY
COMMUNITY

## 2024-01-18 RX ORDER — PROPAFENONE HYDROCHLORIDE 150 MG/1
150 TABLET, COATED ORAL 2 TIMES DAILY
Qty: 180 TABLET | Refills: 3 | Status: SHIPPED | OUTPATIENT
Start: 2024-01-18

## 2024-01-18 RX ORDER — AZELASTINE HCL 205.5 UG/1
1 SPRAY NASAL ONCE
COMMUNITY

## 2024-01-18 RX ORDER — MONTELUKAST SODIUM 5 MG/1
5 TABLET, CHEWABLE ORAL ONCE
COMMUNITY
Start: 2023-10-27

## 2024-01-18 ASSESSMENT — PATIENT HEALTH QUESTIONNAIRE - PHQ9
SUM OF ALL RESPONSES TO PHQ QUESTIONS 1-9: 0
SUM OF ALL RESPONSES TO PHQ QUESTIONS 1-9: 0
1. LITTLE INTEREST OR PLEASURE IN DOING THINGS: 0
SUM OF ALL RESPONSES TO PHQ QUESTIONS 1-9: 0
2. FEELING DOWN, DEPRESSED OR HOPELESS: 0
SUM OF ALL RESPONSES TO PHQ QUESTIONS 1-9: 0
SUM OF ALL RESPONSES TO PHQ9 QUESTIONS 1 & 2: 0

## 2024-01-18 ASSESSMENT — ENCOUNTER SYMPTOMS: COUGH: 1

## 2024-01-18 NOTE — PROGRESS NOTES
HISTORY OF PRESENT ILLNESS  Deb Christian is a 63 y.o. female   She has a remote history of atrial fibrillation which has been controlled with propafenone.  She still has occasional palpitations but she has figured out that her ectopy seems to be worsened by significant gastroesophageal reflux which she has had for many years.  She has had coughing fits which in retrospect were due to reflux.  She now has put the head of her bed up on blocks which has helped a lot.  She walks 2 to 3 miles a day.  A recent monitor showed only occasional ectopic beats.  HPI     Specialty Problems          Cardiology Problems    Paroxysmal atrial fibrillation (HCC)          Current Outpatient Medications   Medication Instructions    azelastine HCl (ASTEPRO) 0.15 % SOLN 1 spray, Nasal, ONCE, As needed    fluticasone (FLONASE) 50 MCG/ACT nasal spray 1 spray, Each Nostril, DAILY    loratadine (CLARITIN) 10 mg, Oral, DAILY PRN    Melatonin 1 mg, Oral, NIGHTLY    montelukast (SINGULAIR) 5 mg, Oral, ONCE, As needed    propafenone (RYTHMOL) 150 mg, Oral, 2 TIMES DAILY      Allergies   Allergen Reactions    Penicillins Hives    Morphine Rash     Leg rash     Past Medical History:   Diagnosis Date    Advance directive discussed with patient 09/13/2016    has info    Endometriosis     GERD (gastroesophageal reflux disease) 07/17/2017    GI eval note Cordell Contreras post visit to ED for atypical chest pains    Menopause     LMP-Dec. 2004    Murmur     Reflux esophagitis 07/27/2017 initial eval note    7/27/17 Mk Lanier note and 8/1/17 EGD report    Tick bite 06/19/2017    Removed with no problems.    Tongue lesion 4/27/16    area removed per note from [facility]    Vertigo     Vitamin D deficiency     Vocal cord dysfunction     due to significant allergies     Past Surgical History:   Procedure Laterality Date    COLONOSCOPY  2007    COLONOSCOPY  7/26/16    10 yr repeat Yolande    HYSTERECTOMY (CERVIX STATUS UNKNOWN)  12/2004    LAPAROSCOPY      x 2

## 2024-03-23 DIAGNOSIS — I48.0 PAROXYSMAL ATRIAL FIBRILLATION (HCC): ICD-10-CM

## 2024-03-25 RX ORDER — PROPAFENONE HYDROCHLORIDE 150 MG/1
150 TABLET, COATED ORAL 2 TIMES DAILY
Qty: 180 TABLET | Refills: 1 | Status: SHIPPED | OUTPATIENT
Start: 2024-03-25

## 2024-03-25 NOTE — TELEPHONE ENCOUNTER
Requested Prescriptions     Signed Prescriptions Disp Refills    propafenone (RYTHMOL) 150 MG tablet 180 tablet 1     Sig: TAKE 1 TABLET BY MOUTH TWICE A DAY     Authorizing Provider: LOLA KRAFT     Ordering User: ENA MONTES    Per Dr. Kraft's verbal order.

## 2024-03-28 ENCOUNTER — TRANSCRIBE ORDERS (OUTPATIENT)
Facility: HOSPITAL | Age: 64
End: 2024-03-28

## 2024-03-28 DIAGNOSIS — Z12.31 VISIT FOR SCREENING MAMMOGRAM: Primary | ICD-10-CM

## 2024-04-12 ENCOUNTER — HOSPITAL ENCOUNTER (OUTPATIENT)
Facility: HOSPITAL | Age: 64
Discharge: HOME | End: 2024-04-12
Payer: COMMERCIAL

## 2024-04-12 VITALS — HEIGHT: 70 IN | BODY MASS INDEX: 24.34 KG/M2 | WEIGHT: 170 LBS

## 2024-04-12 DIAGNOSIS — Z12.31 VISIT FOR SCREENING MAMMOGRAM: ICD-10-CM

## 2024-04-12 PROCEDURE — 77067 SCR MAMMO BI INCL CAD: CPT

## 2024-09-19 DIAGNOSIS — I48.0 PAROXYSMAL ATRIAL FIBRILLATION (HCC): ICD-10-CM

## 2024-09-19 RX ORDER — PROPAFENONE HYDROCHLORIDE 150 MG/1
150 TABLET, COATED ORAL 2 TIMES DAILY
Qty: 180 TABLET | Refills: 0 | Status: SHIPPED | OUTPATIENT
Start: 2024-09-19

## 2024-10-17 DIAGNOSIS — I48.0 PAROXYSMAL ATRIAL FIBRILLATION (HCC): ICD-10-CM

## 2024-10-17 DIAGNOSIS — R00.2 PALPITATIONS: ICD-10-CM

## 2024-10-17 LAB
ALBUMIN SERPL-MCNC: 3.8 G/DL (ref 3.5–5)
ALBUMIN/GLOB SERPL: 1.3 (ref 1.1–2.2)
ALP SERPL-CCNC: 91 U/L (ref 45–117)
ALT SERPL-CCNC: 35 U/L (ref 12–78)
ANION GAP SERPL CALC-SCNC: 3 MMOL/L (ref 2–12)
AST SERPL-CCNC: 14 U/L (ref 15–37)
BILIRUB SERPL-MCNC: 0.4 MG/DL (ref 0.2–1)
BUN SERPL-MCNC: 13 MG/DL (ref 6–20)
BUN/CREAT SERPL: 16 (ref 12–20)
CALCIUM SERPL-MCNC: 9.4 MG/DL (ref 8.5–10.1)
CHLORIDE SERPL-SCNC: 109 MMOL/L (ref 97–108)
CHOLEST SERPL-MCNC: 199 MG/DL
CO2 SERPL-SCNC: 29 MMOL/L (ref 21–32)
CREAT SERPL-MCNC: 0.83 MG/DL (ref 0.55–1.02)
ERYTHROCYTE [DISTWIDTH] IN BLOOD BY AUTOMATED COUNT: 12.7 % (ref 11.5–14.5)
GLOBULIN SER CALC-MCNC: 3 G/DL (ref 2–4)
GLUCOSE SERPL-MCNC: 94 MG/DL (ref 65–100)
HCT VFR BLD AUTO: 39.6 % (ref 35–47)
HDLC SERPL-MCNC: 75 MG/DL
HDLC SERPL: 2.7 (ref 0–5)
HGB BLD-MCNC: 12.8 G/DL (ref 11.5–16)
LDLC SERPL CALC-MCNC: 96.6 MG/DL (ref 0–100)
MCH RBC QN AUTO: 30.1 PG (ref 26–34)
MCHC RBC AUTO-ENTMCNC: 32.3 G/DL (ref 30–36.5)
MCV RBC AUTO: 93.2 FL (ref 80–99)
NRBC # BLD: 0 K/UL (ref 0–0.01)
NRBC BLD-RTO: 0 PER 100 WBC
PLATELET # BLD AUTO: 189 K/UL (ref 150–400)
PMV BLD AUTO: 12.1 FL (ref 8.9–12.9)
POTASSIUM SERPL-SCNC: 4.4 MMOL/L (ref 3.5–5.1)
PROT SERPL-MCNC: 6.8 G/DL (ref 6.4–8.2)
RBC # BLD AUTO: 4.25 M/UL (ref 3.8–5.2)
SODIUM SERPL-SCNC: 141 MMOL/L (ref 136–145)
TRIGL SERPL-MCNC: 137 MG/DL
VLDLC SERPL CALC-MCNC: 27.4 MG/DL
WBC # BLD AUTO: 5.1 K/UL (ref 3.6–11)

## 2024-12-15 DIAGNOSIS — I48.0 PAROXYSMAL ATRIAL FIBRILLATION (HCC): ICD-10-CM

## 2024-12-16 RX ORDER — PROPAFENONE HYDROCHLORIDE 150 MG/1
150 TABLET, COATED ORAL 2 TIMES DAILY
Qty: 180 TABLET | Refills: 0 | Status: SHIPPED | OUTPATIENT
Start: 2024-12-16

## 2024-12-16 NOTE — TELEPHONE ENCOUNTER
Requested Prescriptions     Signed Prescriptions Disp Refills    propafenone (RYTHMOL) 150 MG tablet 180 tablet 0     Sig: TAKE 1 TABLET BY MOUTH TWICE A DAY     Authorizing Provider: LOLA KRAFT     Ordering User: ENA MONTES    Per Dr. Kraft's verbal order.      Made note next to upcoming appt to get an ekg

## 2025-01-23 ENCOUNTER — OFFICE VISIT (OUTPATIENT)
Age: 65
End: 2025-01-23
Payer: COMMERCIAL

## 2025-01-23 VITALS
BODY MASS INDEX: 26.34 KG/M2 | OXYGEN SATURATION: 99 % | HEIGHT: 70 IN | DIASTOLIC BLOOD PRESSURE: 80 MMHG | WEIGHT: 184 LBS | HEART RATE: 69 BPM | SYSTOLIC BLOOD PRESSURE: 120 MMHG

## 2025-01-23 DIAGNOSIS — I48.0 PAROXYSMAL ATRIAL FIBRILLATION (HCC): Primary | ICD-10-CM

## 2025-01-23 DIAGNOSIS — I49.3 VENTRICULAR PREMATURE DEPOLARIZATION: ICD-10-CM

## 2025-01-23 DIAGNOSIS — R00.2 PALPITATIONS: ICD-10-CM

## 2025-01-23 PROCEDURE — 99214 OFFICE O/P EST MOD 30 MIN: CPT | Performed by: SPECIALIST

## 2025-01-23 PROCEDURE — 93000 ELECTROCARDIOGRAM COMPLETE: CPT | Performed by: SPECIALIST

## 2025-01-23 RX ORDER — CLINDAMYCIN PHOSPHATE 10 MG/ML
SOLUTION TOPICAL
COMMUNITY
Start: 2025-01-17

## 2025-01-23 RX ORDER — PROPAFENONE HYDROCHLORIDE 150 MG/1
150 TABLET, COATED ORAL 2 TIMES DAILY
Qty: 180 TABLET | Refills: 0 | Status: SHIPPED | OUTPATIENT
Start: 2025-01-23

## 2025-01-23 ASSESSMENT — PATIENT HEALTH QUESTIONNAIRE - PHQ9
SUM OF ALL RESPONSES TO PHQ9 QUESTIONS 1 & 2: 0
SUM OF ALL RESPONSES TO PHQ QUESTIONS 1-9: 0
1. LITTLE INTEREST OR PLEASURE IN DOING THINGS: NOT AT ALL
SUM OF ALL RESPONSES TO PHQ QUESTIONS 1-9: 0
SUM OF ALL RESPONSES TO PHQ QUESTIONS 1-9: 0
2. FEELING DOWN, DEPRESSED OR HOPELESS: NOT AT ALL
SUM OF ALL RESPONSES TO PHQ QUESTIONS 1-9: 0

## 2025-01-23 NOTE — PROGRESS NOTES
HISTORY OF PRESENT ILLNESS  Deb Christian is a 64 y.o. female   She has a history of atrial fibrillation occurring quite a few years ago.  She has been on propafenone ever since ordered by Dr. Emmanuel.  She still has palpitations on occasion but it is unclear if she has ever had recurrence of atrial fibrillation.  She walks 2 miles a day.  She has an Apple Watch as well as a [employer] mobile device but she has trouble using them.  She suffers from some nasal congestion and allergies.  HPI     Specialty Problems          Cardiology Problems    Paroxysmal atrial fibrillation (HCC)          Current Outpatient Medications   Medication Instructions    azelastine HCl (ASTEPRO) 0.15 % SOLN 1 spray, ONCE    CLINDAMYCIN PHOSPHATE,TOPICAL, 1 % SWAB APPLY A SWAB TO AFFECTED AREA(S) THREE TIMES A DAY AS DIRECTED    fluticasone (FLONASE) 50 MCG/ACT nasal spray 1 spray, Each Nostril, DAILY    loratadine (CLARITIN) 10 mg, Oral, DAILY PRN    Melatonin 1 mg, NIGHTLY    montelukast (SINGULAIR) 5 mg, Oral, ONCE, As needed    propafenone (RYTHMOL) 150 mg, Oral, 2 TIMES DAILY      Allergies   Allergen Reactions    Penicillins Hives    Morphine Rash     Leg rash     Past Medical History:   Diagnosis Date    Advance directive discussed with patient 09/13/2016    has info    Endometriosis     GERD (gastroesophageal reflux disease) 07/17/2017    GI eval note Cordell Contreras post visit to ED for atypical chest pains    Menopause     LMP-Dec. 2004    Murmur     Reflux esophagitis 07/27/2017 initial eval note    7/27/17 Mk Lanier note and 8/1/17 EGD report    Tick bite 06/19/2017    Removed with no problems.    Tongue lesion 4/27/16    area removed per [facility] from [facility]    Vertigo     Vitamin D deficiency     Vocal cord dysfunction     due to significant allergies     Past Surgical History:   Procedure Laterality Date    COLONOSCOPY  2007    COLONOSCOPY  7/26/16    10 yr repeat Yolande    HYSTERECTOMY (CERVIX STATUS UNKNOWN)  12/2004    total

## 2025-03-25 ENCOUNTER — OFFICE VISIT (OUTPATIENT)
Age: 65
End: 2025-03-25
Payer: COMMERCIAL

## 2025-03-25 VITALS
HEART RATE: 74 BPM | HEIGHT: 70 IN | WEIGHT: 181 LBS | OXYGEN SATURATION: 99 % | SYSTOLIC BLOOD PRESSURE: 118 MMHG | DIASTOLIC BLOOD PRESSURE: 68 MMHG | BODY MASS INDEX: 25.91 KG/M2

## 2025-03-25 DIAGNOSIS — I48.0 PAROXYSMAL ATRIAL FIBRILLATION (HCC): Primary | ICD-10-CM

## 2025-03-25 PROCEDURE — 99214 OFFICE O/P EST MOD 30 MIN: CPT | Performed by: INTERNAL MEDICINE

## 2025-03-25 PROCEDURE — G2211 COMPLEX E/M VISIT ADD ON: HCPCS | Performed by: INTERNAL MEDICINE

## 2025-03-25 RX ORDER — PROPAFENONE HYDROCHLORIDE 150 MG/1
150 TABLET, COATED ORAL 2 TIMES DAILY
Qty: 180 TABLET | Refills: 1 | Status: SHIPPED | OUTPATIENT
Start: 2025-03-25

## 2025-03-25 ASSESSMENT — PATIENT HEALTH QUESTIONNAIRE - PHQ9
SUM OF ALL RESPONSES TO PHQ QUESTIONS 1-9: 0
2. FEELING DOWN, DEPRESSED OR HOPELESS: NOT AT ALL
SUM OF ALL RESPONSES TO PHQ QUESTIONS 1-9: 0
1. LITTLE INTEREST OR PLEASURE IN DOING THINGS: NOT AT ALL

## 2025-03-25 NOTE — PROGRESS NOTES
[place]: Bhakta  [phone]    HPI:  Ms. Christian is a 66 yo F seen in the past by Dr. Emmanuel/Dr. Kraft with a history of paroxysmal AFib initially on propafenone.  She monitors her AFib by [employer] device.  She in particular had episode of AFib/palpitations last Wednesday approximately 2 hours after getting dry needle treatment to her right shoulder.  She said she has not had that type of treatment before.  She developed an episode of severe coughing that sometimes could be exacerbated with her hiatal hernia and subsequently felt severe palpitation.  She confirmed she had AFib on her Kardia device with heart rate in the 150s-160s and took an extra propafenone.  After 1 hour she is still feeling palpitations and she took another pill.  Afterwards she felt shaky and weak.  She has not had any recurrent palpitations since then.  She has been taking a half dose of propafenone in the morning and then a regular pill in the evening and I did recommend she go back to her regular dose of one pill in the morning and one in the evening.  Otherwise, she denies any exertional chest pain or shortness of breath.  No changes in diet or activity.  Looking back she says she has probably had 2 episodes this past year. She is compensated on exam with clear lungs and no lower extremity edema.    Assessment and Plan:  1. Paroxysmal AFib.  She had a breakthrough episode and it is unclear whether or not this was directly related to treatment on her right shoulder though timing wise suspicious.  Will obtain an echo and blood work for further evaluation.  I do think she would benefit from seeing EP again as it has been a while to consider if ablation might be beneficial.  She is interested in having Dr. Lagos see her and will arrange that.   2. Hiatal hernia.      She  has a past medical history of Advance directive discussed with patient, Endometriosis, GERD (gastroesophageal reflux disease), Menopause, Murmur, Reflux

## 2025-03-25 NOTE — PROGRESS NOTES
1. Have you been to the ER, urgent care clinic since your last visit?  Hospitalized since your last visit?No    2. Have you seen or consulted any other health care providers outside of the [facility] since your last visit?  Include any pap smears or colon screening. No

## 2025-03-26 ENCOUNTER — RESULTS FOLLOW-UP (OUTPATIENT)
Age: 65
End: 2025-03-26

## 2025-03-26 DIAGNOSIS — I48.0 PAROXYSMAL ATRIAL FIBRILLATION (HCC): ICD-10-CM

## 2025-03-26 LAB
ALBUMIN SERPL-MCNC: 3.8 G/DL (ref 3.5–5)
ALBUMIN/GLOB SERPL: 1.3 (ref 1.1–2.2)
ALP SERPL-CCNC: 89 U/L (ref 45–117)
ALT SERPL-CCNC: 41 U/L (ref 12–78)
ANION GAP SERPL CALC-SCNC: 6 MMOL/L (ref 2–12)
AST SERPL-CCNC: 26 U/L (ref 15–37)
BILIRUB SERPL-MCNC: 0.4 MG/DL (ref 0.2–1)
BUN SERPL-MCNC: 15 MG/DL (ref 6–20)
BUN/CREAT SERPL: 16 (ref 12–20)
CALCIUM SERPL-MCNC: 9.7 MG/DL (ref 8.5–10.1)
CHLORIDE SERPL-SCNC: 107 MMOL/L (ref 97–108)
CO2 SERPL-SCNC: 28 MMOL/L (ref 21–32)
CREAT SERPL-MCNC: 0.93 MG/DL (ref 0.55–1.02)
ERYTHROCYTE [DISTWIDTH] IN BLOOD BY AUTOMATED COUNT: 12.3 % (ref 11.5–14.5)
GLOBULIN SER CALC-MCNC: 2.9 G/DL (ref 2–4)
GLUCOSE SERPL-MCNC: 98 MG/DL (ref 65–100)
HCT VFR BLD AUTO: 41 % (ref 35–47)
HGB BLD-MCNC: 12.9 G/DL (ref 11.5–16)
MCH RBC QN AUTO: 29.5 PG (ref 26–34)
MCHC RBC AUTO-ENTMCNC: 31.5 G/DL (ref 30–36.5)
MCV RBC AUTO: 93.8 FL (ref 80–99)
NRBC # BLD: 0 K/UL (ref 0–0.01)
NRBC BLD-RTO: 0 PER 100 WBC
PLATELET # BLD AUTO: 190 K/UL (ref 150–400)
PMV BLD AUTO: 11.3 FL (ref 8.9–12.9)
POTASSIUM SERPL-SCNC: 4.4 MMOL/L (ref 3.5–5.1)
PROT SERPL-MCNC: 6.7 G/DL (ref 6.4–8.2)
RBC # BLD AUTO: 4.37 M/UL (ref 3.8–5.2)
SODIUM SERPL-SCNC: 141 MMOL/L (ref 136–145)
TSH SERPL DL<=0.05 MIU/L-ACNC: 1.18 UIU/ML (ref 0.36–3.74)
WBC # BLD AUTO: 3.7 K/UL (ref 3.6–11)

## 2025-03-27 NOTE — TELEPHONE ENCOUNTER
----- Message from Dr. Shamar Bhakta MD sent at 3/26/2025  9:30 PM EDT -----  Please let pt know labs were overall normal. thx

## 2025-04-03 ENCOUNTER — PATIENT MESSAGE (OUTPATIENT)
Age: 65
End: 2025-04-03

## 2025-04-08 ENCOUNTER — PATIENT MESSAGE (OUTPATIENT)
Age: 65
End: 2025-04-08

## 2025-04-15 ENCOUNTER — RESULTS FOLLOW-UP (OUTPATIENT)
Dept: PRIMARY CARE CLINIC | Facility: CLINIC | Age: 65
End: 2025-04-15

## 2025-04-15 ENCOUNTER — HOSPITAL ENCOUNTER (OUTPATIENT)
Facility: HOSPITAL | Age: 65
Discharge: HOME | End: 2025-04-18
Payer: COMMERCIAL

## 2025-04-15 DIAGNOSIS — Z12.31 VISIT FOR SCREENING MAMMOGRAM: ICD-10-CM

## 2025-04-15 PROCEDURE — 77063 BREAST TOMOSYNTHESIS BI: CPT

## 2025-06-18 PROBLEM — Z79.899 HIGH RISK MEDICATION USE: Status: ACTIVE | Noted: 2025-06-18

## 2025-06-18 PROBLEM — I49.3 VENTRICULAR PREMATURE DEPOLARIZATION: Status: ACTIVE | Noted: 2025-06-18

## 2025-06-19 ENCOUNTER — OFFICE VISIT (OUTPATIENT)
Age: 65
End: 2025-06-19
Payer: COMMERCIAL

## 2025-06-19 VITALS
DIASTOLIC BLOOD PRESSURE: 86 MMHG | HEART RATE: 80 BPM | WEIGHT: 180.4 LBS | HEIGHT: 70 IN | SYSTOLIC BLOOD PRESSURE: 124 MMHG | OXYGEN SATURATION: 95 % | BODY MASS INDEX: 25.83 KG/M2

## 2025-06-19 DIAGNOSIS — R00.2 PALPITATIONS: ICD-10-CM

## 2025-06-19 DIAGNOSIS — Z79.899 HIGH RISK MEDICATION USE: ICD-10-CM

## 2025-06-19 DIAGNOSIS — I48.0 PAROXYSMAL ATRIAL FIBRILLATION (HCC): Primary | ICD-10-CM

## 2025-06-19 DIAGNOSIS — I49.3 VENTRICULAR PREMATURE DEPOLARIZATION: ICD-10-CM

## 2025-06-19 PROCEDURE — 93000 ELECTROCARDIOGRAM COMPLETE: CPT | Performed by: INTERNAL MEDICINE

## 2025-06-19 PROCEDURE — G2211 COMPLEX E/M VISIT ADD ON: HCPCS | Performed by: INTERNAL MEDICINE

## 2025-06-19 PROCEDURE — 99214 OFFICE O/P EST MOD 30 MIN: CPT | Performed by: INTERNAL MEDICINE

## 2025-06-19 ASSESSMENT — PATIENT HEALTH QUESTIONNAIRE - PHQ9
SUM OF ALL RESPONSES TO PHQ QUESTIONS 1-9: 0
1. LITTLE INTEREST OR PLEASURE IN DOING THINGS: NOT AT ALL
SUM OF ALL RESPONSES TO PHQ QUESTIONS 1-9: 0
SUM OF ALL RESPONSES TO PHQ QUESTIONS 1-9: 0
2. FEELING DOWN, DEPRESSED OR HOPELESS: NOT AT ALL
SUM OF ALL RESPONSES TO PHQ QUESTIONS 1-9: 0

## 2025-06-19 NOTE — PROGRESS NOTES
1. Have you been to the ER, urgent care clinic since your last visit?  Hospitalized since your last visit?No    2. Have you seen or consulted any other health care providers outside of the [facility] since your last visit?  Include any pap smears or colon screening. No

## 2025-06-19 NOTE — PATIENT INSTRUCTIONS
2- week [employer] Monitor in 6 months  FU with NP in 1 year  FU with Dr. Lagos in 18 months    You have been diagnosed with a heart rhythm condition called atrial fibrillation/atrial flutter.     Learning about atrial fibrillation/atrial flutter will help you understand your condition and treatment options. For more information regarding atrial fibrillation management, please visit:        [email or web address]    3. Below are additional QR codes you can use to learn more as you prepare for your EP appointment.

## 2025-06-19 NOTE — PROGRESS NOTES
Cardiac Electrophysiology Office Follow-up    NAME: Deb Christian   :  1960  MRM:  [id number]    Date:  2025         Assessment and Plan:     1. Paroxysmal atrial fibrillation (HCC)  -     EKG 12 Lead  2. Ventricular premature depolarization  -     EKG 12 Lead  3. Palpitations  -     EKG 12 Lead  4. High risk medication use         General Cardiologist: Yony      Paroxysmal atrial fibrillation:  Symptomatic atrial fibrillation. Trigger by hiatal hernia discomfort  - Initially diagnosed by EKG 2019  - On Propafenone 150 mg BID  - She reports feeling well, she does have occasional episodes of tachycardia. Most recently in May, she noted she was dehydrated, hadn't eaten, and was lifting heavy things.   - She attempts to avoid triggers to avoid episodes of AF.  - - The implication regarding the diagnosis of AF was explained to the patient in great detail including the associated risk of CVA, AF-mediated cardiomyopathy, and progression into persistent/chronic AF.  - Literature from the EAST-AFNET 4 Trial demonstrated that early rhythm control management in patients with early diagnosis of atrial fibrillation (median time of diagnosis, 36 days) resulted in reduction in cardiovascular mortality, stroke, or hospitalization with worsening heart failure or ACS.  - will continue conservative management at this time  - Obtain 2 week monitor in 6 months  - Follow-up with EP in 1 year    Anticoagulation:  - CHADSVASC 1, not currently on OAC                   Patient is an established patient with plan for longitudinal relationship and ongoing assessment and management of arrhythmias recurrence, monitor, labs as a focal point of continued medical care.   - FU in EP in 1 year with 6 months monitor    ATTENTION:   This medical record was transcribed using an electronic medical records/speech recognition system.  Although proofread, it may and can contain electronic, spelling and other errors.  Corrections may be

## 2025-09-03 ENCOUNTER — OFFICE VISIT (OUTPATIENT)
Age: 65
End: 2025-09-03
Payer: COMMERCIAL

## 2025-09-03 VITALS
WEIGHT: 181 LBS | HEART RATE: 77 BPM | OXYGEN SATURATION: 95 % | DIASTOLIC BLOOD PRESSURE: 60 MMHG | BODY MASS INDEX: 25.91 KG/M2 | HEIGHT: 70 IN | SYSTOLIC BLOOD PRESSURE: 102 MMHG

## 2025-09-03 DIAGNOSIS — I48.0 PAROXYSMAL ATRIAL FIBRILLATION (HCC): Primary | ICD-10-CM

## 2025-09-03 PROCEDURE — G2211 COMPLEX E/M VISIT ADD ON: HCPCS | Performed by: INTERNAL MEDICINE

## 2025-09-03 PROCEDURE — 99213 OFFICE O/P EST LOW 20 MIN: CPT | Performed by: INTERNAL MEDICINE

## 2025-09-03 RX ORDER — DOXYCYCLINE 100 MG/1
CAPSULE ORAL
COMMUNITY
Start: 2025-08-28

## 2025-09-03 RX ORDER — FAMOTIDINE, CALCIUM CARBONATE, AND MAGNESIUM HYDROXIDE 10; 800; 165 MG/1; MG/1; MG/1
TABLET, CHEWABLE ORAL PRN
COMMUNITY

## 2025-09-03 ASSESSMENT — PATIENT HEALTH QUESTIONNAIRE - PHQ9
1. LITTLE INTEREST OR PLEASURE IN DOING THINGS: NOT AT ALL
SUM OF ALL RESPONSES TO PHQ QUESTIONS 1-9: 0
2. FEELING DOWN, DEPRESSED OR HOPELESS: NOT AT ALL